# Patient Record
Sex: MALE | Race: WHITE | NOT HISPANIC OR LATINO | Employment: OTHER | URBAN - METROPOLITAN AREA
[De-identification: names, ages, dates, MRNs, and addresses within clinical notes are randomized per-mention and may not be internally consistent; named-entity substitution may affect disease eponyms.]

---

## 2020-08-04 ENCOUNTER — OFFICE VISIT (OUTPATIENT)
Dept: FAMILY MEDICINE CLINIC | Facility: CLINIC | Age: 55
End: 2020-08-04
Payer: COMMERCIAL

## 2020-08-04 VITALS
SYSTOLIC BLOOD PRESSURE: 130 MMHG | WEIGHT: 185 LBS | OXYGEN SATURATION: 97 % | BODY MASS INDEX: 25.9 KG/M2 | TEMPERATURE: 97.3 F | DIASTOLIC BLOOD PRESSURE: 82 MMHG | HEIGHT: 71 IN | HEART RATE: 86 BPM

## 2020-08-04 DIAGNOSIS — G56.01 CARPAL TUNNEL SYNDROME OF RIGHT WRIST: ICD-10-CM

## 2020-08-04 DIAGNOSIS — Z76.89 ENCOUNTER TO ESTABLISH CARE: Primary | ICD-10-CM

## 2020-08-04 DIAGNOSIS — Z12.11 COLON CANCER SCREENING: ICD-10-CM

## 2020-08-04 DIAGNOSIS — Z11.4 SCREENING FOR HIV (HUMAN IMMUNODEFICIENCY VIRUS): ICD-10-CM

## 2020-08-04 DIAGNOSIS — Z12.5 PROSTATE CANCER SCREENING: ICD-10-CM

## 2020-08-04 DIAGNOSIS — I10 ESSENTIAL HYPERTENSION: ICD-10-CM

## 2020-08-04 DIAGNOSIS — Z00.00 HEALTHCARE MAINTENANCE: ICD-10-CM

## 2020-08-04 PROCEDURE — 3725F SCREEN DEPRESSION PERFORMED: CPT | Performed by: NURSE PRACTITIONER

## 2020-08-04 PROCEDURE — 99203 OFFICE O/P NEW LOW 30 MIN: CPT | Performed by: NURSE PRACTITIONER

## 2020-08-04 PROCEDURE — 3075F SYST BP GE 130 - 139MM HG: CPT | Performed by: NURSE PRACTITIONER

## 2020-08-04 PROCEDURE — 3008F BODY MASS INDEX DOCD: CPT | Performed by: NURSE PRACTITIONER

## 2020-08-04 PROCEDURE — 3079F DIAST BP 80-89 MM HG: CPT | Performed by: NURSE PRACTITIONER

## 2020-08-04 RX ORDER — LISINOPRIL 20 MG/1
20 TABLET ORAL DAILY
Qty: 90 TABLET | Refills: 1 | Status: SHIPPED | OUTPATIENT
Start: 2020-08-04 | End: 2021-02-05 | Stop reason: SDUPTHER

## 2020-08-04 RX ORDER — DIPHENOXYLATE HYDROCHLORIDE AND ATROPINE SULFATE 2.5; .025 MG/1; MG/1
1 TABLET ORAL DAILY
COMMUNITY

## 2020-08-04 RX ORDER — ASPIRIN 81 MG/1
81 TABLET, CHEWABLE ORAL DAILY
COMMUNITY

## 2020-08-04 RX ORDER — LISINOPRIL 20 MG/1
20 TABLET ORAL DAILY
COMMUNITY
Start: 2020-06-10 | End: 2020-08-04 | Stop reason: SDUPTHER

## 2020-08-04 NOTE — PROGRESS NOTES
Assessment/Plan:     Chronic Problems:  Essential hypertension   Will obtain lab work  Will resume 20 mg lisinopril daily  Carpal tunnel syndrome of right wrist    Continue splints  Continue turmeric and Motrin as needed  Visit Diagnosis:  Diagnoses and all orders for this visit:    Encounter to establish care    Screening for HIV (human immunodeficiency virus)  Comments:    Patient amenable to HIV screening  Orders:  -     HIV 1/2 Antigen/Antibody (4th Generation) w Reflex [facility]; Future    Prostate cancer screening  -     PSA, Total Screen; Future    Colon cancer screening  -     Ambulatory referral to Gastroenterology; Future    Healthcare maintenance  -     CBC and differential; Future  -     Comprehensive metabolic panel; Future  -     Hemoglobin A1C; Future  -     Lipid panel; Future  -     TSH, 3rd generation; Future    Essential hypertension  -     Microalbumin / creatinine urine ratio  -     lisinopril (ZESTRIL) 20 mg tablet; Take 1 tablet (20 mg total) by mouth daily    Carpal tunnel syndrome of right wrist    Other orders  -     Discontinue: lisinopril (ZESTRIL) 20 mg tablet; Take 20 mg by mouth daily  -     MILK THISTLE PO; Take by mouth  -     multivitamin (THERAGRAN) TABS; Take 1 tablet by mouth daily  -     TURMERIC PO; Take by mouth  -     aspirin 81 mg chewable tablet; Chew 81 mg daily          Subjective:    Patient ID: Cleo Lo is a 49 y o  male  Patient presents to establish care  He was previously being seen at [facility] and then [facility]  He has not been seen for about a year  He has a son and a daughter-- 32 & 25  His daughter is in nursing school in takes his blood pressure frequently  Patient states that his blood pressure has been under good control recently  Past medical history of hypertension  Numbness in right hand every night, believes he has carpal tunnel  He does take turmeric, CBD and motrin with some relief  Pain wake him up every night  Currently unemployed, but worked in construction prior  Has been out of BP meds for over a week  Colonoscopy- never  Never had PSA  Due for blood work  The following portions of the patient's history were reviewed and updated as appropriate: allergies, current medications, past family history, past medical history, past social history, past surgical history and problem list     Review of Systems   Constitutional: Negative for chills, fatigue and fever  HENT: Negative  Respiratory: Negative for cough, chest tightness, shortness of breath and wheezing  Cardiovascular: Negative for chest pain and palpitations  Gastrointestinal: Negative for abdominal pain, constipation, diarrhea, nausea and vomiting  Genitourinary: Negative  Musculoskeletal: Positive for arthralgias (right arm) and joint swelling  Neurological: Negative for dizziness, light-headedness and headaches  Psychiatric/Behavioral: Negative for dysphoric mood and sleep disturbance  The patient is not nervous/anxious            /82   Pulse 86   Temp (!) 97 3 °F (36 3 °C)   Ht 5' 11"   Wt 83 9 kg (185 lb)   SpO2 97%   BMI 25 80 kg/m²   Social History     Socioeconomic History    Marital status: Single     Spouse name: Not on file    Number of children: Not on file    Years of education: Not on file    Highest education level: Not on file   Occupational History    Not on file   Social Needs    Financial resource strain: Not on file    Food insecurity     Worry: Not on file     Inability: Not on file    Transportation needs     Medical: Not on file     Non-medical: Not on file   Tobacco Use    Smoking status: Current Every Day Smoker     Packs/day: 0 50     Years: 5 00     Pack years: 2 50    Smokeless tobacco: Never Used   Substance and Sexual Activity    Alcohol use: Yes     Frequency: 4 or more times a week     Drinks per session: 1 or 2    Drug use: Not on file    Sexual activity: Not on file   Lifestyle    Physical activity     Days per week: Not on file     Minutes per session: Not on file    Stress: Not on file   Relationships    Social connections     Talks on phone: Not on file     Gets together: Not on file     Attends Christianity service: Not on file     Active member of club or organization: Not on file     Attends meetings of clubs or organizations: Not on file     Relationship status: Not on file    Intimate partner violence     Fear of current or ex partner: Not on file     Emotionally abused: Not on file     Physically abused: Not on file     Forced sexual activity: Not on file   Other Topics Concern    Not on file   Social History Narrative    Not on file     Past Medical History:   Diagnosis Date    Hypertension      Family History   Problem Relation Age of Onset    Lymphoma Mother     Skin cancer Father     Pancreatic cancer Maternal Grandmother      History reviewed  No pertinent surgical history  Current Outpatient Medications:     aspirin 81 mg chewable tablet, Chew 81 mg daily, Disp: , Rfl:     lisinopril (ZESTRIL) 20 mg tablet, Take 1 tablet (20 mg total) by mouth daily, Disp: 90 tablet, Rfl: 1    MILK THISTLE PO, Take by mouth, Disp: , Rfl:     multivitamin (THERAGRAN) TABS, Take 1 tablet by mouth daily, Disp: , Rfl:     TURMERIC PO, Take by mouth, Disp: , Rfl:     No Known Allergies       Lab Review   No results found for any previous visit  Imaging: No results found  Objective:     Physical Exam   Constitutional: He is oriented to person, place, and time  He appears well-developed  No distress  HENT:   Head: Normocephalic  Right Ear: External ear normal    Left Ear: External ear normal    Mouth/Throat: No oropharyngeal exudate  Eyes: Pupils are equal, round, and reactive to light  Conjunctivae are normal    Neck: Normal range of motion  Cardiovascular: Normal rate, regular rhythm and normal heart sounds  Exam reveals no gallop  No murmur heard    Pulmonary/Chest: Effort normal and breath sounds normal  No respiratory distress  He has no wheezes  Musculoskeletal: Normal range of motion  Right shoulder: He exhibits pain  He exhibits normal range of motion, no tenderness, no swelling and normal strength  Lymphadenopathy:     He has no cervical adenopathy  Neurological: He is alert and oriented to person, place, and time  Skin: Skin is warm and dry  There are no Patient Instructions on file for this visit  BEL Barrett    Portions of the record may have been created with voice recognition software  Occasional wrong word or "sound a like" substitutions may have occurred due to the inherent limitations of voice recognition software  Read the chart carefully and recognize, using context, where substitutions have occurred

## 2020-10-21 ENCOUNTER — LAB (OUTPATIENT)
Dept: LAB | Facility: HOSPITAL | Age: 55
End: 2020-10-21
Payer: COMMERCIAL

## 2020-10-21 DIAGNOSIS — Z11.4 SCREENING FOR HIV (HUMAN IMMUNODEFICIENCY VIRUS): ICD-10-CM

## 2020-10-21 DIAGNOSIS — Z00.00 HEALTHCARE MAINTENANCE: ICD-10-CM

## 2020-10-21 DIAGNOSIS — Z12.5 PROSTATE CANCER SCREENING: ICD-10-CM

## 2020-10-21 DIAGNOSIS — R74.8 ELEVATED LIVER ENZYMES: Primary | ICD-10-CM

## 2020-10-21 LAB
ALBUMIN SERPL BCP-MCNC: 4 G/DL (ref 3.5–5)
ALP SERPL-CCNC: 118 U/L (ref 46–116)
ALT SERPL W P-5'-P-CCNC: 118 U/L (ref 12–78)
ANION GAP SERPL CALCULATED.3IONS-SCNC: 11 MMOL/L (ref 4–13)
AST SERPL W P-5'-P-CCNC: 149 U/L (ref 5–45)
BASOPHILS # BLD AUTO: 0.05 THOUSANDS/ΜL (ref 0–0.1)
BASOPHILS NFR BLD AUTO: 1 % (ref 0–1)
BILIRUB SERPL-MCNC: 1 MG/DL (ref 0.2–1)
BUN SERPL-MCNC: 13 MG/DL (ref 5–25)
CALCIUM SERPL-MCNC: 9.5 MG/DL (ref 8.3–10.1)
CHLORIDE SERPL-SCNC: 99 MMOL/L (ref 100–108)
CHOLEST SERPL-MCNC: 178 MG/DL (ref 50–200)
CO2 SERPL-SCNC: 30 MMOL/L (ref 21–32)
CREAT SERPL-MCNC: 0.92 MG/DL (ref 0.6–1.3)
CREAT UR-MCNC: 296 MG/DL
EOSINOPHIL # BLD AUTO: 0.11 THOUSAND/ΜL (ref 0–0.61)
EOSINOPHIL NFR BLD AUTO: 2 % (ref 0–6)
ERYTHROCYTE [DISTWIDTH] IN BLOOD BY AUTOMATED COUNT: 14.9 % (ref 11.6–15.1)
EST. AVERAGE GLUCOSE BLD GHB EST-MCNC: 100 MG/DL
GFR SERPL CREATININE-BSD FRML MDRD: 95 ML/MIN/1.73SQ M
GLUCOSE P FAST SERPL-MCNC: 88 MG/DL (ref 65–99)
HBA1C MFR BLD: 5.1 %
HCT VFR BLD AUTO: 42.5 % (ref 36.5–49.3)
HDLC SERPL-MCNC: 67 MG/DL
HGB BLD-MCNC: 13.8 G/DL (ref 12–17)
IMM GRANULOCYTES # BLD AUTO: 0.02 THOUSAND/UL (ref 0–0.2)
IMM GRANULOCYTES NFR BLD AUTO: 0 % (ref 0–2)
LDLC SERPL CALC-MCNC: 101 MG/DL (ref 0–100)
LYMPHOCYTES # BLD AUTO: 2.59 THOUSANDS/ΜL (ref 0.6–4.47)
LYMPHOCYTES NFR BLD AUTO: 42 % (ref 14–44)
MCH RBC QN AUTO: 30.4 PG (ref 26.8–34.3)
MCHC RBC AUTO-ENTMCNC: 32.5 G/DL (ref 31.4–37.4)
MCV RBC AUTO: 94 FL (ref 82–98)
MICROALBUMIN UR-MCNC: 36.3 MG/L (ref 0–20)
MICROALBUMIN/CREAT 24H UR: 12 MG/G CREATININE (ref 0–30)
MONOCYTES # BLD AUTO: 0.72 THOUSAND/ΜL (ref 0.17–1.22)
MONOCYTES NFR BLD AUTO: 12 % (ref 4–12)
NEUTROPHILS # BLD AUTO: 2.65 THOUSANDS/ΜL (ref 1.85–7.62)
NEUTS SEG NFR BLD AUTO: 43 % (ref 43–75)
NONHDLC SERPL-MCNC: 111 MG/DL
NRBC BLD AUTO-RTO: 0 /100 WBCS
PLATELET # BLD AUTO: 109 THOUSANDS/UL (ref 149–390)
PMV BLD AUTO: 10.4 FL (ref 8.9–12.7)
POTASSIUM SERPL-SCNC: 3.8 MMOL/L (ref 3.5–5.3)
PROT SERPL-MCNC: 8.1 G/DL (ref 6.4–8.2)
PSA SERPL-MCNC: 0.6 NG/ML (ref 0–4)
RBC # BLD AUTO: 4.54 MILLION/UL (ref 3.88–5.62)
SODIUM SERPL-SCNC: 140 MMOL/L (ref 136–145)
TRIGL SERPL-MCNC: 48 MG/DL
TSH SERPL DL<=0.05 MIU/L-ACNC: 2.61 UIU/ML (ref 0.36–3.74)
WBC # BLD AUTO: 6.14 THOUSAND/UL (ref 4.31–10.16)

## 2020-10-21 PROCEDURE — 84443 ASSAY THYROID STIM HORMONE: CPT

## 2020-10-21 PROCEDURE — 82570 ASSAY OF URINE CREATININE: CPT | Performed by: NURSE PRACTITIONER

## 2020-10-21 PROCEDURE — 87389 HIV-1 AG W/HIV-1&-2 AB AG IA: CPT

## 2020-10-21 PROCEDURE — G0103 PSA SCREENING: HCPCS

## 2020-10-21 PROCEDURE — 80061 LIPID PANEL: CPT

## 2020-10-21 PROCEDURE — 83036 HEMOGLOBIN GLYCOSYLATED A1C: CPT

## 2020-10-21 PROCEDURE — 36415 COLL VENOUS BLD VENIPUNCTURE: CPT

## 2020-10-21 PROCEDURE — 82043 UR ALBUMIN QUANTITATIVE: CPT | Performed by: NURSE PRACTITIONER

## 2020-10-21 PROCEDURE — 80053 COMPREHEN METABOLIC PANEL: CPT

## 2020-10-21 PROCEDURE — 85025 COMPLETE CBC W/AUTO DIFF WBC: CPT

## 2020-10-22 ENCOUNTER — TELEPHONE (OUTPATIENT)
Dept: FAMILY MEDICINE CLINIC | Facility: CLINIC | Age: 55
End: 2020-10-22

## 2020-10-22 LAB — HIV 1+2 AB+HIV1 P24 AG SERPL QL IA: NORMAL

## 2020-10-26 ENCOUNTER — TELEPHONE (OUTPATIENT)
Dept: GASTROENTEROLOGY | Facility: CLINIC | Age: 55
End: 2020-10-26

## 2020-10-27 ENCOUNTER — LAB (OUTPATIENT)
Dept: LAB | Facility: HOSPITAL | Age: 55
End: 2020-10-27
Attending: INTERNAL MEDICINE
Payer: COMMERCIAL

## 2020-10-27 ENCOUNTER — OFFICE VISIT (OUTPATIENT)
Dept: GASTROENTEROLOGY | Facility: CLINIC | Age: 55
End: 2020-10-27
Payer: COMMERCIAL

## 2020-10-27 VITALS
SYSTOLIC BLOOD PRESSURE: 118 MMHG | DIASTOLIC BLOOD PRESSURE: 72 MMHG | BODY MASS INDEX: 25.16 KG/M2 | HEART RATE: 85 BPM | WEIGHT: 180.38 LBS | TEMPERATURE: 96.2 F

## 2020-10-27 DIAGNOSIS — R74.8 ELEVATED LIVER ENZYMES: ICD-10-CM

## 2020-10-27 PROBLEM — Z12.11 COLON CANCER SCREENING: Status: ACTIVE | Noted: 2020-10-27

## 2020-10-27 LAB
ALBUMIN SERPL BCP-MCNC: 3.6 G/DL (ref 3.5–5)
ALP SERPL-CCNC: 93 U/L (ref 46–116)
ALT SERPL W P-5'-P-CCNC: 78 U/L (ref 12–78)
ANION GAP SERPL CALCULATED.3IONS-SCNC: 9 MMOL/L (ref 4–13)
AST SERPL W P-5'-P-CCNC: 63 U/L (ref 5–45)
BILIRUB SERPL-MCNC: 0.5 MG/DL (ref 0.2–1)
BUN SERPL-MCNC: 11 MG/DL (ref 5–25)
CALCIUM SERPL-MCNC: 9.2 MG/DL (ref 8.3–10.1)
CHLORIDE SERPL-SCNC: 102 MMOL/L (ref 100–108)
CO2 SERPL-SCNC: 27 MMOL/L (ref 21–32)
CREAT SERPL-MCNC: 0.85 MG/DL (ref 0.6–1.3)
FERRITIN SERPL-MCNC: 358 NG/ML (ref 8–388)
GFR SERPL CREATININE-BSD FRML MDRD: 99 ML/MIN/1.73SQ M
GLUCOSE SERPL-MCNC: 85 MG/DL (ref 65–140)
HBV CORE AB SER QL: NORMAL
HBV CORE IGM SER QL: NORMAL
HBV SURFACE AG SER QL: NORMAL
HCV AB SER QL: NORMAL
IRON SATN MFR SERPL: 20 %
IRON SERPL-MCNC: 64 UG/DL (ref 65–175)
POTASSIUM SERPL-SCNC: 3.7 MMOL/L (ref 3.5–5.3)
PROT SERPL-MCNC: 7.6 G/DL (ref 6.4–8.2)
SODIUM SERPL-SCNC: 138 MMOL/L (ref 136–145)
TIBC SERPL-MCNC: 314 UG/DL (ref 250–450)
TSH SERPL DL<=0.05 MIU/L-ACNC: 3.21 UIU/ML (ref 0.36–3.74)

## 2020-10-27 PROCEDURE — 36415 COLL VENOUS BLD VENIPUNCTURE: CPT

## 2020-10-27 PROCEDURE — 86235 NUCLEAR ANTIGEN ANTIBODY: CPT

## 2020-10-27 PROCEDURE — 86256 FLUORESCENT ANTIBODY TITER: CPT

## 2020-10-27 PROCEDURE — 80053 COMPREHEN METABOLIC PANEL: CPT

## 2020-10-27 PROCEDURE — 86704 HEP B CORE ANTIBODY TOTAL: CPT

## 2020-10-27 PROCEDURE — 3078F DIAST BP <80 MM HG: CPT | Performed by: INTERNAL MEDICINE

## 2020-10-27 PROCEDURE — 86803 HEPATITIS C AB TEST: CPT

## 2020-10-27 PROCEDURE — 82728 ASSAY OF FERRITIN: CPT

## 2020-10-27 PROCEDURE — 86038 ANTINUCLEAR ANTIBODIES: CPT

## 2020-10-27 PROCEDURE — 3074F SYST BP LT 130 MM HG: CPT | Performed by: INTERNAL MEDICINE

## 2020-10-27 PROCEDURE — 83540 ASSAY OF IRON: CPT

## 2020-10-27 PROCEDURE — 87340 HEPATITIS B SURFACE AG IA: CPT

## 2020-10-27 PROCEDURE — 86705 HEP B CORE ANTIBODY IGM: CPT

## 2020-10-27 PROCEDURE — 84443 ASSAY THYROID STIM HORMONE: CPT

## 2020-10-27 PROCEDURE — 83550 IRON BINDING TEST: CPT

## 2020-10-27 PROCEDURE — 99244 OFF/OP CNSLTJ NEW/EST MOD 40: CPT | Performed by: INTERNAL MEDICINE

## 2020-10-28 LAB
ACTIN IGG SERPL-ACNC: 8 UNITS (ref 0–19)
MITOCHONDRIA M2 IGG SER-ACNC: <20 UNITS (ref 0–20)
RYE IGE QN: NEGATIVE

## 2020-11-15 ENCOUNTER — HOSPITAL ENCOUNTER (OUTPATIENT)
Dept: ULTRASOUND IMAGING | Facility: HOSPITAL | Age: 55
Discharge: HOME | End: 2020-11-15
Attending: INTERNAL MEDICINE
Payer: COMMERCIAL

## 2020-11-15 DIAGNOSIS — R74.8 ELEVATED LIVER ENZYMES: ICD-10-CM

## 2020-11-15 PROCEDURE — 76700 US EXAM ABDOM COMPLETE: CPT

## 2020-11-17 ENCOUNTER — ANESTHESIA EVENT (OUTPATIENT)
Dept: GASTROENTEROLOGY | Facility: HOSPITAL | Age: 55
End: 2020-11-17

## 2020-11-18 ENCOUNTER — HOSPITAL ENCOUNTER (OUTPATIENT)
Dept: GASTROENTEROLOGY | Facility: HOSPITAL | Age: 55
Setting detail: OUTPATIENT SURGERY
Discharge: HOME | End: 2020-11-18
Attending: INTERNAL MEDICINE
Payer: COMMERCIAL

## 2020-11-18 ENCOUNTER — TELEPHONE (OUTPATIENT)
Dept: GASTROENTEROLOGY | Facility: CLINIC | Age: 55
End: 2020-11-18

## 2020-11-18 ENCOUNTER — ANESTHESIA (OUTPATIENT)
Dept: GASTROENTEROLOGY | Facility: HOSPITAL | Age: 55
End: 2020-11-18

## 2020-11-18 VITALS
WEIGHT: 173.5 LBS | HEART RATE: 68 BPM | RESPIRATION RATE: 18 BRPM | TEMPERATURE: 98.8 F | SYSTOLIC BLOOD PRESSURE: 109 MMHG | HEIGHT: 72 IN | DIASTOLIC BLOOD PRESSURE: 70 MMHG | BODY MASS INDEX: 23.5 KG/M2 | OXYGEN SATURATION: 97 %

## 2020-11-18 VITALS — HEART RATE: 63 BPM

## 2020-11-18 DIAGNOSIS — R74.8 ELEVATED LIVER ENZYMES: ICD-10-CM

## 2020-11-18 PROCEDURE — 45378 DIAGNOSTIC COLONOSCOPY: CPT | Performed by: INTERNAL MEDICINE

## 2020-11-18 RX ORDER — THIAMINE MONONITRATE (VIT B1) 100 MG
100 TABLET ORAL DAILY
COMMUNITY

## 2020-11-18 RX ORDER — LIDOCAINE HYDROCHLORIDE 10 MG/ML
0.5 INJECTION, SOLUTION EPIDURAL; INFILTRATION; INTRACAUDAL; PERINEURAL ONCE AS NEEDED
Status: DISCONTINUED | OUTPATIENT
Start: 2020-11-18 | End: 2020-11-22 | Stop reason: HOSPADM

## 2020-11-18 RX ORDER — PROPOFOL 10 MG/ML
INJECTION, EMULSION INTRAVENOUS AS NEEDED
Status: DISCONTINUED | OUTPATIENT
Start: 2020-11-18 | End: 2020-11-18

## 2020-11-18 RX ORDER — SODIUM CHLORIDE, SODIUM LACTATE, POTASSIUM CHLORIDE, CALCIUM CHLORIDE 600; 310; 30; 20 MG/100ML; MG/100ML; MG/100ML; MG/100ML
INJECTION, SOLUTION INTRAVENOUS CONTINUOUS PRN
Status: DISCONTINUED | OUTPATIENT
Start: 2020-11-18 | End: 2020-11-18

## 2020-11-18 RX ORDER — SODIUM CHLORIDE, SODIUM LACTATE, POTASSIUM CHLORIDE, CALCIUM CHLORIDE 600; 310; 30; 20 MG/100ML; MG/100ML; MG/100ML; MG/100ML
125 INJECTION, SOLUTION INTRAVENOUS CONTINUOUS
Status: DISCONTINUED | OUTPATIENT
Start: 2020-11-18 | End: 2020-11-22 | Stop reason: HOSPADM

## 2020-11-18 RX ORDER — LIDOCAINE HYDROCHLORIDE 10 MG/ML
INJECTION, SOLUTION EPIDURAL; INFILTRATION; INTRACAUDAL; PERINEURAL AS NEEDED
Status: DISCONTINUED | OUTPATIENT
Start: 2020-11-18 | End: 2020-11-18

## 2020-11-18 RX ADMIN — SODIUM CHLORIDE, SODIUM LACTATE, POTASSIUM CHLORIDE, AND CALCIUM CHLORIDE 125 ML/HR: .6; .31; .03; .02 INJECTION, SOLUTION INTRAVENOUS at 08:46

## 2020-11-18 RX ADMIN — SODIUM CHLORIDE, SODIUM LACTATE, POTASSIUM CHLORIDE, AND CALCIUM CHLORIDE: .6; .31; .03; .02 INJECTION, SOLUTION INTRAVENOUS at 09:14

## 2020-11-18 RX ADMIN — PROPOFOL 130 MG: 10 INJECTION, EMULSION INTRAVENOUS at 09:16

## 2020-11-18 RX ADMIN — LIDOCAINE HYDROCHLORIDE 50 MG: 10 INJECTION, SOLUTION EPIDURAL; INFILTRATION; INTRACAUDAL; PERINEURAL at 09:16

## 2020-11-18 RX ADMIN — PROPOFOL 30 MG: 10 INJECTION, EMULSION INTRAVENOUS at 09:18

## 2021-02-05 ENCOUNTER — OFFICE VISIT (OUTPATIENT)
Dept: FAMILY MEDICINE CLINIC | Facility: CLINIC | Age: 56
End: 2021-02-05
Payer: COMMERCIAL

## 2021-02-05 VITALS
DIASTOLIC BLOOD PRESSURE: 78 MMHG | SYSTOLIC BLOOD PRESSURE: 112 MMHG | BODY MASS INDEX: 26.95 KG/M2 | WEIGHT: 199 LBS | HEART RATE: 84 BPM | TEMPERATURE: 97.6 F | OXYGEN SATURATION: 100 % | HEIGHT: 72 IN

## 2021-02-05 DIAGNOSIS — Z00.00 HEALTHCARE MAINTENANCE: ICD-10-CM

## 2021-02-05 DIAGNOSIS — I10 ESSENTIAL HYPERTENSION: Primary | ICD-10-CM

## 2021-02-05 DIAGNOSIS — G56.01 CARPAL TUNNEL SYNDROME OF RIGHT WRIST: ICD-10-CM

## 2021-02-05 PROBLEM — R74.8 ELEVATED LIVER ENZYMES: Status: RESOLVED | Noted: 2020-10-27 | Resolved: 2021-02-05

## 2021-02-05 PROBLEM — Z12.11 COLON CANCER SCREENING: Status: RESOLVED | Noted: 2020-10-27 | Resolved: 2021-02-05

## 2021-02-05 PROCEDURE — 99214 OFFICE O/P EST MOD 30 MIN: CPT | Performed by: NURSE PRACTITIONER

## 2021-02-05 RX ORDER — LISINOPRIL 10 MG/1
10 TABLET ORAL DAILY
Qty: 30 TABLET | Refills: 0 | Status: SHIPPED | OUTPATIENT
Start: 2021-02-05 | End: 2021-02-25 | Stop reason: SDUPTHER

## 2021-02-05 NOTE — PROGRESS NOTES
BMI Counseling: Body mass index is 26 99 kg/m²  The BMI is above normal  Nutrition recommendations include decreasing portion sizes, encouraging healthy choices of fruits and vegetables, decreasing fast food intake, consuming healthier snacks, limiting drinks that contain sugar, moderation in carbohydrate intake, increasing intake of lean protein, reducing intake of saturated and trans fat and reducing intake of cholesterol  Exercise recommendations include moderate physical activity 150 minutes/week, vigorous physical activity 75 minutes/week and exercising 3-5 times per week  No pharmacotherapy was ordered  Assessment/Plan:     Chronic Problems:  Essential hypertension    Will decrease lisinopril to 10 mg daily  Will bring back in 1 month to monitor blood pressure  If blood pressure is within goal, will stop lisinopril  Carpal tunnel syndrome of right wrist  Continue splints  Visit Diagnosis:  Diagnoses and all orders for this visit:    Essential hypertension  -     lisinopril (ZESTRIL) 10 mg tablet; Take 1 tablet (10 mg total) by mouth daily    Healthcare maintenance  -     CBC and differential; Future  -     Comprehensive metabolic panel; Future  -     Lipid panel; Future    Carpal tunnel syndrome of right wrist          Subjective:    Patient ID: Mateo Inman is a 48 y o  male  Patient presents for routine follow-up  Patient states that his blood pressure has been very well controlled at home  He would like to try to come off of his lisinopril  He did have a colonoscopy done in November of 2020  His liver enzymes did improve as he did stop drinking alcohol  He does continue to smoke about half a pack a day        The following portions of the patient's history were reviewed and updated as appropriate: allergies, current medications, past family history, past medical history, past social history, past surgical history and problem list     Review of Systems   Constitutional: Negative for chills and fever  HENT: Negative for ear pain and sore throat  Eyes: Negative for pain and visual disturbance  Respiratory: Negative for cough and shortness of breath  Cardiovascular: Negative for chest pain and palpitations  Gastrointestinal: Negative for abdominal pain and vomiting  Genitourinary: Negative for dysuria and hematuria  Musculoskeletal: Positive for arthralgias (hands)  Negative for back pain  Skin: Negative for color change and rash  Neurological: Negative for seizures and syncope  All other systems reviewed and are negative          /78   Pulse 84   Temp 97 6 °F (36 4 °C)   Ht 6' (1 829 m)   Wt 90 3 kg (199 lb)   SpO2 100%   BMI 26 99 kg/m²   Social History     Socioeconomic History    Marital status: Single     Spouse name: Not on file    Number of children: Not on file    Years of education: Not on file    Highest education level: Not on file   Occupational History    Not on file   Social Needs    Financial resource strain: Not on file    Food insecurity     Worry: Not on file     Inability: Not on file    Transportation needs     Medical: Not on file     Non-medical: Not on file   Tobacco Use    Smoking status: Current Every Day Smoker     Packs/day: 0 50     Years: 5 00     Pack years: 2 50    Smokeless tobacco: Never Used   Substance and Sexual Activity    Alcohol use: Not Currently     Frequency: 4 or more times a week     Drinks per session: 1 or 2    Drug use: Not on file    Sexual activity: Not on file   Lifestyle    Physical activity     Days per week: Not on file     Minutes per session: Not on file    Stress: Not on file   Relationships    Social connections     Talks on phone: Not on file     Gets together: Not on file     Attends Confucianism service: Not on file     Active member of club or organization: Not on file     Attends meetings of clubs or organizations: Not on file     Relationship status: Not on file    Intimate partner violence     Fear of current or ex partner: Not on file     Emotionally abused: Not on file     Physically abused: Not on file     Forced sexual activity: Not on file   Other Topics Concern    Not on file   Social History Narrative    Not on file     Past Medical History:   Diagnosis Date    Hypertension     Pneumonia      Family History   Problem Relation Age of Onset    Lymphoma Mother     Skin cancer Father     Pancreatic cancer Maternal Grandmother      Past Surgical History:   Procedure Laterality Date    FINGER SURGERY Left     index    KNEE ARTHROSCOPY Left     LUNG SURGERY Left        Current Outpatient Medications:     aspirin 81 mg chewable tablet, Chew 81 mg daily, Disp: , Rfl:     lisinopril (ZESTRIL) 10 mg tablet, Take 1 tablet (10 mg total) by mouth daily, Disp: 30 tablet, Rfl: 0    MILK THISTLE PO, Take by mouth, Disp: , Rfl:     multivitamin (THERAGRAN) TABS, Take 1 tablet by mouth daily, Disp: , Rfl:     NON FORMULARY, CBD 500mg daily, Disp: , Rfl:     thiamine (VITAMIN B1) 100 mg tablet, Take 100 mg by mouth daily, Disp: , Rfl:     TURMERIC PO, Take by mouth, Disp: , Rfl:     No Known Allergies       Lab Review   No visits with results within 2 Month(s) from this visit     Latest known visit with results is:   Lab on 10/27/2020   Component Date Value    GEOFF 10/27/2020 Negative     Mitochondrial Ab 10/27/2020 <20 0     Smooth Muscle Ab 10/27/2020 8     Hepatitis B Surface Ag 10/27/2020 Non-reactive     Hepatitis C Ab 10/27/2020 Non-reactive     Hep B C IgM 10/27/2020 Non-reactive     Hep B Core Total Ab 10/27/2020 Non-reactive     Sodium 10/27/2020 138     Potassium 10/27/2020 3 7     Chloride 10/27/2020 102     CO2 10/27/2020 27     ANION GAP 10/27/2020 9     BUN 10/27/2020 11     Creatinine 10/27/2020 0 85     Glucose 10/27/2020 85     Calcium 10/27/2020 9 2     AST 10/27/2020 63*    ALT 10/27/2020 78     Alkaline Phosphatase 10/27/2020 93     Total Protein 10/27/2020 7 6     Albumin 10/27/2020 3 6     Total Bilirubin 10/27/2020 0 50     eGFR 10/27/2020 99     TSH 3RD GENERATON 10/27/2020 3  214     Iron Saturation 10/27/2020 20     TIBC 10/27/2020 314     Iron 10/27/2020 64*    Ferritin 10/27/2020 358         Imaging: No results found  Objective:     Physical Exam  Constitutional:       General: He is not in acute distress  Appearance: He is well-developed  Cardiovascular:      Rate and Rhythm: Normal rate and regular rhythm  Heart sounds: Normal heart sounds  No murmur  No gallop  Pulmonary:      Effort: Pulmonary effort is normal  No respiratory distress  Breath sounds: Normal breath sounds  No wheezing  Musculoskeletal: Normal range of motion  Skin:     General: Skin is warm and dry  Neurological:      Mental Status: He is alert and oriented to person, place, and time  There are no Patient Instructions on file for this visit  BEL Barrett    Portions of the record may have been created with voice recognition software  Occasional wrong word or "sound a like" substitutions may have occurred due to the inherent limitations of voice recognition software  Read the chart carefully and recognize, using context, where substitutions have occurred

## 2021-02-05 NOTE — ASSESSMENT & PLAN NOTE
Will decrease lisinopril to 10 mg daily  Will bring back in 1 month to monitor blood pressure  If blood pressure is within goal, will stop lisinopril

## 2021-02-25 DIAGNOSIS — I10 ESSENTIAL HYPERTENSION: ICD-10-CM

## 2021-02-26 RX ORDER — LISINOPRIL 10 MG/1
10 TABLET ORAL DAILY
Qty: 90 TABLET | Refills: 1 | Status: SHIPPED | OUTPATIENT
Start: 2021-02-26 | End: 2021-03-12

## 2021-03-12 ENCOUNTER — OFFICE VISIT (OUTPATIENT)
Dept: FAMILY MEDICINE CLINIC | Facility: CLINIC | Age: 56
End: 2021-03-12
Payer: COMMERCIAL

## 2021-03-12 VITALS
HEART RATE: 85 BPM | WEIGHT: 197 LBS | DIASTOLIC BLOOD PRESSURE: 88 MMHG | BODY MASS INDEX: 26.68 KG/M2 | OXYGEN SATURATION: 100 % | HEIGHT: 72 IN | SYSTOLIC BLOOD PRESSURE: 130 MMHG

## 2021-03-12 DIAGNOSIS — I10 ESSENTIAL HYPERTENSION: ICD-10-CM

## 2021-03-12 DIAGNOSIS — G56.01 CARPAL TUNNEL SYNDROME OF RIGHT WRIST: Primary | ICD-10-CM

## 2021-03-12 PROCEDURE — 99214 OFFICE O/P EST MOD 30 MIN: CPT | Performed by: NURSE PRACTITIONER

## 2021-03-12 RX ORDER — LISINOPRIL 5 MG/1
5 TABLET ORAL DAILY
Start: 2021-03-12 | End: 2021-09-13 | Stop reason: DRUGHIGH

## 2021-03-12 NOTE — PROGRESS NOTES
Assessment/Plan:     Chronic Problems:  Essential hypertension   Will continue to titrate down lisinopril  Advised patient to take 5 mg daily  Will follow-up in 1 month  If blood pressure remains greater than 130/90, will go back to 10 mg daily  Visit Diagnosis:  Diagnoses and all orders for this visit:    Carpal tunnel syndrome of right wrist  -     Ambulatory referral to Hand Surgery; Future    Essential hypertension  -     lisinopril (ZESTRIL) 5 mg tablet; Take 1 tablet (5 mg total) by mouth daily          Subjective:    Patient ID: Syl Díaz is a 48 y o  male  Patient presents for follow-up on blood pressure  Patient states that blood pressure has been around 128/80 at home  He is trying to wean off of his lisinopril  He would like to see hand surgeon for carpal tunnel  The following portions of the patient's history were reviewed and updated as appropriate: allergies, current medications, past family history, past medical history, past social history, past surgical history and problem list     Review of Systems   Constitutional: Negative for chills and fever  HENT: Negative for ear pain and sore throat  Eyes: Negative for pain and visual disturbance  Respiratory: Negative for cough and shortness of breath  Cardiovascular: Negative for chest pain and palpitations  Gastrointestinal: Negative for abdominal pain and vomiting  Genitourinary: Negative for dysuria and hematuria  Musculoskeletal: Negative for arthralgias and back pain  Skin: Negative for color change and rash  Neurological: Negative for seizures and syncope  All other systems reviewed and are negative          /88   Pulse 85   Ht 6' (1 829 m)   Wt 89 4 kg (197 lb)   SpO2 100%   BMI 26 72 kg/m²   Social History     Socioeconomic History    Marital status: Single     Spouse name: Not on file    Number of children: Not on file    Years of education: Not on file    Highest education level: Not on file   Occupational History    Not on file   Social Needs    Financial resource strain: Not on file    Food insecurity     Worry: Not on file     Inability: Not on file    Transportation needs     Medical: Not on file     Non-medical: Not on file   Tobacco Use    Smoking status: Current Every Day Smoker     Packs/day: 0 50     Years: 5 00     Pack years: 2 50    Smokeless tobacco: Never Used   Substance and Sexual Activity    Alcohol use: Not Currently     Frequency: 4 or more times a week     Drinks per session: 1 or 2    Drug use: Not on file    Sexual activity: Not on file   Lifestyle    Physical activity     Days per week: Not on file     Minutes per session: Not on file    Stress: Not on file   Relationships    Social connections     Talks on phone: Not on file     Gets together: Not on file     Attends Yazidism service: Not on file     Active member of club or organization: Not on file     Attends meetings of clubs or organizations: Not on file     Relationship status: Not on file    Intimate partner violence     Fear of current or ex partner: Not on file     Emotionally abused: Not on file     Physically abused: Not on file     Forced sexual activity: Not on file   Other Topics Concern    Not on file   Social History Narrative    Not on file     Past Medical History:   Diagnosis Date    Hypertension     Pneumonia      Family History   Problem Relation Age of Onset    Lymphoma Mother     Skin cancer Father     Pancreatic cancer Maternal Grandmother      Past Surgical History:   Procedure Laterality Date    FINGER SURGERY Left     index    KNEE ARTHROSCOPY Left     LUNG SURGERY Left        Current Outpatient Medications:     aspirin 81 mg chewable tablet, Chew 81 mg daily, Disp: , Rfl:     lisinopril (ZESTRIL) 5 mg tablet, Take 1 tablet (5 mg total) by mouth daily, Disp: , Rfl:     MILK THISTLE PO, Take by mouth, Disp: , Rfl:     multivitamin (THERAGRAN) TABS, Take 1 tablet by mouth daily, Disp: , Rfl:     NON FORMULARY, CBD 500mg daily, Disp: , Rfl:     thiamine (VITAMIN B1) 100 mg tablet, Take 100 mg by mouth daily, Disp: , Rfl:     TURMERIC PO, Take by mouth, Disp: , Rfl:     No Known Allergies       Lab Review   No visits with results within 2 Month(s) from this visit  Latest known visit with results is:   Lab on 10/27/2020   Component Date Value    GEOFF 10/27/2020 Negative     Mitochondrial Ab 10/27/2020 <20 0     Smooth Muscle Ab 10/27/2020 8     Hepatitis B Surface Ag 10/27/2020 Non-reactive     Hepatitis C Ab 10/27/2020 Non-reactive     Hep B C IgM 10/27/2020 Non-reactive     Hep B Core Total Ab 10/27/2020 Non-reactive     Sodium 10/27/2020 138     Potassium 10/27/2020 3 7     Chloride 10/27/2020 102     CO2 10/27/2020 27     ANION GAP 10/27/2020 9     BUN 10/27/2020 11     Creatinine 10/27/2020 0 85     Glucose 10/27/2020 85     Calcium 10/27/2020 9 2     AST 10/27/2020 63*    ALT 10/27/2020 78     Alkaline Phosphatase 10/27/2020 93     Total Protein 10/27/2020 7 6     Albumin 10/27/2020 3 6     Total Bilirubin 10/27/2020 0 50     eGFR 10/27/2020 99     TSH 3RD GENERATON 10/27/2020 3  214     Iron Saturation 10/27/2020 20     TIBC 10/27/2020 314     Iron 10/27/2020 64*    Ferritin 10/27/2020 358         Imaging: No results found  Objective:     Physical Exam  Constitutional:       General: He is not in acute distress  Appearance: He is well-developed  Cardiovascular:      Rate and Rhythm: Normal rate and regular rhythm  Heart sounds: Normal heart sounds  No murmur  No gallop  Pulmonary:      Effort: Pulmonary effort is normal  No respiratory distress  Breath sounds: Normal breath sounds  No wheezing  Musculoskeletal: Normal range of motion  Skin:     General: Skin is warm and dry  Neurological:      Mental Status: He is alert and oriented to person, place, and time             There are no Patient Instructions on file for this visit  BEL Barrett    Portions of the record may have been created with voice recognition software  Occasional wrong word or "sound a like" substitutions may have occurred due to the inherent limitations of voice recognition software  Read the chart carefully and recognize, using context, where substitutions have occurred

## 2021-03-12 NOTE — ASSESSMENT & PLAN NOTE
Will continue to titrate down lisinopril  Advised patient to take 5 mg daily  Will follow-up in 1 month  If blood pressure remains greater than 130/90, will go back to 10 mg daily

## 2021-03-25 ENCOUNTER — OFFICE VISIT (OUTPATIENT)
Dept: OBGYN CLINIC | Facility: CLINIC | Age: 56
End: 2021-03-25
Payer: COMMERCIAL

## 2021-03-25 VITALS
SYSTOLIC BLOOD PRESSURE: 149 MMHG | BODY MASS INDEX: 26.68 KG/M2 | HEIGHT: 72 IN | WEIGHT: 197 LBS | HEART RATE: 77 BPM | DIASTOLIC BLOOD PRESSURE: 82 MMHG

## 2021-03-25 DIAGNOSIS — G56.01 CARPAL TUNNEL SYNDROME OF RIGHT WRIST: Primary | ICD-10-CM

## 2021-03-25 PROCEDURE — 99243 OFF/OP CNSLTJ NEW/EST LOW 30: CPT | Performed by: ORTHOPAEDIC SURGERY

## 2021-03-25 PROCEDURE — 3079F DIAST BP 80-89 MM HG: CPT | Performed by: ORTHOPAEDIC SURGERY

## 2021-03-25 PROCEDURE — 3077F SYST BP >= 140 MM HG: CPT | Performed by: ORTHOPAEDIC SURGERY

## 2021-03-25 RX ORDER — LISINOPRIL 10 MG/1
10 TABLET ORAL DAILY
COMMUNITY
Start: 2021-03-16 | End: 2021-09-13

## 2021-03-25 NOTE — LETTER
March 26, 2021     Marquis Marshall, [address] [place] [address]    Patient: Jo Ann Palmer   YOB: 1965   Date of Visit: 3/25/2021       Dear Dr Key Mckeon:    Thank you for referring Sandi Holman to me for evaluation  Below are my notes for this consultation  If you have questions, please do not hesitate to call me  I look forward to following your patient along with you  Sincerely,        Cyndy Frey MD        CC: No Recipients  Cyndy Frey MD  3/25/2021  2:05 PM  Signed  CHIEF COMPLAINT:  Chief Complaint   Patient presents with    Right Wrist - Pain       SUBJECTIVE:  Jo Ann Palmer is a 48y o  year old  male who presents right hand numbness and tingling  Numbness and tingling affectsindex finger, long finger, ring finger Patient states they DO have night time symptoms  They have used braces at night  Patient states the symptoms are constant  The symptoms have been going on for 1 year but worsening   PT denies previous care         PAST MEDICAL HISTORY:  Past Medical History:   Diagnosis Date    Hypertension     Pneumonia        PAST SURGICAL HISTORY:  Past Surgical History:   Procedure Laterality Date    FINGER SURGERY Left     index    KNEE ARTHROSCOPY Left     LUNG SURGERY Left        FAMILY HISTORY:  Family History   Problem Relation Age of Onset    Lymphoma Mother     Skin cancer Father     Pancreatic cancer Maternal Grandmother        SOCIAL HISTORY:  Social History     Tobacco Use    Smoking status: Current Every Day Smoker     Packs/day: 0 50     Years: 5 00     Pack years: 2 50    Smokeless tobacco: Never Used   Substance Use Topics    Alcohol use: Not Currently     Frequency: 4 or more times a week     Drinks per session: 1 or 2    Drug use: Not on file       MEDICATIONS:    Current Outpatient Medications:     aspirin 81 mg chewable tablet, Chew 81 mg daily, Disp: , Rfl:     lisinopril (ZESTRIL) 10 mg tablet, Take 10 mg by mouth daily, Disp: , Rfl:   lisinopril (ZESTRIL) 5 mg tablet, Take 1 tablet (5 mg total) by mouth daily, Disp: , Rfl:     MILK THISTLE PO, Take by mouth, Disp: , Rfl:     multivitamin (THERAGRAN) TABS, Take 1 tablet by mouth daily, Disp: , Rfl:     NON FORMULARY, CBD 500mg daily, Disp: , Rfl:     thiamine (VITAMIN B1) 100 mg tablet, Take 100 mg by mouth daily, Disp: , Rfl:     TURMERIC PO, Take by mouth, Disp: , Rfl:     ALLERGIES:  No Known Allergies    REVIEW OF SYSTEMS:  Review of Systems    VITALS:  Vitals:    21 1325   BP: 149/82   Pulse: 77       LABS:  HgA1c:   Lab Results   Component Value Date    HGBA1C 5 1 10/21/2020     BMP:   Lab Results   Component Value Date    CALCIUM 9 2 10/27/2020    K 3 7 10/27/2020    CO2 27 10/27/2020     10/27/2020    BUN 11 10/27/2020    CREATININE 0 85 10/27/2020       _____________________________________________________  PHYSICAL EXAMINATION:  General: well developed and well nourished, alert, oriented times 3 and appears comfortable  Psychiatric: Normal  HEENT: Trachea Midline, No torticollis  Pulmonary: No audible wheezing or strider  Cardiovascular: No discernable arrhythmia   Skin: No masses, erythema, lacerations, fluctation, ulcerations  Neurovascular: Sensation Intact to the Median, Ulnar, Radial Nerve, Motor Intact to the Median, Ulnar, Radial Nerve and Pulses Intact    MUSCULOSKELETAL EXAMINATION:  Right Carpal Tunnel Exam:    Negative thenar atrophy  Positive phalen's test  Positive carpal tunnel compression  Positive tinels over median nerve at the wrist   Opposition strength 5/5  Abduction strength 5/5       2 point discrimination is 4 mm throughout  Right Hand (Radial/Ulnar): Thumb: 4mm/4mm  Index: 4mm/4mm  Lonmm/*unable to determine   Rinmm/4mm  Small: 4mm/4mm         ___________________________________________________  STUDIES REVIEWED:  No studies reviewed         PROCEDURES PERFORMED:  Procedures  No Procedures performed today    _____________________________________________________  ASSESSMENT/PLAN:    Right Carpal tunnel  RUE EDx was ordered  *Pt was advised to take B6   *Pt will follow up in the office after EDx top schedule ECTR    Follow Up:  Return for EDx review  To Do Next Visit:  Re-evaluation of current issue    General Discussions:  Carpal Tunnel Syndrome: The anatomy and physiology of carpal tunnel syndrome was discussed with the patient today  Increase pressure localized under the transverse carpal ligament can cause pain, numbness, tingling, or dysesthesias within the median nerve distribution as well as feelings of fatigue, clumsiness, or awkwardness  These symptoms typically occur at night and worse in the morning upon waking  Eventually, untreated carpal tunnel syndrome can result in weakness and permanent loss of muscle within the thenar compartment of the hand  Treatment options were discussed with the patient  Conservative treatment includes nocturnal resting splints to keep the nerve in a neutral position, ergonomic changes within the work or home environment, activity modification, and tendon gliding exercises  Vitamin B6 one tablet daily over the counter may helpful to reduce symptoms  Steroid injections within the carpal canal can help a majority of patients, however this is often self-limited in a majority of patients  Surgical intervention to divide the transverse carpal ligament typically results in a long-lasting relief of the patient's complaints, with the recurrence rate of less than 1%                                                                                                                                                                                         Scribe Attestation    I,:  Kodi Walsh am acting as a scribe while in the presence of the attending physician :       I,:  Brandon Myles MD personally performed the services described in this documentation    as scribed in my presence :           Portions of the record may have been created with voice recognition software  Occasional wrong word or "sound a like" substitutions may have occurred due to the inherent limitations of voice recognition software  Read the chart carefully and recognize, using context, where substitutions have occurred

## 2021-03-25 NOTE — PROGRESS NOTES
CHIEF COMPLAINT:  Chief Complaint   Patient presents with    Right Wrist - Pain       SUBJECTIVE:  Joli Duverney is a 48y o  year old  male who presents right hand numbness and tingling  Numbness and tingling affectsindex finger, long finger, ring finger Patient states they DO have night time symptoms  They have used braces at night  Patient states the symptoms are constant  The symptoms have been going on for 1 year but worsening   PT denies previous care         PAST MEDICAL HISTORY:  Past Medical History:   Diagnosis Date    Hypertension     Pneumonia        PAST SURGICAL HISTORY:  Past Surgical History:   Procedure Laterality Date    FINGER SURGERY Left     index    KNEE ARTHROSCOPY Left     LUNG SURGERY Left        FAMILY HISTORY:  Family History   Problem Relation Age of Onset    Lymphoma Mother     Skin cancer Father     Pancreatic cancer Maternal Grandmother        SOCIAL HISTORY:  Social History     Tobacco Use    Smoking status: Current Every Day Smoker     Packs/day: 0 50     Years: 5 00     Pack years: 2 50    Smokeless tobacco: Never Used   Substance Use Topics    Alcohol use: Not Currently     Frequency: 4 or more times a week     Drinks per session: 1 or 2    Drug use: Not on file       MEDICATIONS:    Current Outpatient Medications:     aspirin 81 mg chewable tablet, Chew 81 mg daily, Disp: , Rfl:     lisinopril (ZESTRIL) 10 mg tablet, Take 10 mg by mouth daily, Disp: , Rfl:     lisinopril (ZESTRIL) 5 mg tablet, Take 1 tablet (5 mg total) by mouth daily, Disp: , Rfl:     MILK THISTLE PO, Take by mouth, Disp: , Rfl:     multivitamin (THERAGRAN) TABS, Take 1 tablet by mouth daily, Disp: , Rfl:     NON FORMULARY, CBD 500mg daily, Disp: , Rfl:     thiamine (VITAMIN B1) 100 mg tablet, Take 100 mg by mouth daily, Disp: , Rfl:     TURMERIC PO, Take by mouth, Disp: , Rfl:     ALLERGIES:  No Known Allergies    REVIEW OF SYSTEMS:  Review of Systems    VITALS:  Vitals:    03/25/21 1325 BP: 149/82   Pulse: 77       LABS:  HgA1c:   Lab Results   Component Value Date    HGBA1C 5 1 10/21/2020     BMP:   Lab Results   Component Value Date    CALCIUM 9 2 10/27/2020    K 3 7 10/27/2020    CO2 27 10/27/2020     10/27/2020    BUN 11 10/27/2020    CREATININE 0 85 10/27/2020       _____________________________________________________  PHYSICAL EXAMINATION:  General: well developed and well nourished, alert, oriented times 3 and appears comfortable  Psychiatric: Normal  HEENT: Trachea Midline, No torticollis  Pulmonary: No audible wheezing or strider  Cardiovascular: No discernable arrhythmia   Skin: No masses, erythema, lacerations, fluctation, ulcerations  Neurovascular: Sensation Intact to the Median, Ulnar, Radial Nerve, Motor Intact to the Median, Ulnar, Radial Nerve and Pulses Intact    MUSCULOSKELETAL EXAMINATION:  Right Carpal Tunnel Exam:    Negative thenar atrophy  Positive phalen's test  Positive carpal tunnel compression  Positive tinels over median nerve at the wrist   Opposition strength 5/5  Abduction strength 5/5       2 point discrimination is 4 mm throughout  Right Hand (Radial/Ulnar): Thumb: 4mm/4mm  Index: 4mm/4mm  Lonmm/*unable to determine   Rinmm/4mm  Small: 4mm/4mm         ___________________________________________________  STUDIES REVIEWED:  No studies reviewed  PROCEDURES PERFORMED:  Procedures  No Procedures performed today    _____________________________________________________  ASSESSMENT/PLAN:    Right Carpal tunnel  RUE EDx was ordered  *Pt was advised to take B6   *Pt will follow up in the office after EDx top schedule ECTR    Follow Up:  Return for EDx review  To Do Next Visit:  Re-evaluation of current issue    General Discussions:  Carpal Tunnel Syndrome: The anatomy and physiology of carpal tunnel syndrome was discussed with the patient today    Increase pressure localized under the transverse carpal ligament can cause pain, numbness, tingling, or dysesthesias within the median nerve distribution as well as feelings of fatigue, clumsiness, or awkwardness  These symptoms typically occur at night and worse in the morning upon waking  Eventually, untreated carpal tunnel syndrome can result in weakness and permanent loss of muscle within the thenar compartment of the hand  Treatment options were discussed with the patient  Conservative treatment includes nocturnal resting splints to keep the nerve in a neutral position, ergonomic changes within the work or home environment, activity modification, and tendon gliding exercises  Vitamin B6 one tablet daily over the counter may helpful to reduce symptoms  Steroid injections within the carpal canal can help a majority of patients, however this is often self-limited in a majority of patients  Surgical intervention to divide the transverse carpal ligament typically results in a long-lasting relief of the patient's complaints, with the recurrence rate of less than 1%  Scribe Attestation    I,:  Ameena Pena am acting as a scribe while in the presence of the attending physician :       I,:  Renetta Hernandez MD personally performed the services described in this documentation    as scribed in my presence :           Portions of the record may have been created with voice recognition software  Occasional wrong word or "sound a like" substitutions may have occurred due to the inherent limitations of voice recognition software  Read the chart carefully and recognize, using context, where substitutions have occurred

## 2021-05-04 ENCOUNTER — HOSPITAL ENCOUNTER (EMERGENCY)
Facility: HOSPITAL | Age: 56
Discharge: HOME | End: 2021-05-04
Attending: EMERGENCY MEDICINE | Admitting: EMERGENCY MEDICINE
Payer: COMMERCIAL

## 2021-05-04 VITALS
TEMPERATURE: 97.6 F | BODY MASS INDEX: 26.73 KG/M2 | DIASTOLIC BLOOD PRESSURE: 95 MMHG | RESPIRATION RATE: 15 BRPM | HEART RATE: 93 BPM | SYSTOLIC BLOOD PRESSURE: 151 MMHG | OXYGEN SATURATION: 97 % | WEIGHT: 197.09 LBS

## 2021-05-04 DIAGNOSIS — R22.0 FACIAL SWELLING: ICD-10-CM

## 2021-05-04 DIAGNOSIS — K04.7 DENTAL ABSCESS: Primary | ICD-10-CM

## 2021-05-04 PROCEDURE — 99284 EMERGENCY DEPT VISIT MOD MDM: CPT | Performed by: EMERGENCY MEDICINE

## 2021-05-04 PROCEDURE — 41800 DRAINAGE OF GUM LESION: CPT | Performed by: EMERGENCY MEDICINE

## 2021-05-04 PROCEDURE — 99283 EMERGENCY DEPT VISIT LOW MDM: CPT

## 2021-05-04 RX ORDER — LIDOCAINE HYDROCHLORIDE AND EPINEPHRINE 20; 5 MG/ML; UG/ML
1 INJECTION, SOLUTION EPIDURAL; INFILTRATION; INTRACAUDAL; PERINEURAL ONCE
Status: COMPLETED | OUTPATIENT
Start: 2021-05-04 | End: 2021-05-04

## 2021-05-04 RX ORDER — CHLORHEXIDINE GLUCONATE 0.12 MG/ML
15 RINSE ORAL 2 TIMES DAILY
Qty: 120 ML | Refills: 0 | Status: SHIPPED | OUTPATIENT
Start: 2021-05-04 | End: 2022-06-28

## 2021-05-04 RX ORDER — AMOXICILLIN AND CLAVULANATE POTASSIUM 875; 125 MG/1; MG/1
1 TABLET, FILM COATED ORAL ONCE
Status: COMPLETED | OUTPATIENT
Start: 2021-05-04 | End: 2021-05-04

## 2021-05-04 RX ORDER — AMOXICILLIN AND CLAVULANATE POTASSIUM 875; 125 MG/1; MG/1
1 TABLET, FILM COATED ORAL EVERY 12 HOURS
Qty: 14 TABLET | Refills: 0 | Status: SHIPPED | OUTPATIENT
Start: 2021-05-04 | End: 2021-05-11

## 2021-05-04 RX ADMIN — BENZOCAINE 1 APPLICATION: 220 GEL, DENTIFRICE DENTAL at 08:39

## 2021-05-04 RX ADMIN — LIDOCAINE HYDROCHLORIDE AND EPINEPHRINE 1 ML: 20; 5 INJECTION, SOLUTION EPIDURAL; INFILTRATION; INTRACAUDAL; PERINEURAL at 08:40

## 2021-05-04 RX ADMIN — AMOXICILLIN AND CLAVULANATE POTASSIUM 1 TABLET: 875; 125 TABLET, FILM COATED ORAL at 08:41

## 2021-05-04 NOTE — ED PROVIDER NOTES
Pt Name: Tegan Raphael  MRN: [id number]  [place] 1965  Age/Sex: 48 y o  male  Date of evaluation: 5/4/2021  PCP: Fercho [address]    Chief Complaint   Patient presents with    Facial Swelling     Pt presents with 2 day history of R maxillary swelling, denies difficulty swalloing  Reports tenderness to R lower maxilla, and that he lost a R upper tooth last month  Denies fevers and chills   Dental Swelling         HPI    48 y o  male presenting with right-sided facial swelling and pain  Patient states the symptoms began 2 days ago, has had worsening swelling over that time particularly worse over the course the morning today  Complains of mild to moderate dull pain to the right upper jaw, radiating to the right face, worse with pressing on the area for chewing and better at rest   He denies trauma, fever, nausea, vomiting, diarrhea, other symptoms  HPI      Past Medical and Surgical History    Past Medical History:   Diagnosis Date    Hypertension     Pneumonia        Past Surgical History:   Procedure Laterality Date    FINGER SURGERY Left     index    KNEE ARTHROSCOPY Left     LUNG SURGERY Left        Family History   Problem Relation Age of Onset    Lymphoma Mother     Skin cancer Father     Pancreatic cancer Maternal Grandmother        Social History     Tobacco Use    Smoking status: Current Every Day Smoker     Packs/day: 0 50     Years: 5 00     Pack years: 2 50    Smokeless tobacco: Never Used   Substance Use Topics    Alcohol use: Yes     Frequency: Monthly or less     Drinks per session: 1 or 2    Drug use: Never           Allergies    No Known Allergies    Home Medications    Prior to Admission medications    Medication Sig Start Date End Date Taking?  Authorizing Provider   aspirin 81 mg chewable tablet Chew 81 mg daily    Historical Provider, MD   lisinopril (ZESTRIL) 10 mg tablet Take 10 mg by mouth daily 3/16/21   Historical Provider, MD   lisinopril (ZESTRIL) 5 mg tablet Take 1 tablet (5 mg total) by mouth daily 3/12/21   BEL Barrett   MILK THISTLE PO Take by mouth    Historical Provider, MD   multivitamin (THERAGRAN) TABS Take 1 tablet by mouth daily    Historical Provider, MD   NON FORMULARY CBD 500mg daily    Historical Provider, MD   thiamine (VITAMIN B1) 100 mg tablet Take 100 mg by mouth daily    Historical Provider, MD   TURMERIC PO Take by mouth    Historical Provider, MD           Review of Systems    Review of Systems   Constitutional: Negative for appetite change, chills and diaphoresis  HENT: Positive for dental problem and facial swelling  Negative for drooling, trouble swallowing and voice change  Respiratory: Negative for apnea, shortness of breath and wheezing  Cardiovascular: Negative for chest pain and leg swelling  Gastrointestinal: Negative for abdominal distention, abdominal pain, diarrhea, nausea and vomiting  Genitourinary: Negative for dysuria and urgency  Musculoskeletal: Negative for arthralgias, back pain, gait problem and neck pain  Skin: Negative for color change, rash and wound  Neurological: Negative for seizures, speech difficulty, weakness and headaches  Psychiatric/Behavioral: Negative for agitation, behavioral problems and dysphoric mood  The patient is not nervous/anxious  All other systems reviewed and negative  Physical Exam      ED Triage Vitals [05/04/21 0809]   Temperature Pulse Respirations Blood Pressure SpO2   97 6 °F (36 4 °C) 93 15 151/95 97 %      Temp Source Heart Rate Source Patient Position - Orthostatic VS BP Location FiO2 (%)   Tympanic Monitor Sitting Left arm --      Pain Score       --               Physical Exam  Vitals signs and nursing note reviewed  Constitutional:       General: He is not in acute distress  Appearance: Normal appearance  He is well-developed  He is not ill-appearing or toxic-appearing  HENT:      Head: Normocephalic and atraumatic  Mouth/Throat:      Comments: Overall poor dentition, swelling and fluctuance noted of the buccal margin of the right upper jaw adjacent 1 the premolars which is cracked  Airway widely handling secretions and speaking with normal voice  Eyes:      Conjunctiva/sclera: Conjunctivae normal       Pupils: Pupils are equal, round, and reactive to light  Neck:      Musculoskeletal: Normal range of motion and neck supple  Trachea: No tracheal deviation  Cardiovascular:      Rate and Rhythm: Normal rate and regular rhythm  Heart sounds: Normal heart sounds  No murmur  Pulmonary:      Effort: Pulmonary effort is normal  No respiratory distress  Breath sounds: Normal breath sounds  No stridor  No wheezing or rales  Abdominal:      General: There is no distension  Palpations: Abdomen is soft  Tenderness: There is no abdominal tenderness  There is no guarding or rebound  Musculoskeletal: Normal range of motion  General: No deformity  Skin:     General: Skin is warm and dry  Findings: No rash  Neurological:      Mental Status: He is alert and oriented to person, place, and time  Psychiatric:         Behavior: Behavior normal          Thought Content: Thought content normal          Judgment: Judgment normal               Diagnostic Results      Labs:    Results Reviewed     None          All labs reviewed and utilized in the medical decision making process    Radiology:    No orders to display       All radiology studies independently viewed by me and interpreted by the radiologist     Procedure    Incision and drain    Date/Time: 5/4/2021 8:45 AM  Performed by: Nathaniel Berry MD  Authorized by: Nathaniel Berry MD   Universal Protocol:  Consent: Verbal consent obtained    Risks and benefits: risks, benefits and alternatives were discussed  Consent given by: patient  Time out: Immediately prior to procedure a "time out" was called to verify the correct patient, procedure, equipment, support staff and site/side marked as required  Patient location:  ED  Location:     Type:  Abscess    Size:  2 cm    Location:  Mouth    Location Detail:  Intraoral    Mouth location:  Alveolar process  Anesthesia (see MAR for exact dosages): Anesthesia method:  Topical application and local infiltration    Topical anesthesia: benzocaine lollipop  Local anesthetic:  Lidocaine 2% WITH epi  Procedure details:     Complexity:  Simple    Needle aspiration: no      Incision types:  Single straight    Scalpel blade:  11    Approach:  Open    Incision depth:  Submucosal    Drainage:  Purulent    Drainage amount:  Copious    Wound treatment:  Wound left open    Packing materials:  None  Post-procedure details:     Patient tolerance of procedure: Tolerated well, no immediate complications            ED Course of Care and Re-Assessments      Symptoms much improved with incision and drainage  Medications   BENZOCAINE (DENTAL) 20 % swab 1 application (1 application Oral Given 6/1/48 4939)   lidocaine-epinephrine (XYLOCAINE-MPF/EPINEPHRINE) 2 %-1:200,000 injection 1 mL (1 mL Infiltration Given by Other 5/4/21 0440)   amoxicillin-clavulanate (AUGMENTIN) 875-125 mg per tablet 1 tablet (1 tablet Oral Given 5/4/21 0841)           FINAL IMPRESSION    Final diagnoses:   Dental abscess   Facial swelling         DISPOSITION/PLAN    Presentations as above most consistent with dental abscess, likely periapical abscess with extension/erosion towards the cheek  Reactive swelling around the maxillary area, nontoxic-appearing and tolerant of oral intake  Started on Augmentin, incised and drained as above with copious purulent drainage and improvement of symptoms  Discharged with strict return precautions , follow-up with primary care doctor and OMS  Patient counseled regarding likelihood of recurrence infection of definitive treatment is not performed    Hemodynamically stable and comfortable at time of discharge  Time reflects when diagnosis was documented in both MDM as applicable and the Disposition within this note     Time User Action Codes Description Comment    5/4/2021  9:04 AM Malik Urias Add [K04 7] Dental abscess     5/4/2021  9:04 AM Malik Urias Add [R22 0] Facial swelling       ED Disposition     ED Disposition Condition Date/Time Comment    Discharge Stable Tue May 4, 2021  9:04 AM Marcela Gilliland discharge to home/self care              Follow-up Information     Follow up With Specialties Details Why Contact Info Additional 2000 [facility] Emergency Department Emergency Medicine Go to  If symptoms worsen 34 [facility] 109 [facility] Emergency Department, [address] [place], [place], [address], [address] [facility] Call  As needed 4199 [facility] [id number]       [facility] Aidan  Call today To schedule close followup for definitive treatment of your infection Devon Pappas [address] [place],9 Emergency Department  34 [facility] [id number] [phone]  Go to   If symptoms worsen    [facility] 89  [phone]    Call   As needed    [facility] Aidan [address]  [address]  [phone]  Call today  To schedule close followup for definitive treatment of your infection      DISCHARGE MEDICATIONS:    Discharge Medication List as of 5/4/2021  9:05 AM      START taking these medications    Details   amoxicillin-clavulanate (AUGMENTIN) 875-125 mg per tablet Take 1 tablet by mouth every 12 (twelve) hours for 7 days, Starting Tue 5/4/2021, Until Tue 5/11/2021, Print      chlorhexidine (PERIDEX) 0 12 % solution Apply 15 mL to the mouth or throat 2 (two) times a day, Starting Tue 5/4/2021, Print         CONTINUE these medications which have NOT CHANGED    Details   aspirin 81 mg chewable tablet Chew 81 mg daily, Historical Med      !! lisinopril (ZESTRIL) 10 mg tablet Take 10 mg by mouth daily, Starting Tue 3/16/2021, Historical Med      !! lisinopril (ZESTRIL) 5 mg tablet Take 1 tablet (5 mg total) by mouth daily, Starting Fri 3/12/2021, No Print      MILK THISTLE PO Take by mouth, Historical Med      multivitamin (THERAGRAN) TABS Take 1 tablet by mouth daily, Historical Med      NON FORMULARY CBD 500mg daily, Historical Med      thiamine (VITAMIN B1) 100 mg tablet Take 100 mg by mouth daily, Historical Med      TURMERIC PO Take by mouth, Historical Med       !! - Potential duplicate medications found  Please discuss with provider  No discharge procedures on file           MD Anais Jade MD  05/04/21 1006

## 2021-05-05 ENCOUNTER — PROCEDURE VISIT (OUTPATIENT)
Dept: NEUROLOGY | Facility: CLINIC | Age: 56
End: 2021-05-05
Payer: COMMERCIAL

## 2021-05-05 DIAGNOSIS — G56.01 CARPAL TUNNEL SYNDROME OF RIGHT WRIST: ICD-10-CM

## 2021-05-05 PROCEDURE — 95908 NRV CNDJ TST 3-4 STUDIES: CPT | Performed by: PSYCHIATRY & NEUROLOGY

## 2021-05-05 PROCEDURE — 95886 MUSC TEST DONE W/N TEST COMP: CPT | Performed by: PSYCHIATRY & NEUROLOGY

## 2021-05-05 NOTE — PROGRESS NOTES
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 ) there is less numbness and tingling  He does find that if he wears a wrist splint his symptoms are less prominent    Motor and sensory conduction studies were performed on the right median, ulnar as well as radial sensory nerves  The median distal motor latency was prolonged at 9 2 milliseconds while the ulnar latency was normal  The motor action potential amplitudes were normal  Motor conduction velocities were normal including conduction of the ulnar nerve across the elbow  The right median F-waves are significantly prolonged and ulnar F waves were normal     Right median distal sensory latency was of very low amplitude in approximately did to be delayed at 6 5 milliseconds while no reproducible response despite averaging was noted at the median nerve with palmar stimulation  The, radial and ulnar sensory latencies were normal with normal sensory action potential amplitudes  Concentric needle EMG was performed in various distal and proximal muscles of the right upper extremity including APB, FDI, EDC, brachial radialis, biceps, triceps in the low cervical paraspinal region  There was no evidence of spontaneous activity seen   The compound motor unit potentials were of normal configuration and interference patterns were full or full for effort    IMPRESSION: This is an abnormal EMG of the right upper extremity due to evidence of a localized neuropathic process involving the right median nerve at the wrist consistent with moderate to severe carpal tunnel syndrome with predominantly demyelinative change    TALAT Armando

## 2021-06-08 ENCOUNTER — OFFICE VISIT (OUTPATIENT)
Dept: FAMILY MEDICINE CLINIC | Facility: CLINIC | Age: 56
End: 2021-06-08
Payer: COMMERCIAL

## 2021-06-08 VITALS
HEIGHT: 72 IN | HEART RATE: 87 BPM | SYSTOLIC BLOOD PRESSURE: 146 MMHG | OXYGEN SATURATION: 96 % | WEIGHT: 193 LBS | DIASTOLIC BLOOD PRESSURE: 82 MMHG | BODY MASS INDEX: 26.14 KG/M2 | RESPIRATION RATE: 14 BRPM

## 2021-06-08 DIAGNOSIS — K04.7 DENTAL ABSCESS: Primary | ICD-10-CM

## 2021-06-08 PROCEDURE — 3008F BODY MASS INDEX DOCD: CPT | Performed by: FAMILY MEDICINE

## 2021-06-08 PROCEDURE — 3079F DIAST BP 80-89 MM HG: CPT | Performed by: FAMILY MEDICINE

## 2021-06-08 PROCEDURE — 99213 OFFICE O/P EST LOW 20 MIN: CPT | Performed by: FAMILY MEDICINE

## 2021-06-08 PROCEDURE — 3077F SYST BP >= 140 MM HG: CPT | Performed by: FAMILY MEDICINE

## 2021-06-08 RX ORDER — CLINDAMYCIN HYDROCHLORIDE 300 MG/1
300 CAPSULE ORAL 3 TIMES DAILY
Qty: 30 CAPSULE | Refills: 0 | Status: SHIPPED | OUTPATIENT
Start: 2021-06-08 | End: 2021-06-18

## 2021-06-08 NOTE — PROGRESS NOTES
Assessment/Plan:     Chronic Problems:  No problem-specific Assessment & Plan notes found for this encounter  Visit Diagnosis:  Diagnoses and all orders for this visit:    Dental abscess  -     clindamycin (CLEOCIN) 300 MG capsule; Take 1 capsule (300 mg total) by mouth 3 (three) times a day for 10 days    Discussed plan care stressed importance evaluation with dental provider, antibiotics as directed, with food, continue current oral hygiene practices      Subjective:    Patient ID: Marianna Carlos is a 48 y o  male  Right facial / dental pain   Using mouth wash ,   Has yet to be seen by dental    mild swelling right side upper   Self treating mouthwash Motrin/Tylenol   negative fever chills, negative drooling/ dysphasia    Abscess  This is a new problem  The current episode started 1 to 4 weeks ago  The problem occurs constantly  The symptoms are aggravated by eating         The following portions of the patient's history were reviewed and updated as appropriate: allergies, current medications, past family history, past medical history, past social history, past surgical history and problem list     Review of Systems      /82   Pulse 87   Resp 14   Ht 6' (1 829 m)   Wt 87 5 kg (193 lb)   SpO2 96%   BMI 26 18 kg/m²   Social History     Socioeconomic History    Marital status: Single     Spouse name: Not on file    Number of children: Not on file    Years of education: Not on file    Highest education level: Not on file   Occupational History    Not on file   Social Needs    Financial resource strain: Not on file    Food insecurity     Worry: Not on file     Inability: Not on file    Transportation needs     Medical: Not on file     Non-medical: Not on file   Tobacco Use    Smoking status: Current Every Day Smoker     Packs/day: 0 50     Years: 5 00     Pack years: 2 50    Smokeless tobacco: Never Used   Substance and Sexual Activity    Alcohol use: Yes     Frequency: Monthly or less Drinks per session: 1 or 2    Drug use: Never    Sexual activity: Not on file   Lifestyle    Physical activity     Days per week: Not on file     Minutes per session: Not on file    Stress: Not on file   Relationships    Social connections     Talks on phone: Not on file     Gets together: Not on file     Attends [employer] service: Not on file     Active member of club or organization: Not on file     Attends meetings of clubs or organizations: Not on file     Relationship status: Not on file    Intimate partner violence     Fear of current or ex partner: Not on file     Emotionally abused: Not on file     Physically abused: Not on file     Forced sexual activity: Not on file   Other Topics Concern    Not on file   Social History Narrative    Not on file     Past Medical History:   Diagnosis Date    Hypertension     Pneumonia      Family History   Problem Relation Age of Onset    Lymphoma Mother     Skin cancer Father     Pancreatic cancer Maternal Grandmother      Past Surgical History:   Procedure Laterality Date    FINGER SURGERY Left     index    KNEE ARTHROSCOPY Left     LUNG SURGERY Left        Current Outpatient Medications:     aspirin 81 mg chewable tablet, Chew 81 mg daily, Disp: , Rfl:     chlorhexidine (PERIDEX) 0 12 % solution, Apply 15 mL to the mouth or throat 2 (two) times a day, Disp: 120 mL, Rfl: 0    lisinopril (ZESTRIL) 10 mg tablet, Take 10 mg by mouth daily, Disp: , Rfl:     lisinopril (ZESTRIL) 5 mg tablet, Take 1 tablet (5 mg total) by mouth daily, Disp: , Rfl:     MILK THISTLE PO, Take by mouth, Disp: , Rfl:     multivitamin (THERAGRAN) TABS, Take 1 tablet by mouth daily, Disp: , Rfl:     NON FORMULARY, CBD 500mg daily, Disp: , Rfl:     thiamine (VITAMIN B1) 100 mg tablet, Take 100 mg by mouth daily, Disp: , Rfl:     TURMERIC PO, Take by mouth, Disp: , Rfl:     clindamycin (CLEOCIN) 300 MG capsule, Take 1 capsule (300 mg total) by mouth 3 (three) times a day for 10 days, Disp: 30 capsule, Rfl: 0    No Known Allergies       Lab Review   not applicable     Imaging: No results found  Objective:     Physical Exam  Constitutional:       General: He is not in acute distress  Appearance: He is not ill-appearing  Cardiovascular:      Rate and Rhythm: Normal rate  Pulmonary:      Effort: Pulmonary effort is normal  No respiratory distress  Lymphadenopathy:      Cervical: No cervical adenopathy  Skin:     General: Skin is warm and dry  There are no Patient Instructions on file for this visit  BEL Howell    Portions of the record may have been created with voice recognition software  Occasional wrong word or "sound a like" substitutions may have occurred due to the inherent limitations of voice recognition software  Read the chart carefully and recognize, using context, where substitutions have occurred

## 2021-09-13 DIAGNOSIS — I10 ESSENTIAL HYPERTENSION: Primary | ICD-10-CM

## 2021-09-13 RX ORDER — LISINOPRIL 10 MG/1
TABLET ORAL
Qty: 90 TABLET | Refills: 0 | Status: SHIPPED | OUTPATIENT
Start: 2021-09-13 | End: 2021-10-01 | Stop reason: SDUPTHER

## 2021-10-01 DIAGNOSIS — I10 ESSENTIAL HYPERTENSION: ICD-10-CM

## 2021-10-01 RX ORDER — LISINOPRIL 10 MG/1
10 TABLET ORAL DAILY
Qty: 90 TABLET | Refills: 0 | Status: SHIPPED | OUTPATIENT
Start: 2021-10-01 | End: 2021-10-08 | Stop reason: SDUPTHER

## 2021-10-08 ENCOUNTER — OFFICE VISIT (OUTPATIENT)
Dept: FAMILY MEDICINE CLINIC | Facility: CLINIC | Age: 56
End: 2021-10-08
Payer: COMMERCIAL

## 2021-10-08 VITALS
BODY MASS INDEX: 23.81 KG/M2 | RESPIRATION RATE: 18 BRPM | HEART RATE: 75 BPM | WEIGHT: 175.8 LBS | OXYGEN SATURATION: 98 % | DIASTOLIC BLOOD PRESSURE: 90 MMHG | TEMPERATURE: 96.1 F | HEIGHT: 72 IN | SYSTOLIC BLOOD PRESSURE: 140 MMHG

## 2021-10-08 DIAGNOSIS — Z00.00 ANNUAL PHYSICAL EXAM: Primary | ICD-10-CM

## 2021-10-08 DIAGNOSIS — Z00.00 HEALTHCARE MAINTENANCE: ICD-10-CM

## 2021-10-08 DIAGNOSIS — I10 ESSENTIAL HYPERTENSION: ICD-10-CM

## 2021-10-08 DIAGNOSIS — R63.4 UNINTENDED WEIGHT LOSS: ICD-10-CM

## 2021-10-08 DIAGNOSIS — Z72.89 ALCOHOL USE: ICD-10-CM

## 2021-10-08 DIAGNOSIS — Z12.5 PROSTATE CANCER SCREENING: ICD-10-CM

## 2021-10-08 PROBLEM — Z78.9 ALCOHOL USE: Status: ACTIVE | Noted: 2021-10-08

## 2021-10-08 PROCEDURE — 3080F DIAST BP >= 90 MM HG: CPT | Performed by: NURSE PRACTITIONER

## 2021-10-08 PROCEDURE — 99396 PREV VISIT EST AGE 40-64: CPT | Performed by: NURSE PRACTITIONER

## 2021-10-08 PROCEDURE — 4004F PT TOBACCO SCREEN RCVD TLK: CPT | Performed by: NURSE PRACTITIONER

## 2021-10-08 PROCEDURE — 3008F BODY MASS INDEX DOCD: CPT | Performed by: NURSE PRACTITIONER

## 2021-10-08 PROCEDURE — 3725F SCREEN DEPRESSION PERFORMED: CPT | Performed by: NURSE PRACTITIONER

## 2021-10-08 PROCEDURE — 3077F SYST BP >= 140 MM HG: CPT | Performed by: NURSE PRACTITIONER

## 2021-10-08 RX ORDER — LISINOPRIL 20 MG/1
20 TABLET ORAL DAILY
Qty: 30 TABLET | Refills: 0 | Status: SHIPPED | OUTPATIENT
Start: 2021-10-08 | End: 2021-11-23 | Stop reason: SDUPTHER

## 2021-11-19 ENCOUNTER — IMMUNIZATIONS (OUTPATIENT)
Dept: FAMILY MEDICINE CLINIC | Facility: HOSPITAL | Age: 56
End: 2021-11-19
Payer: COMMERCIAL

## 2021-11-19 ENCOUNTER — APPOINTMENT (OUTPATIENT)
Dept: LAB | Facility: HOSPITAL | Age: 56
End: 2021-11-19
Payer: COMMERCIAL

## 2021-11-19 ENCOUNTER — HOSPITAL ENCOUNTER (OUTPATIENT)
Dept: RADIOLOGY | Facility: HOSPITAL | Age: 56
Discharge: HOME | End: 2021-11-19
Payer: COMMERCIAL

## 2021-11-19 DIAGNOSIS — Z23 ENCOUNTER FOR IMMUNIZATION: Primary | ICD-10-CM

## 2021-11-19 DIAGNOSIS — I10 ESSENTIAL HYPERTENSION: ICD-10-CM

## 2021-11-19 DIAGNOSIS — Z00.00 HEALTHCARE MAINTENANCE: ICD-10-CM

## 2021-11-19 DIAGNOSIS — Z12.5 PROSTATE CANCER SCREENING: ICD-10-CM

## 2021-11-19 DIAGNOSIS — R63.4 UNINTENDED WEIGHT LOSS: ICD-10-CM

## 2021-11-19 LAB
ALBUMIN SERPL BCP-MCNC: 4 G/DL (ref 3.5–5)
ALP SERPL-CCNC: 78 U/L (ref 46–116)
ALT SERPL W P-5'-P-CCNC: 72 U/L (ref 12–78)
ANION GAP SERPL CALCULATED.3IONS-SCNC: 7 MMOL/L (ref 4–13)
AST SERPL W P-5'-P-CCNC: 67 U/L (ref 5–45)
BASOPHILS # BLD AUTO: 0.07 THOUSANDS/ΜL (ref 0–0.1)
BASOPHILS NFR BLD AUTO: 1 % (ref 0–1)
BILIRUB SERPL-MCNC: 0.95 MG/DL (ref 0.2–1)
BUN SERPL-MCNC: 6 MG/DL (ref 5–25)
CALCIUM SERPL-MCNC: 9.2 MG/DL (ref 8.3–10.1)
CHLORIDE SERPL-SCNC: 101 MMOL/L (ref 100–108)
CHOLEST SERPL-MCNC: 239 MG/DL (ref 50–200)
CO2 SERPL-SCNC: 32 MMOL/L (ref 21–32)
CREAT SERPL-MCNC: 0.91 MG/DL (ref 0.6–1.3)
EOSINOPHIL # BLD AUTO: 0.17 THOUSAND/ΜL (ref 0–0.61)
EOSINOPHIL NFR BLD AUTO: 3 % (ref 0–6)
ERYTHROCYTE [DISTWIDTH] IN BLOOD BY AUTOMATED COUNT: 12.3 % (ref 11.6–15.1)
GFR SERPL CREATININE-BSD FRML MDRD: 95 ML/MIN/1.73SQ M
GLUCOSE P FAST SERPL-MCNC: 101 MG/DL (ref 65–99)
HCT VFR BLD AUTO: 47.5 % (ref 36.5–49.3)
HDLC SERPL-MCNC: 78 MG/DL
HGB BLD-MCNC: 15.4 G/DL (ref 12–17)
IMM GRANULOCYTES # BLD AUTO: 0.02 THOUSAND/UL (ref 0–0.2)
IMM GRANULOCYTES NFR BLD AUTO: 0 % (ref 0–2)
LDLC SERPL CALC-MCNC: 149 MG/DL (ref 0–100)
LYMPHOCYTES # BLD AUTO: 2.24 THOUSANDS/ΜL (ref 0.6–4.47)
LYMPHOCYTES NFR BLD AUTO: 36 % (ref 14–44)
MCH RBC QN AUTO: 32.4 PG (ref 26.8–34.3)
MCHC RBC AUTO-ENTMCNC: 32.4 G/DL (ref 31.4–37.4)
MCV RBC AUTO: 100 FL (ref 82–98)
MONOCYTES # BLD AUTO: 0.72 THOUSAND/ΜL (ref 0.17–1.22)
MONOCYTES NFR BLD AUTO: 12 % (ref 4–12)
NEUTROPHILS # BLD AUTO: 3.03 THOUSANDS/ΜL (ref 1.85–7.62)
NEUTS SEG NFR BLD AUTO: 48 % (ref 43–75)
NONHDLC SERPL-MCNC: 161 MG/DL
NRBC BLD AUTO-RTO: 0 /100 WBCS
PLATELET # BLD AUTO: 159 THOUSANDS/UL (ref 149–390)
PMV BLD AUTO: 10.1 FL (ref 8.9–12.7)
POTASSIUM SERPL-SCNC: 4.6 MMOL/L (ref 3.5–5.3)
PROT SERPL-MCNC: 7.8 G/DL (ref 6.4–8.2)
PSA SERPL-MCNC: 0.6 NG/ML (ref 0–4)
RBC # BLD AUTO: 4.75 MILLION/UL (ref 3.88–5.62)
SODIUM SERPL-SCNC: 140 MMOL/L (ref 136–145)
T4 FREE SERPL-MCNC: 0.74 NG/DL (ref 0.76–1.46)
TRIGL SERPL-MCNC: 62 MG/DL
TSH SERPL DL<=0.05 MIU/L-ACNC: 4.88 UIU/ML (ref 0.36–3.74)
WBC # BLD AUTO: 6.25 THOUSAND/UL (ref 4.31–10.16)

## 2021-11-19 PROCEDURE — 84443 ASSAY THYROID STIM HORMONE: CPT

## 2021-11-19 PROCEDURE — 84439 ASSAY OF FREE THYROXINE: CPT

## 2021-11-19 PROCEDURE — G0103 PSA SCREENING: HCPCS

## 2021-11-19 PROCEDURE — 80061 LIPID PANEL: CPT

## 2021-11-19 PROCEDURE — 36415 COLL VENOUS BLD VENIPUNCTURE: CPT

## 2021-11-19 PROCEDURE — 80053 COMPREHEN METABOLIC PANEL: CPT

## 2021-11-19 PROCEDURE — 85025 COMPLETE CBC W/AUTO DIFF WBC: CPT

## 2021-11-19 PROCEDURE — 71046 X-RAY EXAM CHEST 2 VIEWS: CPT

## 2021-11-19 PROCEDURE — 0001A COVID-19 PFIZER VACC 0.3 ML: CPT

## 2021-11-19 PROCEDURE — 91300 COVID-19 PFIZER VACC 0.3 ML: CPT

## 2021-11-23 ENCOUNTER — OFFICE VISIT (OUTPATIENT)
Dept: FAMILY MEDICINE CLINIC | Facility: CLINIC | Age: 56
End: 2021-11-23
Payer: COMMERCIAL

## 2021-11-23 VITALS
HEIGHT: 72 IN | WEIGHT: 174 LBS | DIASTOLIC BLOOD PRESSURE: 90 MMHG | HEART RATE: 84 BPM | BODY MASS INDEX: 23.57 KG/M2 | SYSTOLIC BLOOD PRESSURE: 142 MMHG | OXYGEN SATURATION: 98 %

## 2021-11-23 DIAGNOSIS — E03.9 HYPOTHYROIDISM, UNSPECIFIED TYPE: Primary | ICD-10-CM

## 2021-11-23 DIAGNOSIS — I10 ESSENTIAL HYPERTENSION: ICD-10-CM

## 2021-11-23 DIAGNOSIS — E78.2 MIXED HYPERLIPIDEMIA: ICD-10-CM

## 2021-11-23 PROCEDURE — 3008F BODY MASS INDEX DOCD: CPT | Performed by: NURSE PRACTITIONER

## 2021-11-23 PROCEDURE — 99214 OFFICE O/P EST MOD 30 MIN: CPT | Performed by: NURSE PRACTITIONER

## 2021-11-23 RX ORDER — LISINOPRIL 20 MG/1
20 TABLET ORAL DAILY
Qty: 90 TABLET | Refills: 0 | Status: SHIPPED | OUTPATIENT
Start: 2021-11-23 | End: 2022-03-04 | Stop reason: SDUPTHER

## 2021-12-10 ENCOUNTER — IMMUNIZATIONS (OUTPATIENT)
Dept: FAMILY MEDICINE CLINIC | Facility: HOSPITAL | Age: 56
End: 2021-12-10

## 2021-12-10 DIAGNOSIS — Z23 ENCOUNTER FOR IMMUNIZATION: Primary | ICD-10-CM

## 2021-12-10 PROCEDURE — 91300 COVID-19 PFIZER VACC 0.3 ML: CPT

## 2021-12-10 PROCEDURE — 0002A COVID-19 PFIZER VACC 0.3 ML: CPT

## 2021-12-22 ENCOUNTER — HOSPITAL ENCOUNTER (OUTPATIENT)
Dept: ULTRASOUND IMAGING | Facility: HOSPITAL | Age: 56
Discharge: HOME | End: 2021-12-22
Payer: COMMERCIAL

## 2021-12-22 DIAGNOSIS — E03.9 HYPOTHYROIDISM, UNSPECIFIED TYPE: ICD-10-CM

## 2021-12-22 PROCEDURE — 76536 US EXAM OF HEAD AND NECK: CPT

## 2021-12-28 ENCOUNTER — OFFICE VISIT (OUTPATIENT)
Dept: FAMILY MEDICINE CLINIC | Facility: CLINIC | Age: 56
End: 2021-12-28
Payer: COMMERCIAL

## 2021-12-28 VITALS
OXYGEN SATURATION: 95 % | DIASTOLIC BLOOD PRESSURE: 90 MMHG | HEIGHT: 72 IN | HEART RATE: 88 BPM | RESPIRATION RATE: 18 BRPM | TEMPERATURE: 97.2 F | WEIGHT: 183.6 LBS | SYSTOLIC BLOOD PRESSURE: 126 MMHG | BODY MASS INDEX: 24.87 KG/M2

## 2021-12-28 DIAGNOSIS — E03.9 HYPOTHYROIDISM, UNSPECIFIED TYPE: ICD-10-CM

## 2021-12-28 DIAGNOSIS — I10 ESSENTIAL HYPERTENSION: Primary | ICD-10-CM

## 2021-12-28 PROCEDURE — 99214 OFFICE O/P EST MOD 30 MIN: CPT | Performed by: NURSE PRACTITIONER

## 2021-12-28 PROCEDURE — 3008F BODY MASS INDEX DOCD: CPT | Performed by: NURSE PRACTITIONER

## 2022-03-04 DIAGNOSIS — I10 ESSENTIAL HYPERTENSION: ICD-10-CM

## 2022-03-04 RX ORDER — LISINOPRIL 20 MG/1
20 TABLET ORAL DAILY
Qty: 90 TABLET | Refills: 0 | Status: SHIPPED | OUTPATIENT
Start: 2022-03-04 | End: 2022-06-09 | Stop reason: SDUPTHER

## 2022-04-28 ENCOUNTER — TELEPHONE (OUTPATIENT)
Dept: FAMILY MEDICINE CLINIC | Facility: CLINIC | Age: 57
End: 2022-04-28

## 2022-04-28 NOTE — TELEPHONE ENCOUNTER
Azam Lopez called stating when he had a dental abscess, you sent in amoxicillin for him  He wants to know if you can send in that medication for him again please?

## 2022-06-09 DIAGNOSIS — I10 ESSENTIAL HYPERTENSION: ICD-10-CM

## 2022-06-09 RX ORDER — LISINOPRIL 20 MG/1
20 TABLET ORAL DAILY
Qty: 90 TABLET | Refills: 0 | Status: SHIPPED | OUTPATIENT
Start: 2022-06-09

## 2022-06-27 ENCOUNTER — APPOINTMENT (OUTPATIENT)
Dept: LAB | Facility: HOSPITAL | Age: 57
End: 2022-06-27
Payer: COMMERCIAL

## 2022-06-27 DIAGNOSIS — E03.9 HYPOTHYROIDISM, UNSPECIFIED TYPE: ICD-10-CM

## 2022-06-27 LAB — TSH SERPL DL<=0.05 MIU/L-ACNC: 4.16 UIU/ML (ref 0.45–4.5)

## 2022-06-27 PROCEDURE — 86376 MICROSOMAL ANTIBODY EACH: CPT

## 2022-06-27 PROCEDURE — 84443 ASSAY THYROID STIM HORMONE: CPT

## 2022-06-27 PROCEDURE — 36415 COLL VENOUS BLD VENIPUNCTURE: CPT

## 2022-06-28 ENCOUNTER — OFFICE VISIT (OUTPATIENT)
Dept: FAMILY MEDICINE CLINIC | Facility: CLINIC | Age: 57
End: 2022-06-28
Payer: COMMERCIAL

## 2022-06-28 VITALS
DIASTOLIC BLOOD PRESSURE: 74 MMHG | HEART RATE: 67 BPM | HEIGHT: 72 IN | OXYGEN SATURATION: 99 % | SYSTOLIC BLOOD PRESSURE: 112 MMHG | WEIGHT: 172.2 LBS | BODY MASS INDEX: 23.32 KG/M2 | TEMPERATURE: 97 F

## 2022-06-28 DIAGNOSIS — M54.41 CHRONIC BILATERAL LOW BACK PAIN WITH RIGHT-SIDED SCIATICA: Primary | ICD-10-CM

## 2022-06-28 DIAGNOSIS — Z00.00 HEALTHCARE MAINTENANCE: ICD-10-CM

## 2022-06-28 DIAGNOSIS — Z12.5 PROSTATE CANCER SCREENING: ICD-10-CM

## 2022-06-28 DIAGNOSIS — E03.9 HYPOTHYROIDISM, UNSPECIFIED TYPE: ICD-10-CM

## 2022-06-28 DIAGNOSIS — I10 ESSENTIAL HYPERTENSION: ICD-10-CM

## 2022-06-28 DIAGNOSIS — G89.29 CHRONIC BILATERAL LOW BACK PAIN WITH RIGHT-SIDED SCIATICA: Primary | ICD-10-CM

## 2022-06-28 PROBLEM — Z78.9 ALCOHOL USE: Status: RESOLVED | Noted: 2021-10-08 | Resolved: 2022-06-28

## 2022-06-28 PROBLEM — Z72.89 ALCOHOL USE: Status: RESOLVED | Noted: 2021-10-08 | Resolved: 2022-06-28

## 2022-06-28 LAB — THYROPEROXIDASE AB SERPL-ACNC: <8 IU/ML (ref 0–34)

## 2022-06-28 PROCEDURE — 3725F SCREEN DEPRESSION PERFORMED: CPT | Performed by: NURSE PRACTITIONER

## 2022-06-28 PROCEDURE — 3074F SYST BP LT 130 MM HG: CPT | Performed by: NURSE PRACTITIONER

## 2022-06-28 PROCEDURE — 99214 OFFICE O/P EST MOD 30 MIN: CPT | Performed by: NURSE PRACTITIONER

## 2022-06-28 PROCEDURE — 4004F PT TOBACCO SCREEN RCVD TLK: CPT | Performed by: NURSE PRACTITIONER

## 2022-06-28 PROCEDURE — 3008F BODY MASS INDEX DOCD: CPT | Performed by: NURSE PRACTITIONER

## 2022-06-28 PROCEDURE — 3078F DIAST BP <80 MM HG: CPT | Performed by: NURSE PRACTITIONER

## 2022-06-28 NOTE — PROGRESS NOTES
Assessment/Plan:     Chronic Problems:  Hypothyroidism  TSH wnl      Essential hypertension  BP well controlled today  Continue lisinopril  Visit Diagnosis:  Diagnoses and all orders for this visit:    Chronic bilateral low back pain with right-sided sciatica  Comments: Will start PT and obtain lumbar spine xray  Orders:  -     XR spine lumbar minimum 4 views non injury; Future  -     Ambulatory Referral to Physical Therapy; Future    Healthcare maintenance  -     CBC and differential; Future  -     Comprehensive metabolic panel; Future  -     Lipid panel; Future  -     TSH, 3rd generation with Free T4 reflex; Future    Prostate cancer screening  -     PSA, Total Screen; Future    Essential hypertension    Hypothyroidism, unspecified type          Subjective:    Patient ID: Monica Fitzgerald is a 55 y o  male  Patient presents for routine follow up  He did have a GI bug a few weeks ago  He feels his swallowing since then has been slightly off/burning  Patient does have lower back pain for 30 years on and off  Right lower leg has been numb for a few years  He did see a chiropractor in the past  He stretches and it helps  Denies past imaging  The following portions of the patient's history were reviewed and updated as appropriate: allergies, current medications, past family history, past medical history, past social history, past surgical history and problem list     Review of Systems   Constitutional: Negative for chills and fever  HENT: Negative for ear pain and sore throat  Eyes: Negative for pain and visual disturbance  Respiratory: Negative for cough and shortness of breath  Cardiovascular: Negative for chest pain and palpitations  Gastrointestinal: Negative for abdominal pain and vomiting  Genitourinary: Negative for dysuria and hematuria  Musculoskeletal: Negative for arthralgias and back pain  Skin: Negative for color change and rash     Neurological: Negative for dizziness, light-headedness and headaches  Psychiatric/Behavioral: Negative for dysphoric mood and sleep disturbance  The patient is not nervous/anxious  All other systems reviewed and are negative  /74 (BP Location: Left arm, Patient Position: Sitting)   Pulse 67   Temp (!) 97 °F (36 1 °C) (Tympanic)   Ht 6' (1 829 m)   Wt 78 1 kg (172 lb 3 2 oz)   SpO2 99%   BMI 23 35 kg/m²   Social History     Socioeconomic History    Marital status: Single     Spouse name: Not on file    Number of children: Not on file    Years of education: Not on file    Highest education level: Not on file   Occupational History    Not on file   Tobacco Use    Smoking status: Current Every Day Smoker     Packs/day: 0 50     Years: 5 00     Pack years: 2 50    Smokeless tobacco: Never Used   Vaping Use    Vaping Use: Never used   Substance and Sexual Activity    Alcohol use:  Yes    Drug use: Never    Sexual activity: Not on file   Other Topics Concern    Not on file   Social History Narrative    Not on file     Social Determinants of Health     Financial Resource Strain: Not on file   Food Insecurity: Not on file   Transportation Needs: Not on file   Physical Activity: Not on file   Stress: Not on file   Social Connections: Not on file   Intimate Partner Violence: Not on file   Housing Stability: Not on file     Past Medical History:   Diagnosis Date    Hypertension     Pneumonia      Family History   Problem Relation Age of Onset    Lymphoma Mother     Skin cancer Father     Pancreatic cancer Maternal Grandmother      Past Surgical History:   Procedure Laterality Date    FINGER SURGERY Left     index    KNEE ARTHROSCOPY Left     LUNG SURGERY Left        Current Outpatient Medications:     aspirin 81 mg chewable tablet, Chew 81 mg daily, Disp: , Rfl:     lisinopril (ZESTRIL) 20 mg tablet, Take 1 tablet (20 mg total) by mouth daily, Disp: 90 tablet, Rfl: 0    MILK THISTLE PO, Take by mouth, Disp: , Rfl:    multivitamin (THERAGRAN) TABS, Take 1 tablet by mouth daily, Disp: , Rfl:     NON FORMULARY, CBD 500mg daily, Disp: , Rfl:     thiamine (VITAMIN B1) 100 mg tablet, Take 100 mg by mouth daily, Disp: , Rfl:     TURMERIC PO, Take by mouth, Disp: , Rfl:     No Known Allergies       Lab Review   Appointment on 06/27/2022   Component Date Value    THYROID MICROSOMAL ANTIB* 06/27/2022 <8     TSH 3RD GENERATON 06/27/2022 4 155         Imaging: No results found  Objective:     Physical Exam  Constitutional:       General: He is not in acute distress  Appearance: He is well-developed  Cardiovascular:      Rate and Rhythm: Normal rate and regular rhythm  Heart sounds: Normal heart sounds  No murmur heard  No gallop  Pulmonary:      Effort: Pulmonary effort is normal  No respiratory distress  Breath sounds: Normal breath sounds  No wheezing  Musculoskeletal:         General: Normal range of motion  Skin:     General: Skin is warm and dry  Neurological:      Mental Status: He is alert and oriented to person, place, and time  Psychiatric:         Mood and Affect: Mood normal            There are no Patient Instructions on file for this visit  BEL Barrett    Portions of the record may have been created with voice recognition software  Occasional wrong word or "sound a like" substitutions may have occurred due to the inherent limitations of voice recognition software  Read the chart carefully and recognize, using context, where substitutions have occurred

## 2022-09-23 DIAGNOSIS — I10 ESSENTIAL HYPERTENSION: ICD-10-CM

## 2022-09-23 RX ORDER — LISINOPRIL 20 MG/1
20 TABLET ORAL DAILY
Qty: 90 TABLET | Refills: 0 | Status: SHIPPED | OUTPATIENT
Start: 2022-09-23

## 2023-01-03 DIAGNOSIS — I10 ESSENTIAL HYPERTENSION: ICD-10-CM

## 2023-01-03 RX ORDER — LISINOPRIL 20 MG/1
20 TABLET ORAL DAILY
Qty: 30 TABLET | Refills: 0 | Status: SHIPPED | OUTPATIENT
Start: 2023-01-03

## 2023-02-09 DIAGNOSIS — I10 ESSENTIAL HYPERTENSION: ICD-10-CM

## 2023-02-09 RX ORDER — LISINOPRIL 20 MG/1
20 TABLET ORAL DAILY
Qty: 30 TABLET | Refills: 0 | Status: SHIPPED | OUTPATIENT
Start: 2023-02-09

## 2023-03-21 DIAGNOSIS — I10 ESSENTIAL HYPERTENSION: ICD-10-CM

## 2023-03-21 RX ORDER — LISINOPRIL 20 MG/1
20 TABLET ORAL DAILY
Qty: 30 TABLET | Refills: 0 | Status: SHIPPED | OUTPATIENT
Start: 2023-03-21

## 2023-05-01 DIAGNOSIS — I10 ESSENTIAL HYPERTENSION: ICD-10-CM

## 2023-05-01 RX ORDER — LISINOPRIL 20 MG/1
20 TABLET ORAL DAILY
Qty: 30 TABLET | Refills: 0 | Status: SHIPPED | OUTPATIENT
Start: 2023-05-01

## 2023-06-07 DIAGNOSIS — I10 ESSENTIAL HYPERTENSION: ICD-10-CM

## 2023-06-08 RX ORDER — LISINOPRIL 20 MG/1
20 TABLET ORAL DAILY
Qty: 30 TABLET | Refills: 0 | Status: SHIPPED | OUTPATIENT
Start: 2023-06-08

## 2023-07-17 DIAGNOSIS — I10 ESSENTIAL HYPERTENSION: ICD-10-CM

## 2023-07-17 RX ORDER — LISINOPRIL 20 MG/1
20 TABLET ORAL DAILY
Qty: 30 TABLET | Refills: 0 | OUTPATIENT
Start: 2023-07-17

## 2023-07-18 ENCOUNTER — OFFICE VISIT (OUTPATIENT)
Dept: FAMILY MEDICINE CLINIC | Facility: CLINIC | Age: 58
End: 2023-07-18
Payer: COMMERCIAL

## 2023-07-18 VITALS
HEART RATE: 110 BPM | WEIGHT: 177 LBS | OXYGEN SATURATION: 97 % | TEMPERATURE: 96.6 F | HEIGHT: 72 IN | SYSTOLIC BLOOD PRESSURE: 122 MMHG | BODY MASS INDEX: 23.98 KG/M2 | DIASTOLIC BLOOD PRESSURE: 98 MMHG

## 2023-07-18 DIAGNOSIS — Z12.5 SCREENING FOR PROSTATE CANCER: ICD-10-CM

## 2023-07-18 DIAGNOSIS — F41.9 ANXIETY: ICD-10-CM

## 2023-07-18 DIAGNOSIS — Z00.00 ANNUAL PHYSICAL EXAM: Primary | ICD-10-CM

## 2023-07-18 DIAGNOSIS — I10 ESSENTIAL HYPERTENSION: ICD-10-CM

## 2023-07-18 DIAGNOSIS — Z00.00 HEALTHCARE MAINTENANCE: ICD-10-CM

## 2023-07-18 PROCEDURE — 99396 PREV VISIT EST AGE 40-64: CPT | Performed by: NURSE PRACTITIONER

## 2023-07-18 RX ORDER — LISINOPRIL 20 MG/1
20 TABLET ORAL DAILY
Qty: 90 TABLET | Refills: 1 | Status: SHIPPED | OUTPATIENT
Start: 2023-07-18

## 2023-07-18 RX ORDER — PAROXETINE 10 MG/1
10 TABLET, FILM COATED ORAL DAILY
Qty: 90 TABLET | Refills: 0 | Status: SHIPPED | OUTPATIENT
Start: 2023-07-18

## 2023-07-18 NOTE — PROGRESS NOTES
Patient presents for routine follow up. He is feeling anxious-- a lot happening the past few months. Sold his house, new grandbaby. He was on paxil in the past and did very well. He is not sleeping well. [address]    NAME: Jaycee Kocher  AGE: 65 y.o. SEX: male  : 1965     DATE: 2023     Assessment and Plan:     Problem List Items Addressed This Visit        Cardiovascular and Mediastinum    Essential hypertension     BP ok today. Continue lisinopril. Relevant Medications    lisinopril (ZESTRIL) 20 mg tablet    Other Relevant Orders    Albumin / creatinine urine ratio       Other    Anxiety     Will start on paxil as he did well with this in the past.          Relevant Medications    PARoxetine (PAXIL) 10 mg tablet   Other Visit Diagnoses     Annual physical exam    -  Primary    Screening for prostate cancer        Relevant Orders    PSA, Total Screen    Healthcare maintenance        Relevant Orders    CBC and differential    Comprehensive metabolic panel    Lipid panel    TSH, 3rd generation with Free T4 reflex          Immunizations and preventive care screenings were discussed with patient today. Appropriate education was printed on patient's after visit summary. Discussed risks and benefits of prostate cancer screening. We discussed the controversial history of PSA screening for prostate cancer in the [facility] as well as the risk of over detection and over treatment of prostate cancer by way of PSA screening. The patient understands that PSA blood testing is an imperfect way to screen for prostate cancer and that elevated PSA levels in the blood may also be caused by infection, inflammation, prostatic trauma or manipulation, urological procedures, or by benign prostatic enlargement.     The role of the digital rectal examination in prostate cancer screening was also discussed and I discussed with him that there is large interobserver variability in the findings of digital rectal examination. Counseling:  · Exercise: the importance of regular exercise/physical activity was discussed. Recommend exercise 3-5 times per week for at least 30 minutes. Depression Screening and Follow-up Plan: Patient was screened for depression during today's encounter. They screened negative with a PHQ-2 score of 0. Return in about 6 months (around 1/18/2024). Chief Complaint:     Chief Complaint   Patient presents with   • Medication Refill      History of Present Illness:     Adult Annual Physical   Patient here for a comprehensive physical exam. The patient reports no problems. Diet and Physical Activity  · Diet/Nutrition: well balanced diet. · Exercise: no formal exercise. Depression Screening  PHQ-2/9 Depression Screening    Little interest or pleasure in doing things: 0 - not at all  Feeling down, depressed, or hopeless: 0 - not at all  PHQ-2 Score: 0  PHQ-2 Interpretation: Negative depression screen       General Health  · Sleep: sleeps poorly. · Hearing: normal - none . · Vision: most recent eye exam >1 year ago and wears glasses. · Dental: no dental visits for >1 year.  Health  · Symptoms include: none     Review of Systems:     Review of Systems   Constitutional: Positive for fatigue. Negative for chills, diaphoresis and fever. HENT: Negative for ear pain and sore throat. Eyes: Negative for pain and visual disturbance. Respiratory: Negative for cough and shortness of breath. Cardiovascular: Negative for chest pain and palpitations. Gastrointestinal: Negative for abdominal pain, constipation, diarrhea and vomiting. Genitourinary: Negative for dysuria and hematuria. Musculoskeletal: Negative for arthralgias and back pain. Skin: Negative for color change and rash. Neurological: Negative for dizziness, light-headedness and headaches. Psychiatric/Behavioral: Positive for sleep disturbance. Negative for dysphoric mood. The patient is nervous/anxious. All other systems reviewed and are negative. Past Medical History:     Past Medical History:   Diagnosis Date   • Hypertension    • Pneumonia       Past Surgical History:     Past Surgical History:   Procedure Laterality Date   • FINGER SURGERY Left     index   • KNEE ARTHROSCOPY Left    • LUNG SURGERY Left       Family History:     Family History   Problem Relation Age of Onset   • Lymphoma Mother    • Skin cancer Father    • Pancreatic cancer Maternal Grandmother       Social History:     Social History     Socioeconomic History   • Marital status: Single     Spouse name: None   • Number of children: None   • Years of education: None   • Highest education level: None   Occupational History   • None   Tobacco Use   • Smoking status: Every Day     Packs/day: 0.50     Years: 5.00     Total pack years: 2.50     Types: Cigarettes   • Smokeless tobacco: Never   Vaping Use   • Vaping Use: Never used   Substance and Sexual Activity   • Alcohol use: Yes   • Drug use: Never   • Sexual activity: None   Other Topics Concern   • None   Social History Narrative   • None     Social Determinants of Health     Financial Resource Strain: Not on file   Food Insecurity: Not on file   Transportation Needs: Not on file   Physical Activity: Not on file   Stress: Not on file   Social Connections: Not on file   Intimate Partner Violence: Not on file   Housing Stability: Not on file      Current Medications:     Current Outpatient Medications   Medication Sig Dispense Refill   • aspirin 81 mg chewable tablet Chew 81 mg daily     • lisinopril (ZESTRIL) 20 mg tablet Take 1 tablet (20 mg total) by mouth daily 90 tablet 1   • PARoxetine (PAXIL) 10 mg tablet Take 1 tablet (10 mg total) by mouth daily 90 tablet 0     No current facility-administered medications for this visit.       Allergies:     No Known Allergies Physical Exam:     /98 (BP Location: Left arm, Patient Position: Sitting, Cuff Size: Standard)   Pulse (!) 110   Temp (!) 96.6 °F (35.9 °C)   Ht 6' (1.829 m)   Wt 80.3 kg (177 lb)   SpO2 97%   BMI 24.01 kg/m²     Physical Exam  Vitals and nursing note reviewed. Constitutional:       General: He is not in acute distress. Appearance: He is well-developed. HENT:      Head: Normocephalic and atraumatic. Eyes:      Conjunctiva/sclera: Conjunctivae normal.   Cardiovascular:      Rate and Rhythm: Normal rate and regular rhythm. Heart sounds: No murmur heard. Pulmonary:      Effort: Pulmonary effort is normal. No respiratory distress. Breath sounds: Normal breath sounds. Abdominal:      Palpations: Abdomen is soft. Tenderness: There is no abdominal tenderness. Musculoskeletal:         General: No swelling. Cervical back: Neck supple. Skin:     General: Skin is warm and dry. Capillary Refill: Capillary refill takes less than 2 seconds. Neurological:      Mental Status: He is alert and oriented to person, place, and time.    Psychiatric:         Mood and Affect: Mood normal.          Cristina Dalal, [address] [facility]

## 2023-10-24 DIAGNOSIS — I10 ESSENTIAL HYPERTENSION: ICD-10-CM

## 2023-10-24 RX ORDER — LISINOPRIL 20 MG/1
20 TABLET ORAL DAILY
Qty: 90 TABLET | Refills: 1 | Status: SHIPPED | OUTPATIENT
Start: 2023-10-24

## 2024-02-08 ENCOUNTER — OFFICE VISIT (OUTPATIENT)
Dept: FAMILY MEDICINE CLINIC | Facility: CLINIC | Age: 59
End: 2024-02-08
Payer: COMMERCIAL

## 2024-02-08 VITALS
DIASTOLIC BLOOD PRESSURE: 80 MMHG | OXYGEN SATURATION: 96 % | WEIGHT: 180 LBS | SYSTOLIC BLOOD PRESSURE: 124 MMHG | BODY MASS INDEX: 24.38 KG/M2 | HEART RATE: 92 BPM | TEMPERATURE: 97.9 F | HEIGHT: 72 IN

## 2024-02-08 DIAGNOSIS — J06.9 UPPER RESPIRATORY TRACT INFECTION, UNSPECIFIED TYPE: Primary | ICD-10-CM

## 2024-02-08 DIAGNOSIS — I10 ESSENTIAL HYPERTENSION: ICD-10-CM

## 2024-02-08 PROCEDURE — 99214 OFFICE O/P EST MOD 30 MIN: CPT | Performed by: FAMILY MEDICINE

## 2024-02-08 RX ORDER — DEXTROMETHORPHAN HYDROBROMIDE AND PROMETHAZINE HYDROCHLORIDE 15; 6.25 MG/5ML; MG/5ML
5 SYRUP ORAL 4 TIMES DAILY PRN
Qty: 180 ML | Refills: 0 | Status: SHIPPED | OUTPATIENT
Start: 2024-02-08

## 2024-02-08 RX ORDER — PREDNISONE 20 MG/1
20 TABLET ORAL 2 TIMES DAILY WITH MEALS
Qty: 8 TABLET | Refills: 0 | Status: SHIPPED | OUTPATIENT
Start: 2024-02-08

## 2024-02-08 RX ORDER — LISINOPRIL 20 MG/1
20 TABLET ORAL DAILY
Qty: 90 TABLET | Refills: 1 | Status: SHIPPED | OUTPATIENT
Start: 2024-02-08

## 2024-02-08 RX ORDER — ALBUTEROL SULFATE 90 UG/1
2 AEROSOL, METERED RESPIRATORY (INHALATION) EVERY 6 HOURS PRN
Qty: 6.7 G | Refills: 5 | Status: SHIPPED | OUTPATIENT
Start: 2024-02-08

## 2024-02-08 RX ORDER — AZITHROMYCIN 250 MG/1
TABLET, FILM COATED ORAL
Qty: 6 TABLET | Refills: 0 | Status: SHIPPED | OUTPATIENT
Start: 2024-02-08 | End: 2024-02-12

## 2024-02-08 NOTE — PROGRESS NOTES
Assessment/Plan:     Chronic Problems:  No problem-specific Assessment & Plan notes found for this encounter.      Visit Diagnosis:  Diagnoses and all orders for this visit:    Upper respiratory tract infection, unspecified type  -     XR chest pa & lateral; Future  -     azithromycin (ZITHROMAX) 250 mg tablet; Take 2 tablets today then 1 tablet daily x 4 days  -     albuterol (Proventil HFA) 90 mcg/act inhaler; Inhale 2 puffs every 6 (six) hours as needed for wheezing  -     promethazine-dextromethorphan (PHENERGAN-DM) 6.25-15 mg/5 mL oral syrup; Take 5 mL by mouth 4 (four) times a day as needed for cough  -     predniSONE 20 mg tablet; Take 1 tablet (20 mg total) by mouth 2 (two) times a day with meals    Essential hypertension  -     lisinopril (ZESTRIL) 20 mg tablet; Take 1 tablet (20 mg total) by mouth daily    COVID influenza negative, obtain chest x-ray stat, reviewed care Z-Joseph instructed on inhaler use Mucinex fluids prednisone burst  Increase oral hydration, warm tea with honey, increase nutrition   Adequate rest  Tylenol or Motrin for pain and/or fever  Nasal mist  Humidify room  Use decongestant- Mucinex  Zinc lozenges   Good handwashing  Call for any changes failure to resolve    Subjective:    Patient ID: Vladimir Delvalle is a 58 y.o. male.    C/o cough and headache   For the past 10 day   Has not checked self for covid   Chill+ bodyache+   Has not been able to check temp   Ill contact + brother   Neg travel   Hx of smoking        Cough  Associated symptoms include headaches. Pertinent negatives include no chest pain, chills, ear pain, fever, myalgias, postnasal drip, rash, rhinorrhea, sore throat or shortness of breath. There is no history of environmental allergies.   Headache  Medication Refill  Associated symptoms include coughing and headaches. Pertinent negatives include no abdominal pain, arthralgias, chest pain, chills, congestion, fever, joint swelling, myalgias, nausea, numbness, rash, sore  throat, vomiting or weakness.       The following portions of the patient's history were reviewed and updated as appropriate: allergies, current medications, past family history, past medical history, past social history, past surgical history and problem list.    Review of Systems   Constitutional:  Negative for appetite change, chills, fever and unexpected weight change.   HENT:  Negative for congestion, dental problem, ear pain, hearing loss, postnasal drip, rhinorrhea, sinus pressure, sinus pain, sneezing, sore throat, tinnitus and voice change.    Eyes:  Negative for visual disturbance.   Respiratory:  Positive for cough. Negative for apnea, chest tightness and shortness of breath.    Cardiovascular:  Negative for chest pain, palpitations and leg swelling.   Gastrointestinal:  Negative for abdominal pain, blood in stool, constipation, diarrhea, nausea and vomiting.   Endocrine: Negative for cold intolerance, heat intolerance, polydipsia, polyphagia and polyuria.   Genitourinary:  Negative for decreased urine volume, difficulty urinating, dysuria, frequency and hematuria.   Musculoskeletal:  Negative for arthralgias, back pain, gait problem, joint swelling and myalgias.   Skin:  Negative for color change, rash and wound.   Allergic/Immunologic: Negative for environmental allergies and food allergies.   Neurological:  Positive for headaches. Negative for dizziness, syncope, weakness, light-headedness and numbness.   Hematological:  Negative for adenopathy. Does not bruise/bleed easily.   Psychiatric/Behavioral:  Negative for sleep disturbance and suicidal ideas. The patient is not nervous/anxious.          /80   Pulse 92   Temp 97.9 °F (36.6 °C)   Ht 6' (1.829 m)   Wt 81.6 kg (180 lb)   SpO2 96%   BMI 24.41 kg/m²   Social History     Socioeconomic History    Marital status: Single     Spouse name: Not on file    Number of children: Not on file    Years of education: Not on file    Highest education  level: Not on file   Occupational History    Not on file   Tobacco Use    Smoking status: Every Day     Current packs/day: 0.50     Average packs/day: 0.5 packs/day for 5.0 years (2.5 ttl pk-yrs)     Types: Cigarettes    Smokeless tobacco: Never   Vaping Use    Vaping status: Never Used   Substance and Sexual Activity    Alcohol use: Yes    Drug use: Never    Sexual activity: Not on file   Other Topics Concern    Not on file   Social History Narrative    Not on file     Social Determinants of Health     Financial Resource Strain: Not on file   Food Insecurity: Not on file   Transportation Needs: Not on file   Physical Activity: Not on file   Stress: Not on file   Social Connections: Not on file   Intimate Partner Violence: Not on file   Housing Stability: Not on file     Past Medical History:   Diagnosis Date    Hypertension     Pneumonia      Family History   Problem Relation Age of Onset    Lymphoma Mother     Skin cancer Father     Pancreatic cancer Maternal Grandmother      Past Surgical History:   Procedure Laterality Date    FINGER SURGERY Left     index    KNEE ARTHROSCOPY Left     LUNG SURGERY Left        Current Outpatient Medications:     albuterol (Proventil HFA) 90 mcg/act inhaler, Inhale 2 puffs every 6 (six) hours as needed for wheezing, Disp: 6.7 g, Rfl: 5    aspirin 81 mg chewable tablet, Chew 81 mg daily, Disp: , Rfl:     azithromycin (ZITHROMAX) 250 mg tablet, Take 2 tablets today then 1 tablet daily x 4 days, Disp: 6 tablet, Rfl: 0    lisinopril (ZESTRIL) 20 mg tablet, Take 1 tablet (20 mg total) by mouth daily, Disp: 90 tablet, Rfl: 1    PARoxetine (PAXIL) 10 mg tablet, Take 1 tablet (10 mg total) by mouth daily, Disp: 90 tablet, Rfl: 0    predniSONE 20 mg tablet, Take 1 tablet (20 mg total) by mouth 2 (two) times a day with meals, Disp: 8 tablet, Rfl: 0    promethazine-dextromethorphan (PHENERGAN-DM) 6.25-15 mg/5 mL oral syrup, Take 5 mL by mouth 4 (four) times a day as needed for cough, Disp:  "180 mL, Rfl: 0    No Known Allergies       Lab Review   not applicable     Imaging: No results found.    Objective:     Physical Exam  Constitutional:       General: He is not in acute distress.     Appearance: He is well-developed. He is not ill-appearing or toxic-appearing.   HENT:      Head: Normocephalic and atraumatic.   Cardiovascular:      Rate and Rhythm: Normal rate and regular rhythm.      Heart sounds: Normal heart sounds.   Pulmonary:      Effort: Pulmonary effort is normal.      Breath sounds: Wheezing present.      Comments: Nebulizer treatment given with improved aeration diminished wheezing  Abdominal:      General: Bowel sounds are normal.      Palpations: Abdomen is soft.   Musculoskeletal:         General: Normal range of motion.      Cervical back: Normal range of motion and neck supple.   Skin:     General: Skin is warm and dry.   Neurological:      Mental Status: He is alert and oriented to person, place, and time.      Deep Tendon Reflexes: Reflexes are normal and symmetric.   Psychiatric:         Behavior: Behavior normal.         Thought Content: Thought content normal.         Judgment: Judgment normal.           There are no Patient Instructions on file for this visit.    BEL Chong    Portions of the record may have been created with voice recognition software.  Occasional wrong word or \"sound a like\" substitutions may have occurred due to the inherent limitations of voice recognition software.  Read the chart carefully and recognize, using context, where substitutions have occurred.  "

## 2024-02-12 ENCOUNTER — HOSPITAL ENCOUNTER (OUTPATIENT)
Dept: RADIOLOGY | Facility: HOSPITAL | Age: 59
Discharge: HOME | End: 2024-02-12
Payer: COMMERCIAL

## 2024-02-12 DIAGNOSIS — J06.9 UPPER RESPIRATORY TRACT INFECTION, UNSPECIFIED TYPE: ICD-10-CM

## 2024-02-12 PROCEDURE — 71046 X-RAY EXAM CHEST 2 VIEWS: CPT

## 2024-05-11 ENCOUNTER — HOSPITAL ENCOUNTER (OUTPATIENT)
Facility: HOSPITAL | Age: 59
Setting detail: OBSERVATION
Discharge: HOME | End: 2024-05-13
Attending: EMERGENCY MEDICINE | Admitting: INTERNAL MEDICINE
Payer: COMMERCIAL

## 2024-05-11 ENCOUNTER — APPOINTMENT (EMERGENCY)
Dept: CT IMAGING | Facility: HOSPITAL | Age: 59
End: 2024-05-11
Payer: COMMERCIAL

## 2024-05-11 DIAGNOSIS — R19.7 VOMITING AND DIARRHEA: Primary | ICD-10-CM

## 2024-05-11 DIAGNOSIS — N17.9 AKI (ACUTE KIDNEY INJURY) (HCC): ICD-10-CM

## 2024-05-11 DIAGNOSIS — F10.20 ALCOHOLISM (HCC): ICD-10-CM

## 2024-05-11 DIAGNOSIS — R11.10 VOMITING AND DIARRHEA: Primary | ICD-10-CM

## 2024-05-11 PROBLEM — R10.9 ABDOMINAL PAIN: Status: ACTIVE | Noted: 2024-05-11

## 2024-05-11 PROBLEM — F10.90 ALCOHOL USE DISORDER: Status: ACTIVE | Noted: 2024-05-11

## 2024-05-11 PROBLEM — R74.01 TRANSAMINITIS: Status: ACTIVE | Noted: 2024-05-11

## 2024-05-11 PROBLEM — E03.9 HYPOTHYROIDISM: Status: RESOLVED | Noted: 2021-11-23 | Resolved: 2024-05-11

## 2024-05-11 PROBLEM — R53.1 GENERALIZED WEAKNESS: Status: ACTIVE | Noted: 2024-05-11

## 2024-05-11 LAB
ALBUMIN SERPL BCP-MCNC: 4.1 G/DL (ref 3.5–5)
ALP SERPL-CCNC: 86 U/L (ref 34–104)
ALT SERPL W P-5'-P-CCNC: 74 U/L (ref 7–52)
ANION GAP SERPL CALCULATED.3IONS-SCNC: 20 MMOL/L (ref 4–13)
AST SERPL W P-5'-P-CCNC: 163 U/L (ref 13–39)
BASOPHILS # BLD AUTO: 0.04 THOUSANDS/ÂΜL (ref 0–0.1)
BASOPHILS NFR BLD AUTO: 1 % (ref 0–1)
BILIRUB SERPL-MCNC: 1.77 MG/DL (ref 0.2–1)
BUN SERPL-MCNC: 38 MG/DL (ref 5–25)
CALCIUM SERPL-MCNC: 9.1 MG/DL (ref 8.4–10.2)
CHLORIDE SERPL-SCNC: 88 MMOL/L (ref 96–108)
CO2 SERPL-SCNC: 25 MMOL/L (ref 21–32)
CREAT SERPL-MCNC: 2.33 MG/DL (ref 0.6–1.3)
EOSINOPHIL # BLD AUTO: 0.11 THOUSAND/ÂΜL (ref 0–0.61)
EOSINOPHIL NFR BLD AUTO: 1 % (ref 0–6)
ERYTHROCYTE [DISTWIDTH] IN BLOOD BY AUTOMATED COUNT: 12.6 % (ref 11.6–15.1)
ETHANOL SERPL-MCNC: 73 MG/DL
GFR SERPL CREATININE-BSD FRML MDRD: 29 ML/MIN/1.73SQ M
GLUCOSE SERPL-MCNC: 77 MG/DL (ref 65–140)
HCT VFR BLD AUTO: 39.6 % (ref 36.5–49.3)
HGB BLD-MCNC: 13.9 G/DL (ref 12–17)
IMM GRANULOCYTES # BLD AUTO: 0.07 THOUSAND/UL (ref 0–0.2)
IMM GRANULOCYTES NFR BLD AUTO: 1 % (ref 0–2)
LIPASE SERPL-CCNC: 95 U/L (ref 11–82)
LYMPHOCYTES # BLD AUTO: 1.32 THOUSANDS/ÂΜL (ref 0.6–4.47)
LYMPHOCYTES NFR BLD AUTO: 17 % (ref 14–44)
MCH RBC QN AUTO: 33.7 PG (ref 26.8–34.3)
MCHC RBC AUTO-ENTMCNC: 35.1 G/DL (ref 31.4–37.4)
MCV RBC AUTO: 96 FL (ref 82–98)
MONOCYTES # BLD AUTO: 0.95 THOUSAND/ÂΜL (ref 0.17–1.22)
MONOCYTES NFR BLD AUTO: 12 % (ref 4–12)
NEUTROPHILS # BLD AUTO: 5.2 THOUSANDS/ÂΜL (ref 1.85–7.62)
NEUTS SEG NFR BLD AUTO: 68 % (ref 43–75)
NRBC BLD AUTO-RTO: 0 /100 WBCS
PLATELET # BLD AUTO: 102 THOUSANDS/UL (ref 149–390)
PMV BLD AUTO: 11.2 FL (ref 8.9–12.7)
POTASSIUM SERPL-SCNC: 3.3 MMOL/L (ref 3.5–5.3)
PROT SERPL-MCNC: 7.4 G/DL (ref 6.4–8.4)
RBC # BLD AUTO: 4.12 MILLION/UL (ref 3.88–5.62)
SODIUM SERPL-SCNC: 133 MMOL/L (ref 135–147)
WBC # BLD AUTO: 7.69 THOUSAND/UL (ref 4.31–10.16)

## 2024-05-11 PROCEDURE — 85025 COMPLETE CBC W/AUTO DIFF WBC: CPT | Performed by: EMERGENCY MEDICINE

## 2024-05-11 PROCEDURE — 99284 EMERGENCY DEPT VISIT MOD MDM: CPT

## 2024-05-11 PROCEDURE — 36415 COLL VENOUS BLD VENIPUNCTURE: CPT | Performed by: EMERGENCY MEDICINE

## 2024-05-11 PROCEDURE — 82077 ASSAY SPEC XCP UR&BREATH IA: CPT | Performed by: EMERGENCY MEDICINE

## 2024-05-11 PROCEDURE — 83690 ASSAY OF LIPASE: CPT | Performed by: EMERGENCY MEDICINE

## 2024-05-11 PROCEDURE — 99223 1ST HOSP IP/OBS HIGH 75: CPT | Performed by: INTERNAL MEDICINE

## 2024-05-11 PROCEDURE — C9113 INJ PANTOPRAZOLE SODIUM, VIA: HCPCS | Performed by: EMERGENCY MEDICINE

## 2024-05-11 PROCEDURE — 74176 CT ABD & PELVIS W/O CONTRAST: CPT

## 2024-05-11 PROCEDURE — 96361 HYDRATE IV INFUSION ADD-ON: CPT

## 2024-05-11 PROCEDURE — 80053 COMPREHEN METABOLIC PANEL: CPT | Performed by: EMERGENCY MEDICINE

## 2024-05-11 PROCEDURE — 96374 THER/PROPH/DIAG INJ IV PUSH: CPT

## 2024-05-11 PROCEDURE — 99285 EMERGENCY DEPT VISIT HI MDM: CPT | Performed by: EMERGENCY MEDICINE

## 2024-05-11 PROCEDURE — 96375 TX/PRO/DX INJ NEW DRUG ADDON: CPT

## 2024-05-11 RX ORDER — ONDANSETRON 2 MG/ML
4 INJECTION INTRAMUSCULAR; INTRAVENOUS EVERY 6 HOURS PRN
Status: DISCONTINUED | OUTPATIENT
Start: 2024-05-11 | End: 2024-05-13 | Stop reason: HOSPADM

## 2024-05-11 RX ORDER — FOLIC ACID 1 MG/1
1 TABLET ORAL DAILY
Status: DISCONTINUED | OUTPATIENT
Start: 2024-05-11 | End: 2024-05-13 | Stop reason: HOSPADM

## 2024-05-11 RX ORDER — ALBUTEROL SULFATE 90 UG/1
2 AEROSOL, METERED RESPIRATORY (INHALATION) EVERY 6 HOURS PRN
Status: DISCONTINUED | OUTPATIENT
Start: 2024-05-11 | End: 2024-05-13 | Stop reason: HOSPADM

## 2024-05-11 RX ORDER — ASPIRIN 81 MG/1
81 TABLET, CHEWABLE ORAL DAILY
Status: DISCONTINUED | OUTPATIENT
Start: 2024-05-11 | End: 2024-05-13 | Stop reason: HOSPADM

## 2024-05-11 RX ORDER — PANTOPRAZOLE SODIUM 40 MG/10ML
40 INJECTION, POWDER, LYOPHILIZED, FOR SOLUTION INTRAVENOUS ONCE
Status: COMPLETED | OUTPATIENT
Start: 2024-05-11 | End: 2024-05-11

## 2024-05-11 RX ORDER — SODIUM CHLORIDE, SODIUM GLUCONATE, SODIUM ACETATE, POTASSIUM CHLORIDE, MAGNESIUM CHLORIDE, SODIUM PHOSPHATE, DIBASIC, AND POTASSIUM PHOSPHATE .53; .5; .37; .037; .03; .012; .00082 G/100ML; G/100ML; G/100ML; G/100ML; G/100ML; G/100ML; G/100ML
150 INJECTION, SOLUTION INTRAVENOUS CONTINUOUS
Status: DISCONTINUED | OUTPATIENT
Start: 2024-05-11 | End: 2024-05-12

## 2024-05-11 RX ORDER — LORAZEPAM 1 MG/1
2 TABLET ORAL ONCE
Status: COMPLETED | OUTPATIENT
Start: 2024-05-11 | End: 2024-05-11

## 2024-05-11 RX ORDER — PAROXETINE HYDROCHLORIDE 20 MG/1
10 TABLET, FILM COATED ORAL DAILY
Status: DISCONTINUED | OUTPATIENT
Start: 2024-05-11 | End: 2024-05-13 | Stop reason: HOSPADM

## 2024-05-11 RX ORDER — MAGNESIUM HYDROXIDE/ALUMINUM HYDROXICE/SIMETHICONE 120; 1200; 1200 MG/30ML; MG/30ML; MG/30ML
30 SUSPENSION ORAL ONCE
Status: COMPLETED | OUTPATIENT
Start: 2024-05-11 | End: 2024-05-11

## 2024-05-11 RX ORDER — LANOLIN ALCOHOL/MO/W.PET/CERES
100 CREAM (GRAM) TOPICAL DAILY
Status: DISCONTINUED | OUTPATIENT
Start: 2024-05-11 | End: 2024-05-13 | Stop reason: HOSPADM

## 2024-05-11 RX ORDER — ONDANSETRON 2 MG/ML
4 INJECTION INTRAMUSCULAR; INTRAVENOUS ONCE
Status: COMPLETED | OUTPATIENT
Start: 2024-05-11 | End: 2024-05-11

## 2024-05-11 RX ORDER — HEPARIN SODIUM 5000 [USP'U]/ML
5000 INJECTION, SOLUTION INTRAVENOUS; SUBCUTANEOUS EVERY 8 HOURS SCHEDULED
Status: DISCONTINUED | OUTPATIENT
Start: 2024-05-11 | End: 2024-05-13 | Stop reason: HOSPADM

## 2024-05-11 RX ADMIN — ALUMINUM HYDROXIDE, MAGNESIUM HYDROXIDE, AND DIMETHICONE 30 ML: 200; 20; 200 SUSPENSION ORAL at 10:54

## 2024-05-11 RX ADMIN — THIAMINE HCL TAB 100 MG 100 MG: 100 TAB at 13:02

## 2024-05-11 RX ADMIN — ONDANSETRON 4 MG: 2 INJECTION INTRAMUSCULAR; INTRAVENOUS at 10:50

## 2024-05-11 RX ADMIN — SODIUM CHLORIDE, SODIUM GLUCONATE, SODIUM ACETATE, POTASSIUM CHLORIDE, MAGNESIUM CHLORIDE, SODIUM PHOSPHATE, DIBASIC, AND POTASSIUM PHOSPHATE 150 ML/HR: .53; .5; .37; .037; .03; .012; .00082 INJECTION, SOLUTION INTRAVENOUS at 22:29

## 2024-05-11 RX ADMIN — HEPARIN SODIUM 5000 UNITS: 5000 INJECTION INTRAVENOUS; SUBCUTANEOUS at 13:02

## 2024-05-11 RX ADMIN — FOLIC ACID 1 MG: 1 TABLET ORAL at 13:02

## 2024-05-11 RX ADMIN — LORAZEPAM 2 MG: 1 TABLET ORAL at 13:02

## 2024-05-11 RX ADMIN — SODIUM CHLORIDE, SODIUM GLUCONATE, SODIUM ACETATE, POTASSIUM CHLORIDE, MAGNESIUM CHLORIDE, SODIUM PHOSPHATE, DIBASIC, AND POTASSIUM PHOSPHATE 150 ML/HR: .53; .5; .37; .037; .03; .012; .00082 INJECTION, SOLUTION INTRAVENOUS at 13:03

## 2024-05-11 RX ADMIN — Medication 1 TABLET: at 13:02

## 2024-05-11 RX ADMIN — SODIUM CHLORIDE 1000 ML: 0.9 INJECTION, SOLUTION INTRAVENOUS at 11:23

## 2024-05-11 RX ADMIN — HEPARIN SODIUM 5000 UNITS: 5000 INJECTION INTRAVENOUS; SUBCUTANEOUS at 22:28

## 2024-05-11 RX ADMIN — SODIUM CHLORIDE 1000 ML: 0.9 INJECTION, SOLUTION INTRAVENOUS at 10:50

## 2024-05-11 RX ADMIN — PANTOPRAZOLE SODIUM 40 MG: 40 INJECTION, POWDER, FOR SOLUTION INTRAVENOUS at 10:54

## 2024-05-11 NOTE — ASSESSMENT & PLAN NOTE
Noted with elevated AST and ALT with AST > ALT and elevated bili.  Suspect alcohol induced  Alcohol cessation  Monitor CMP

## 2024-05-11 NOTE — ASSESSMENT & PLAN NOTE
Baseline creatinine 0.9 - presented with creatinine 2.3  Dry on exam  Likely prerenal MONAE due to volume depletion    Start Isolyte 150cc/h  Hold lisinopril  Avoid nephrotoxins  Monitor BMP

## 2024-05-11 NOTE — ASSESSMENT & PLAN NOTE
Daily drinker  Strongly encouraged cessation, patient is motivated now that he has a grandchild  CIWA protocol   Thiamine supplementation

## 2024-05-11 NOTE — PLAN OF CARE
Problem: PAIN - ADULT  Goal: Verbalizes/displays adequate comfort level or baseline comfort level  Description: Interventions:  - Encourage patient to monitor pain and request assistance  - Assess pain using appropriate pain scale  - Administer analgesics based on type and severity of pain and evaluate response  - Implement non-pharmacological measures as appropriate and evaluate response  - Consider cultural and social influences on pain and pain management  - Notify physician/advanced practitioner if interventions unsuccessful or patient reports new pain  Outcome: Progressing     Problem: INFECTION - ADULT  Goal: Absence or prevention of progression during hospitalization  Description: INTERVENTIONS:  - Assess and monitor for signs and symptoms of infection  - Monitor lab/diagnostic results  - Monitor all insertion sites, i.e. indwelling lines, tubes, and drains  - Monitor endotracheal if appropriate and nasal secretions for changes in amount and color  - [place] appropriate cooling/warming therapies per order  - Administer medications as ordered  - Instruct and encourage patient and family to use good hand hygiene technique  - Identify and instruct in appropriate isolation precautions for identified infection/condition  Outcome: Progressing  Goal: Absence of fever/infection during neutropenic period  Description: INTERVENTIONS:  - Monitor WBC    Outcome: Progressing     Problem: SAFETY ADULT  Goal: Patient will remain free of falls  Description: INTERVENTIONS:  - Educate patient/family on patient safety including physical limitations  - Instruct patient to call for assistance with activity   - Consult OT/PT to assist with strengthening/mobility   - Keep Call bell within reach  - Keep bed low and locked with side rails adjusted as appropriate  - Keep care items and personal belongings within reach  - Initiate and maintain comfort rounds  - Make Fall Risk Sign visible to staff  - Offer Toileting every 3 Hours,  in advance of need  - Initiate/Maintain bed alarm  - Obtain necessary fall risk management equipment:   - Apply yellow socks and bracelet for high fall risk patients  - Consider moving patient to room near nurses station  Outcome: Progressing  Goal: Maintain or return to baseline ADL function  Description: INTERVENTIONS:  -  Assess patient's ability to carry out ADLs; assess patient's baseline for ADL function and identify physical deficits which impact ability to perform ADLs (bathing, care of mouth/teeth, toileting, grooming, dressing, etc.)  - Assess/evaluate cause of self-care deficits   - Assess range of motion  - Assess patient's mobility; develop plan if impaired  - Assess patient's need for assistive devices and provide as appropriate  - Encourage maximum independence but intervene and supervise when necessary  - Involve family in performance of ADLs  - Assess for home care needs following discharge   - Consider OT consult to assist with ADL evaluation and planning for discharge  - Provide patient education as appropriate  Outcome: Progressing  Goal: Maintains/Returns to pre admission functional level  Description: INTERVENTIONS:  - Perform AM-PAC 6 Click Basic Mobility/ Daily Activity assessment daily.  - Set and communicate daily mobility goal to care team and patient/family/caregiver.   - Collaborate with rehabilitation services on mobility goals if consulted  - Perform Range of Motion 3 times a day.  - Reposition patient every 3 hours.  - Dangle patient 3 times a day  - Stand patient 3 times a day  - Ambulate patient 3 times a day  - Out of bed to chair 3 times a day   - Out of bed for meals 3 times a day  - Out of bed for toileting  - Record patient progress and toleration of activity level   Outcome: Progressing     Problem: DISCHARGE PLANNING  Goal: Discharge to home or other facility with appropriate resources  Description: INTERVENTIONS:  - Identify barriers to discharge w/patient and caregiver  -  Arrange for needed discharge resources and transportation as appropriate  - Identify discharge learning needs (meds, wound care, etc.)  - Arrange for interpretive services to assist at discharge as needed  - Refer to Case Management Department for coordinating discharge planning if the patient needs post-hospital services based on physician/advanced practitioner order or complex needs related to functional status, cognitive ability, or social support system  Outcome: Progressing     Problem: Knowledge Deficit  Goal: Patient/family/caregiver demonstrates understanding of disease process, treatment plan, medications, and discharge instructions  Description: Complete learning assessment and assess knowledge base.  Interventions:  - Provide teaching at level of understanding  - Provide teaching via preferred learning methods  Outcome: Progressing

## 2024-05-11 NOTE — ASSESSMENT & PLAN NOTE
Does mention epigastric and right upper quadrant abdominal pain.  Also mentions nausea with nonbilious vomiting lipase mildly elevated, AST and ALT elevated  CT does not reveal any pancreatitis but does reveal hepatomegaly    Suspect dyspeptic symptoms related to severe hx of alcohol consumption and also alcoholic steatohepatitis as the main source of symptoms  Supportive care  Advance diet as tolerated  Monitor abdominal exam  Alcohol cessation

## 2024-05-11 NOTE — H&P
[facility]  H&P  Name: Vladimir Delvalle 58 y.o. male I MRN: [id number]  Unit/Bed#: -02 I Date of Admission: 5/11/2024   Date of Service: 5/11/2024 I Hospital Day: 0      Assessment/Plan   * Abdominal pain  Assessment & Plan  Does mention epigastric and right upper quadrant abdominal pain.  Also mentions nausea with nonbilious vomiting lipase mildly elevated, AST and ALT elevated  CT does not reveal any pancreatitis but does reveal hepatomegaly    Suspect dyspeptic symptoms related to severe hx of alcohol consumption and also alcoholic steatohepatitis as the main source of symptoms  Supportive care  Advance diet as tolerated  Monitor abdominal exam  Alcohol cessation    Alcohol use disorder  Assessment & Plan  Daily drinker  Strongly encouraged cessation, patient is motivated now that he has a grandchild  CIWA protocol   Thiamine supplementation      Transaminitis  Assessment & Plan  Noted with elevated AST and ALT with AST > ALT and elevated bili.  Suspect alcohol induced  Alcohol cessation  Monitor CMP    Acute kidney failure (HCC)  Assessment & Plan  Baseline creatinine 0.9 - presented with creatinine 2.3  Dry on exam  Likely prerenal MONAE due to volume depletion    Start Isolyte 150cc/h  Hold lisinopril  Avoid nephrotoxins  Monitor BMP    Generalized weakness  Assessment & Plan  Does mention generalized weakness.   In the setting of chronic alcoholism, lack of proper p.o. intake, likely vitamin deficiency, electrolyte abnormalities including hyponatremia, hypokalemia  Replace potassium  Thiamine supplementation, nutritional support    Essential hypertension  Assessment & Plan  PTA on lisinopril  HOLD given MONAE  Monitor BP           VTE Prophylaxis: Heparin  / sequential compression device   Code Status: Full code  POLST: POLST form is not discussed and not completed at this time.  Discussion with family: Patient himself he did not request me to talk to anyone    Anticipated Length of  Stay:  Patient will be admitted on an Observation basis with an anticipated length of stay of less than 2 midnights.   Justification for Hospital Stay: Electrolyte abnormalities    Total Time for Visit, including Counseling / Coordination of Care: 90 minutes.  Greater than 50% of this total time spent on direct patient counseling and coordination of care.    Chief Complaint:   Fatigue, nausea vomiting    History of Present Illness:    Vladimir Delvalle is a 58 y.o. male who presents with nausea vomiting for 3 days as well as fatigue.  The patient does have a history of alcohol use disorder drinking almost on a daily basis for the past several years.  The patient states that he is not eating well.  For the past 3 days has been having epigastric discomfort with nausea and vomiting, nonbloody.  Also mentions significant lack of energy.  At the ED he was noted to have electrolyte abnormalities including hyponatremia sodium 133, hypokalemia potassium 3.3, elevated creatinine of 2.3, elevated AST of 160, mildly elevated lipase, CT does not reveal any pancreatitis but does reveal hepatomegaly      Review of Systems:    Review of Systems   Constitutional:  Positive for fatigue.   Gastrointestinal:  Positive for nausea and vomiting.   All other systems reviewed and are negative.      Past Medical and Surgical History:     Past Medical History:   Diagnosis Date    Hypertension     Pneumonia        Past Surgical History:   Procedure Laterality Date    FINGER SURGERY Left     index    KNEE ARTHROSCOPY Left     LUNG SURGERY Left        Meds/Allergies:    Prior to Admission medications    Medication Sig Start Date End Date Taking? Authorizing Provider   albuterol (Proventil HFA) 90 mcg/act inhaler Inhale 2 puffs every 6 (six) hours as needed for wheezing 2/8/24  Yes BEL Chong   aspirin 81 mg chewable tablet Chew 81 mg daily   Yes Historical Provider, MD   lisinopril (ZESTRIL) 20 mg tablet Take 1 tablet (20 mg total) by  mouth daily 2/8/24  Yes BEL Chong   PARoxetine (PAXIL) 10 mg tablet Take 1 tablet (10 mg total) by mouth daily 7/18/23  Yes BEL Barrett   promethazine-dextromethorphan (PHENERGAN-DM) 6.25-15 mg/5 mL oral syrup Take 5 mL by mouth 4 (four) times a day as needed for cough  Patient not taking: Reported on 5/11/2024 2/8/24   BEL Chong   predniSONE 20 mg tablet Take 1 tablet (20 mg total) by mouth 2 (two) times a day with meals 2/8/24 5/11/24  BEL Chong     I have reviewed home medications with patient personally.    Allergies: No Known Allergies    Social History:     Marital Status: Single     Substance Use History:   Social History     Substance and Sexual Activity   Alcohol Use Yes    Alcohol/week: 7.0 standard drinks of alcohol    Types: 7 Standard drinks or equivalent per week     Social History     Tobacco Use   Smoking Status Former    Current packs/day: 0.50    Average packs/day: 0.5 packs/day for 5.0 years (2.5 ttl pk-yrs)    Types: Cigarettes   Smokeless Tobacco Never     Social History     Substance and Sexual Activity   Drug Use Never       Family History:    non-contributory    Physical Exam:     Vitals:   Blood Pressure: 107/67 (05/11/24 1448)  Pulse: 83 (05/11/24 1448)  Temperature: 98.2 °F (36.8 °C) (05/11/24 1448)  Temp Source: Temporal (05/11/24 1020)  Respirations: 16 (05/11/24 1448)  SpO2: 92 % (05/11/24 1448)    Physical Exam  Vitals and nursing note reviewed.   Constitutional:       Appearance: Normal appearance.   HENT:      Head: Normocephalic and atraumatic.      Right Ear: External ear normal.      Left Ear: External ear normal.      Nose: Nose normal. No congestion or rhinorrhea.      Mouth/Throat:      Mouth: Mucous membranes are dry.      Pharynx: Oropharynx is clear. No oropharyngeal exudate or posterior oropharyngeal erythema.   Eyes:      General: No scleral icterus.        Right eye: No discharge.         Left eye: No discharge.      Pupils: Pupils are  equal, round, and reactive to light.   Neck:      Vascular: No carotid bruit.   Cardiovascular:      Rate and Rhythm: Normal rate and regular rhythm.      Pulses: Normal pulses.      Heart sounds: No murmur heard.     No friction rub. No gallop.   Pulmonary:      Effort: Pulmonary effort is normal. No respiratory distress.      Breath sounds: Normal breath sounds. No stridor. No wheezing, rhonchi or rales.   Abdominal:      General: Abdomen is flat. Bowel sounds are normal. There is no distension.      Palpations: Abdomen is soft. There is no mass.      Tenderness: There is no abdominal tenderness. There is no guarding or rebound.      Hernia: No hernia is present.   Musculoskeletal:         General: No swelling, tenderness, deformity or signs of injury. Normal range of motion.      Cervical back: Normal range of motion. No rigidity. No muscular tenderness.   Lymphadenopathy:      Cervical: No cervical adenopathy.   Skin:     General: Skin is warm and dry.      Capillary Refill: Capillary refill takes less than 2 seconds.      Coloration: Skin is not jaundiced or pale.      Findings: No bruising or erythema.   Neurological:      General: No focal deficit present.      Mental Status: He is alert and oriented to person, place, and time. Mental status is at baseline.      Cranial Nerves: No cranial nerve deficit.      Sensory: No sensory deficit.      Motor: No weakness.      Coordination: Coordination normal.      Deep Tendon Reflexes: Reflexes normal.   Psychiatric:         Mood and Affect: Mood normal.         Behavior: Behavior normal.         Thought Content: Thought content normal.         Judgment: Judgment normal.             Additional Data:     Lab Results: I have personally reviewed pertinent reports.      Results from last 7 days   Lab Units 05/11/24  1052   WBC Thousand/uL 7.69   HEMOGLOBIN g/dL 13.9   HEMATOCRIT % 39.6   PLATELETS Thousands/uL 102*   SEGS PCT % 68   LYMPHO PCT % 17   MONO PCT % 12   EOS  PCT % 1     Results from last 7 days   Lab Units 05/11/24  1052   SODIUM mmol/L 133*   POTASSIUM mmol/L 3.3*   CHLORIDE mmol/L 88*   CO2 mmol/L 25   BUN mg/dL 38*   CREATININE mg/dL 2.33*   ANION GAP mmol/L 20*   CALCIUM mg/dL 9.1   ALBUMIN g/dL 4.1   TOTAL BILIRUBIN mg/dL 1.77*   ALK PHOS U/L 86   ALT U/L 74*   AST U/L 163*   GLUCOSE RANDOM mg/dL 77                       Imaging: I have personally reviewed pertinent reports.      CT abdomen pelvis wo contrast   Final Result by Mukesh Malone MD (05/11 1315)      1. No acute findings in the abdomen or pelvis. Normal-appearing pancreas.      2. Hepatomegaly and steatosis.      Resident: [facility]      I, the attending radiologist, have reviewed the images and agree with the final report above.      Workstation performed: [id number]               [employer] / [facility] Records Reviewed: Yes     ** Please Note: This note has been constructed using a voice recognition system. **

## 2024-05-11 NOTE — ED PROVIDER NOTES
History  Chief Complaint   Patient presents with    Vomiting     Pt c/o n/v/d x 3 days with abd cramping/spasms     Vladimir Delvalle is a 58 y.o.  year old male  Past Medical History:  No date: Hypertension  No date: Pneumonia  Social History    Tobacco Use      Smoking status: Former        Packs/day: 0.50        Years: 0.5 packs/day for 5.0 years (2.5 ttl pk-yrs)        Types: Cigarettes      Smokeless tobacco: Never    Vaping Use      Vaping status: Never Used    Alcohol use: Never    Drug use: Never    Patient presents with:  Vomiting: Pt c/o n/v/d x 3 days with abd cramping/spasms  N/v/d daily many times x 3 days  No fever no chills  Some abd pain/cramps (non localizing)  Pt does drink ETOH daily     History obtained directly from the PATIENT              History provided by:  Patient   used: No    Vomiting  Associated symptoms: abdominal pain and diarrhea    Associated symptoms: no arthralgias, no chills, no cough, no fever and no sore throat        Prior to Admission Medications   Prescriptions Last Dose Informant Patient Reported? Taking?   PARoxetine (PAXIL) 10 mg tablet 5/11/2024  No Yes   Sig: Take 1 tablet (10 mg total) by mouth daily   albuterol (Proventil HFA) 90 mcg/act inhaler Past Week  No Yes   Sig: Inhale 2 puffs every 6 (six) hours as needed for wheezing   aspirin 81 mg chewable tablet 5/11/2024 Self Yes Yes   Sig: Chew 81 mg daily   lisinopril (ZESTRIL) 20 mg tablet 5/11/2024  No Yes   Sig: Take 1 tablet (20 mg total) by mouth daily   promethazine-dextromethorphan (PHENERGAN-DM) 6.25-15 mg/5 mL oral syrup Not Taking  No No   Sig: Take 5 mL by mouth 4 (four) times a day as needed for cough   Patient not taking: Reported on 5/11/2024      Facility-Administered Medications: None       Past Medical History:   Diagnosis Date    Hypertension     Pneumonia        Past Surgical History:   Procedure Laterality Date    FINGER SURGERY Left     index    KNEE ARTHROSCOPY Left     LUNG  SURGERY Left        Family History   Problem Relation Age of Onset    Lymphoma Mother     Skin cancer Father     Pancreatic cancer Maternal Grandmother      I have reviewed and agree with the history as documented.    E-Cigarette/Vaping    E-Cigarette Use Never User      E-Cigarette/Vaping Substances    Nicotine No     THC No     CBD No     Flavoring No     Other No     Unknown No      Social History     Tobacco Use    Smoking status: Former     Current packs/day: 0.50     Average packs/day: 0.5 packs/day for 5.0 years (2.5 ttl pk-yrs)     Types: Cigarettes    Smokeless tobacco: Never   Vaping Use    Vaping status: Never Used   Substance Use Topics    Alcohol use: Yes     Alcohol/week: 7.0 standard drinks of alcohol     Types: 7 Standard drinks or equivalent per week    Drug use: Never       Review of Systems   Constitutional:  Positive for fatigue. Negative for chills and fever.   HENT:  Negative for ear pain and sore throat.    Eyes:  Negative for pain and visual disturbance.   Respiratory:  Negative for cough and shortness of breath.    Cardiovascular:  Negative for chest pain and palpitations.   Gastrointestinal:  Positive for abdominal pain, diarrhea, nausea and vomiting.   Genitourinary:  Negative for dysuria and hematuria.   Musculoskeletal:  Negative for arthralgias and back pain.   Skin:  Negative for color change and rash.   Neurological:  Positive for weakness. Negative for seizures and syncope.   All other systems reviewed and are negative.      Physical Exam  Physical Exam  Vitals and nursing note reviewed.   Constitutional:       General: He is not in acute distress.     Appearance: Normal appearance. He is well-developed and normal weight.   HENT:      Head: Normocephalic and atraumatic.      Right Ear: External ear normal.      Left Ear: External ear normal.      Nose: Nose normal.   Eyes:      Conjunctiva/sclera: Conjunctivae normal.      Pupils: Pupils are equal, round, and reactive to light.    Cardiovascular:      Rate and Rhythm: Normal rate and regular rhythm.      Pulses: Normal pulses.      Heart sounds: Normal heart sounds. No murmur heard.  Pulmonary:      Effort: Pulmonary effort is normal. No respiratory distress.      Breath sounds: Normal breath sounds.   Abdominal:      Palpations: Abdomen is soft.      Tenderness: There is no abdominal tenderness.   Musculoskeletal:         General: No swelling.      Cervical back: Neck supple.   Skin:     General: Skin is warm and dry.      Capillary Refill: Capillary refill takes less than 2 seconds.   Neurological:      General: No focal deficit present.      Mental Status: He is alert and oriented to person, place, and time.   Psychiatric:         Mood and Affect: Mood normal.         Thought Content: Thought content normal.         Vital Signs  ED Triage Vitals   Temperature Pulse Respirations Blood Pressure SpO2   05/11/24 1020 05/11/24 1020 05/11/24 1020 05/11/24 1020 05/11/24 1020   98.1 °F (36.7 °C) 86 16 142/98 95 %      Temp Source Heart Rate Source Patient Position - Orthostatic VS BP Location FiO2 (%)   05/11/24 1020 05/11/24 1020 05/11/24 1020 05/11/24 1020 --   Temporal Monitor Sitting Left arm       Pain Score       05/11/24 1314       No Pain           Vitals:    05/11/24 1215 05/11/24 1252 05/11/24 1403 05/11/24 1448   BP: 116/74 117/77 117/72 107/67   Pulse: 83 72 86 83   Patient Position - Orthostatic VS:             Visual Acuity      ED Medications  Medications   PARoxetine (PAXIL) tablet 10 mg (10 mg Oral Not Given 5/11/24 1256)   aspirin chewable tablet 81 mg (81 mg Oral Not Given 5/11/24 1256)   albuterol (PROVENTIL HFA,VENTOLIN HFA) inhaler 2 puff (has no administration in time range)   thiamine tablet 100 mg (100 mg Oral Given 5/11/24 1302)   folic acid (FOLVITE) tablet 1 mg (1 mg Oral Given 5/11/24 1302)   multivitamin-minerals (CENTRUM) tablet 1 tablet (1 tablet Oral Given 5/11/24 1302)   heparin (porcine) subcutaneous injection  5,000 Units (5,000 Units Subcutaneous Given 5/11/24 1302)   multi-electrolyte (PLASMALYTE-A/ISOLYTE-S PH 7.4) IV solution (150 mL/hr Intravenous New Bag 5/11/24 1303)   ondansetron (ZOFRAN) injection 4 mg (has no administration in time range)   sodium chloride 0.9 % bolus 1,000 mL (0 mL Intravenous Stopped 5/11/24 1156)   ondansetron (ZOFRAN) injection 4 mg (4 mg Intravenous Given 5/11/24 1050)   pantoprazole (PROTONIX) injection 40 mg (40 mg Intravenous Given 5/11/24 1054)   aluminum-magnesium hydroxide-simethicone (MAALOX) oral suspension 30 mL (30 mL Oral Given 5/11/24 1054)   sodium chloride 0.9 % bolus 1,000 mL (1,000 mL Intravenous New Bag 5/11/24 1123)   LORazepam (ATIVAN) tablet 2 mg (2 mg Oral Given 5/11/24 1302)       Diagnostic Studies  Results Reviewed       Procedure Component Value Units Date/Time    CBC and differential [[id number]]  (Abnormal) Collected: 05/11/24 1052    Lab Status: Final result Specimen: Blood from Arm, Right Updated: 05/11/24 1117     WBC 7.69 Thousand/uL      RBC 4.12 Million/uL      Hemoglobin 13.9 g/dL      Hematocrit 39.6 %      MCV 96 fL      MCH 33.7 pg      MCHC 35.1 g/dL      RDW 12.6 %      MPV 11.2 fL      Platelets 102 Thousands/uL      nRBC 0 /100 WBCs      Segmented % 68 %      Immature Grans % 1 %      Lymphocytes % 17 %      Monocytes % 12 %      Eosinophils Relative 1 %      Basophils Relative 1 %      Absolute Neutrophils 5.20 Thousands/µL      Absolute Immature Grans 0.07 Thousand/uL      Absolute Lymphocytes 1.32 Thousands/µL      Absolute Monocytes 0.95 Thousand/µL      Eosinophils Absolute 0.11 Thousand/µL      Basophils Absolute 0.04 Thousands/µL     Comprehensive metabolic panel [[id number]]  (Abnormal) Collected: 05/11/24 1052    Lab Status: Final result Specimen: Blood from Arm, Right Updated: 05/11/24 1115     Sodium 133 mmol/L      Potassium 3.3 mmol/L      Chloride 88 mmol/L      CO2 25 mmol/L      ANION GAP 20 mmol/L      BUN 38 mg/dL      Creatinine 2.33  mg/dL      Glucose 77 mg/dL      Calcium 9.1 mg/dL       U/L      ALT 74 U/L      Alkaline Phosphatase 86 U/L      Total Protein 7.4 g/dL      Albumin 4.1 g/dL      Total Bilirubin 1.77 mg/dL      eGFR 29 ml/min/1.73sq m     Narrative:      [employer] Kid[employer] guidelines for Chronic Kidney Disease (CKD):     Stage 1 with normal or high GFR (GFR > 90 mL/min/1.73 square meters)    Stage 2 Mild CKD (GFR = 60-89 mL/min/1.73 square meters)    Stage 3A Moderate CKD (GFR = 45-59 mL/min/1.73 square meters)    Stage 3B Moderate CKD (GFR = 30-44 mL/min/1.73 square meters)    Stage 4 Severe CKD (GFR = 15-29 mL/min/1.73 square meters)    Stage 5 End Stage CKD (GFR <15 mL/min/1.73 square meters)  Note: GFR calculation is accurate only with a steady state creatinine    Lipase [[id number]]  (Abnormal) Collected: 05/11/24 1052    Lab Status: Final result Specimen: Blood from Arm, Right Updated: 05/11/24 1115     Lipase 95 u/L     Ethanol [[id number]]  (Abnormal) Collected: 05/11/24 1052    Lab Status: Final result Specimen: Blood from Arm, Right Updated: 05/11/24 1114     Ethanol Lvl 73 mg/dL                    CT abdomen pelvis wo contrast   Final Result by Mukesh Malone MD (05/11 1315)      1. No acute findings in the abdomen or pelvis. Normal-appearing pancreas.      2. Hepatomegaly and steatosis.      Resident: [facility]ILL[facility] I, the attending radiologist, have reviewed the images and agree with the final report above.      Workstation performed: [id number]                    Procedures  Procedures         ED Course  ED Course as of 05/11/24 1506   Sat May 11, 2024   1121 Acute MONAE BUN/Cr elevation is acute                                              Medical Decision Making  This patient presents emergency department with symptoms of nausea vomiting and diarrhea.  Differential diagnose includes but not limited to: Food poisoning, viral gastroenteritis, bacterial gastroenteritis, gastric varices,  pancreatitis, hepatitis, ileus versus SBO.    Due to the presentation patient will have blood work, and IV started, medications for nausea and vomiting, and IV fluids.    I have ordered CBC.  This was done to assess for leukocytosis versus leukopenia as possible indicators of infection viral versus bacterial.  Also included hemoglobin and hematocrit for assess for anemia.  And assess platelet numbers.  Thrombocytosis as a marker of inflammation and of course thrombocytopenia assess result of coagulation disorder or DIC.    Patient has presented to the Emergency Department with exacerbation of chronic condition / pt with new illness-injury that may poses a threat to life or body function.  At least 3 different tests have been ordered on this patient AND reviewed the results.   Old laboratory data (of at least 2 tests) were reviewed from the medical records and compared to today's results  Discussion with patient and/or family members of results (normal and abnormal) and the implications for immediate and long term treatment/management.  Due to the high risk of morbidity further diagnostic testing and treatment is necessary.    11:33 AM  I discussed the case with the hospitalist. We reviewed the HPI, pertinent PMH, ED course and management.   Hospitalist agreed with plan and will admit the patient to the hospital.    Medications  sodium chloride 0.9 % bolus 1,000 mL (1,000 mL Intravenous New Bag 5/11/24 1050)  sodium chloride 0.9 % bolus 1,000 mL (1,000 mL Intravenous New Bag 5/11/24 1123)  ondansetron (ZOFRAN) injection 4 mg (4 mg Intravenous Given 5/11/24 1050)  pantoprazole (PROTONIX) injection 40 mg (40 mg Intravenous Given 5/11/24 1054)  aluminum-magnesium hydroxide-simethicone (MAALOX) oral suspension 30 mL (30 mL Oral Given 5/11/24 1054)            Metabolic panel to assess for electrolyte deficiency, assess kidney function, and blood sugar levels to assess for hypoglycemia versus hyperglycemia as possible  causes of the patient's symptoms.    Liver function test -to assess for liver inflammation with a be viral, obstructive, or reactive to underlying infection/shock liver    Lipase -screen for elevated level as a sign of pancreatitis      Problems Addressed:  MONAE (acute kidney injury) (HCC): acute illness or injury  Alcoholism (HCC): chronic illness or injury with exacerbation, progression, or side effects of treatment  Vomiting and diarrhea: acute illness or injury    Amount and/or Complexity of Data Reviewed  Labs: ordered.  Radiology: ordered.  Discussion of management or test interpretation with external provider(s): Patient became transiently hypotensive: will re-bolus with fluids, patient with MONAE.     Risk  OTC drugs.  Prescription drug management.  Decision regarding hospitalization.             Disposition  Final diagnoses:   Vomiting and diarrhea   MONAE (acute kidney injury) (HCC)   Alcoholism (HCC)     Time reflects when diagnosis was documented in both MDM as applicable and the Disposition within this note       Time User Action Codes Description Comment    5/11/2024 11:53 AM Freddie Maldonado [R11.10,  R19.7] Vomiting and diarrhea     5/11/2024 11:53 AM Freddie Maldonado [N17.9] MONAE (acute kidney injury) (HCC)     5/11/2024 11:53 AM Freddie Maldonado [F10.20] Alcoholism (HCC)           ED Disposition       ED Disposition   Admit    Condition   Stable    Date/Time   Sat May 11, 2024 11:53 AM    Comment   Case was discussed with Hospitalist and the patient's admission status was agreed to be Admission Status: observation status to the service of Dr. Sommers  .               Follow-up Information    None         Current Discharge Medication List        CONTINUE these medications which have NOT CHANGED    Details   albuterol (Proventil HFA) 90 mcg/act inhaler Inhale 2 puffs every 6 (six) hours as needed for wheezing  Qty: 6.7 g, Refills: 5    Comments: Substitution to a formulary equivalent within the same  pharmaceutical class is authorized.  Associated Diagnoses: Upper respiratory tract infection, unspecified type      aspirin 81 mg chewable tablet Chew 81 mg daily      lisinopril (ZESTRIL) 20 mg tablet Take 1 tablet (20 mg total) by mouth daily  Qty: 90 tablet, Refills: 1    Associated Diagnoses: Essential hypertension      PARoxetine (PAXIL) 10 mg tablet Take 1 tablet (10 mg total) by mouth daily  Qty: 90 tablet, Refills: 0    Associated Diagnoses: Anxiety      promethazine-dextromethorphan (PHENERGAN-DM) 6.25-15 mg/5 mL oral syrup Take 5 mL by mouth 4 (four) times a day as needed for cough  Qty: 180 mL, Refills: 0    Associated Diagnoses: Upper respiratory tract infection, unspecified type             No discharge procedures on file.    PDMP Review       None            ED Provider  Electronically Signed by             Freddie Maldonado MD  05/11/24 9035

## 2024-05-11 NOTE — ASSESSMENT & PLAN NOTE
Does mention generalized weakness.   In the setting of chronic alcoholism, lack of proper p.o. intake, likely vitamin deficiency, electrolyte abnormalities including hyponatremia, hypokalemia  Replace potassium  Thiamine supplementation, nutritional support

## 2024-05-11 NOTE — ED NOTES
RN noticed patient began exhibiting tremors and stating that he felt a bit more anxious with a headache and seeing things. RN completed a CIWA and notified Provider that Patient scored an 11 and asked if Provider wanted RN to administer medication. Provider is aware and in the process of admitting patient and orders will follow.     Nita Horton RN  05/11/24 8060

## 2024-05-12 LAB
ALBUMIN SERPL BCP-MCNC: 3.4 G/DL (ref 3.5–5)
ALP SERPL-CCNC: 70 U/L (ref 34–104)
ALT SERPL W P-5'-P-CCNC: 51 U/L (ref 7–52)
ANION GAP SERPL CALCULATED.3IONS-SCNC: 11 MMOL/L (ref 4–13)
AST SERPL W P-5'-P-CCNC: 96 U/L (ref 13–39)
BASOPHILS # BLD AUTO: 0.03 THOUSANDS/ÂΜL (ref 0–0.1)
BASOPHILS NFR BLD AUTO: 1 % (ref 0–1)
BILIRUB SERPL-MCNC: 1.38 MG/DL (ref 0.2–1)
BUN SERPL-MCNC: 20 MG/DL (ref 5–25)
CALCIUM ALBUM COR SERPL-MCNC: 8.7 MG/DL (ref 8.3–10.1)
CALCIUM SERPL-MCNC: 8.2 MG/DL (ref 8.4–10.2)
CHLORIDE SERPL-SCNC: 100 MMOL/L (ref 96–108)
CO2 SERPL-SCNC: 27 MMOL/L (ref 21–32)
CREAT SERPL-MCNC: 0.85 MG/DL (ref 0.6–1.3)
EOSINOPHIL # BLD AUTO: 0.09 THOUSAND/ÂΜL (ref 0–0.61)
EOSINOPHIL NFR BLD AUTO: 2 % (ref 0–6)
ERYTHROCYTE [DISTWIDTH] IN BLOOD BY AUTOMATED COUNT: 12.7 % (ref 11.6–15.1)
GFR SERPL CREATININE-BSD FRML MDRD: 96 ML/MIN/1.73SQ M
GLUCOSE P FAST SERPL-MCNC: 88 MG/DL (ref 65–99)
GLUCOSE SERPL-MCNC: 88 MG/DL (ref 65–140)
HCT VFR BLD AUTO: 33.5 % (ref 36.5–49.3)
HGB BLD-MCNC: 11.5 G/DL (ref 12–17)
IMM GRANULOCYTES # BLD AUTO: 0.02 THOUSAND/UL (ref 0–0.2)
IMM GRANULOCYTES NFR BLD AUTO: 1 % (ref 0–2)
LYMPHOCYTES # BLD AUTO: 1.1 THOUSANDS/ÂΜL (ref 0.6–4.47)
LYMPHOCYTES NFR BLD AUTO: 29 % (ref 14–44)
MAGNESIUM SERPL-MCNC: 2.1 MG/DL (ref 1.9–2.7)
MCH RBC QN AUTO: 33.1 PG (ref 26.8–34.3)
MCHC RBC AUTO-ENTMCNC: 34.3 G/DL (ref 31.4–37.4)
MCV RBC AUTO: 97 FL (ref 82–98)
MONOCYTES # BLD AUTO: 0.52 THOUSAND/ÂΜL (ref 0.17–1.22)
MONOCYTES NFR BLD AUTO: 14 % (ref 4–12)
NEUTROPHILS # BLD AUTO: 1.98 THOUSANDS/ÂΜL (ref 1.85–7.62)
NEUTS SEG NFR BLD AUTO: 53 % (ref 43–75)
NRBC BLD AUTO-RTO: 0 /100 WBCS
PHOSPHATE SERPL-MCNC: 2.7 MG/DL (ref 2.7–4.5)
PLATELET # BLD AUTO: 73 THOUSANDS/UL (ref 149–390)
PMV BLD AUTO: 11.6 FL (ref 8.9–12.7)
POTASSIUM SERPL-SCNC: 3.4 MMOL/L (ref 3.5–5.3)
PROT SERPL-MCNC: 5.9 G/DL (ref 6.4–8.4)
RBC # BLD AUTO: 3.47 MILLION/UL (ref 3.88–5.62)
SODIUM SERPL-SCNC: 138 MMOL/L (ref 135–147)
WBC # BLD AUTO: 3.74 THOUSAND/UL (ref 4.31–10.16)

## 2024-05-12 PROCEDURE — 99232 SBSQ HOSP IP/OBS MODERATE 35: CPT

## 2024-05-12 PROCEDURE — 84100 ASSAY OF PHOSPHORUS: CPT | Performed by: INTERNAL MEDICINE

## 2024-05-12 PROCEDURE — 83735 ASSAY OF MAGNESIUM: CPT | Performed by: INTERNAL MEDICINE

## 2024-05-12 PROCEDURE — 85025 COMPLETE CBC W/AUTO DIFF WBC: CPT | Performed by: INTERNAL MEDICINE

## 2024-05-12 PROCEDURE — 80053 COMPREHEN METABOLIC PANEL: CPT | Performed by: INTERNAL MEDICINE

## 2024-05-12 RX ADMIN — HEPARIN SODIUM 5000 UNITS: 5000 INJECTION INTRAVENOUS; SUBCUTANEOUS at 13:25

## 2024-05-12 RX ADMIN — ASPIRIN 81 MG: 81 TABLET, CHEWABLE ORAL at 08:44

## 2024-05-12 RX ADMIN — SODIUM CHLORIDE, SODIUM GLUCONATE, SODIUM ACETATE, POTASSIUM CHLORIDE, MAGNESIUM CHLORIDE, SODIUM PHOSPHATE, DIBASIC, AND POTASSIUM PHOSPHATE 150 ML/HR: .53; .5; .37; .037; .03; .012; .00082 INJECTION, SOLUTION INTRAVENOUS at 05:31

## 2024-05-12 RX ADMIN — THIAMINE HCL TAB 100 MG 100 MG: 100 TAB at 08:44

## 2024-05-12 RX ADMIN — FOLIC ACID 1 MG: 1 TABLET ORAL at 08:44

## 2024-05-12 RX ADMIN — PAROXETINE HYDROCHLORIDE 10 MG: 20 TABLET, FILM COATED ORAL at 08:44

## 2024-05-12 RX ADMIN — Medication 1 TABLET: at 08:44

## 2024-05-12 RX ADMIN — HEPARIN SODIUM 5000 UNITS: 5000 INJECTION INTRAVENOUS; SUBCUTANEOUS at 22:57

## 2024-05-12 RX ADMIN — HEPARIN SODIUM 5000 UNITS: 5000 INJECTION INTRAVENOUS; SUBCUTANEOUS at 05:31

## 2024-05-12 NOTE — ASSESSMENT & PLAN NOTE
Baseline creatinine 0.9 - presented with creatinine 2.3, improved back to baseline of 0.85  Dry on exam  Likely prerenal MONAE due to volume depletion    Will discontinue IVF  Initially held lisinopril, will continue given normotensive  Avoid nephrotoxins  Monitor BMP

## 2024-05-12 NOTE — PLAN OF CARE
Problem: PAIN - ADULT  Goal: Verbalizes/displays adequate comfort level or baseline comfort level  Description: Interventions:  - Encourage patient to monitor pain and request assistance  - Assess pain using appropriate pain scale  - Administer analgesics based on type and severity of pain and evaluate response  - Implement non-pharmacological measures as appropriate and evaluate response  - Consider cultural and social influences on pain and pain management  - Notify physician/advanced practitioner if interventions unsuccessful or patient reports new pain  Outcome: Progressing     Problem: INFECTION - ADULT  Goal: Absence or prevention of progression during hospitalization  Description: INTERVENTIONS:  - Assess and monitor for signs and symptoms of infection  - Monitor lab/diagnostic results  - Monitor all insertion sites, i.e. indwelling lines, tubes, and drains  - Monitor endotracheal if appropriate and nasal secretions for changes in amount and color  - [place] appropriate cooling/warming therapies per order  - Administer medications as ordered  - Instruct and encourage patient and family to use good hand hygiene technique  - Identify and instruct in appropriate isolation precautions for identified infection/condition  Outcome: Progressing  Goal: Absence of fever/infection during neutropenic period  Description: INTERVENTIONS:  - Monitor WBC    Outcome: Progressing     Problem: SAFETY ADULT  Goal: Patient will remain free of falls  Description: INTERVENTIONS:  - Educate patient/family on patient safety including physical limitations  - Instruct patient to call for assistance with activity   - Consult OT/PT to assist with strengthening/mobility   - Keep Call bell within reach  - Keep bed low and locked with side rails adjusted as appropriate  - Keep care items and personal belongings within reach  - Initiate and maintain comfort rounds  - Make Fall Risk Sign visible to staff  - Offer Toileting every 2 Hours,  in advance of need  - Initiate/Maintain alarm  - Obtain necessary fall risk management equipment:   - Apply yellow socks and bracelet for high fall risk patients  - Consider moving patient to room near nurses station  Outcome: Progressing  Goal: Maintain or return to baseline ADL function  Description: INTERVENTIONS:  -  Assess patient's ability to carry out ADLs; assess patient's baseline for ADL function and identify physical deficits which impact ability to perform ADLs (bathing, care of mouth/teeth, toileting, grooming, dressing, etc.)  - Assess/evaluate cause of self-care deficits   - Assess range of motion  - Assess patient's mobility; develop plan if impaired  - Assess patient's need for assistive devices and provide as appropriate  - Encourage maximum independence but intervene and supervise when necessary  - Involve family in performance of ADLs  - Assess for home care needs following discharge   - Consider OT consult to assist with ADL evaluation and planning for discharge  - Provide patient education as appropriate  Outcome: Progressing  Goal: Maintains/Returns to pre admission functional level  Description: INTERVENTIONS:  - Perform AM-PAC 6 Click Basic Mobility/ Daily Activity assessment daily.  - Set and communicate daily mobility goal to care team and patient/family/caregiver.   - Collaborate with rehabilitation services on mobility goals if consulted  - Perform Range of Motion 3 times a day.  - Reposition patient every 2 hours.  - Dangle patient 3 times a day  - Stand patient 3 times a day  - Ambulate patient 3 times a day  - Out of bed to chair 3 times a day   - Out of bed for meals 3 times a day  - Out of bed for toileting  - Record patient progress and toleration of activity level   Outcome: Progressing     Problem: DISCHARGE PLANNING  Goal: Discharge to home or other facility with appropriate resources  Description: INTERVENTIONS:  - Identify barriers to discharge w/patient and caregiver  -  Arrange for needed discharge resources and transportation as appropriate  - Identify discharge learning needs (meds, wound care, etc.)  - Arrange for interpretive services to assist at discharge as needed  - Refer to Case Management Department for coordinating discharge planning if the patient needs post-hospital services based on physician/advanced practitioner order or complex needs related to functional status, cognitive ability, or social support system  Outcome: Progressing     Problem: Knowledge Deficit  Goal: Patient/family/caregiver demonstrates understanding of disease process, treatment plan, medications, and discharge instructions  Description: Complete learning assessment and assess knowledge base.  Interventions:  - Provide teaching at level of understanding  - Provide teaching via preferred learning methods  Outcome: Progressing

## 2024-05-12 NOTE — ASSESSMENT & PLAN NOTE
Daily drinker  Strongly encouraged cessation, patient is motivated now that he has a grandchild, offered recovery center and declined  [facility] protocol   Thiamine supplementation

## 2024-05-12 NOTE — UTILIZATION REVIEW
Initial Clinical Review    Admission: Date/Time/Statement:   Admission Orders (From admission, onward)       Ordered        05/11/24 1153  Place in Observation  Once                          Orders Placed This Encounter   Procedures    Place in Observation     Standing Status:   Standing     Number of Occurrences:   1     Order Specific Question:   Level of Care     Answer:   Med Surg [16]     ED Arrival Information       Expected   -    Arrival   5/11/2024 10:06    Acuity   Urgent              Means of arrival   Walk-In    Escorted by   Family Member    Service   Hospitalist    Admission type   Emergency              Arrival complaint   vomiting & nausea             Chief Complaint   Patient presents with    Vomiting     Pt c/o n/v/d x 3 days with abd cramping/spasms       Initial Presentation: 58 y.o. male who presents to the ED from home with epigastric discomfort, nausea and vomiting for 3 days as well as fatigue. The patient does have a history of alcohol use disorder,  drinking almost on a daily basis for the past several years.  The patient states that he is not eating well.  ED work up revealed  electrolyte abnormalities including hyponatremia sodium 133, hypokalemia potassium 3.3, elevated creatinine of 2.3 (baseline 0.9), elevated AST of 160, mildly elevated lipase of 95.  CT does not reveal any pancreatitis but does show hepatomegaly. Exam:  AOx3. Mucous membranes are dry. No abdominal tenderness.     5/11 Admit to Observation for evaluation and treatment of abdominal pain, alcohol use disorder, transaminitis, MONAE, generalized weakness:  Advance diet as tolerated, CIWA protocol, thiamine supplementation. Monitor CMP.  IVF, hold lisinopril.     5/12 Internal Medicine: Creatinine improved back to baseline of 0.85. D/C IVF. Initially held lisinopril, will continue given normotensive.  AST improved to 96, ALT 51, Alk Phos 70, bili 1.38.   Reports numerous falls at home, will have PT/OT evaluate. Offered  [facility]ly [facility].         ED Triage Vitals   Temperature Pulse Respirations Blood Pressure SpO2   05/11/24 1020 05/11/24 1020 05/11/24 1020 05/11/24 1020 05/11/24 1020   98.1 °F (36.7 °C) 86 16 142/98 95 % Room air      Temp Source Heart Rate Source Patient Position - Orthostatic VS BP Location FiO2 (%)   05/11/24 1020 05/11/24 1020 05/11/24 1020 05/11/24 1020 --   Temporal Monitor Sitting Left arm       Pain Score       05/11/24 1314       No Pain          Wt Readings from Last 1 Encounters:   05/12/24 81.6 kg (180 lb)     Additional Vital Signs:      Date/Time Temp Pulse Resp BP MAP (mmHg) SpO2   05/12/24 16:33:40 98.5 °F (36.9 °C) 76 17 156/98 117 97 %   05/12/24 10:46:24 -- 80 18 125/86 99 93 %   05/12/24 07:19:20 97.5 °F (36.4 °C) 79 18 143/98 113 95 %   05/12/24 0300 -- 80 -- 132/92 -- --   05/12/24 02:52:19 98.2 °F (36.8 °C) 80 16 132/92 105 94 %   05/11/24 2300 -- 71 -- 137/76 -- --   05/11/24 22:20:26 -- 71 -- 137/76 96 97 %   05/11/24 19:09:24 -- 95 -- 121/67 85 92 %   05/11/24 1900 -- 95 -- 121/67 -- --   05/11/24 18:42:03 -- 85 -- 106/66 79 89 % Abnormal    05/11/24 14:48:16 98.2 °F (36.8 °C) 83 16 107/67 80 92 %   05/11/24 14:03:46 -- 86 -- 117/72 87 92 %   05/11/24 12:52:37 98.3 °F (36.8 °C) 72 16 117/77 90 94 %   05/11/24 1215 -- 83 18 116/74 90 96 %   05/11/24 1200 -- 81 18 118/64 84 94 %   05/11/24 1147 -- 82 -- 112/73 -- --     CIWA    Row Name 05/12/24 16:33:40 05/12/24 10:46:24 05/12/24 07:19:20 05/12/24 0300 05/12/24 02:52:19   CIWA-Ar   /98  -/86  -/98  -/92  -/92  -DI   Pulse 76  -DI 80  -DI 79  -DI 80  -ED 80  -DI   Nausea and Vomiting 0  -MB 0  -MB 0  -MB 0  -ED --   Tactile Disturbances 0  -MB 0  -MB 0  -MB 0  -ED --   Tremor 1  -MB 1  -MB 1  -MB 1  -ED --   Auditory Disturbances 0  -MB 0  -MB 0  -MB 0  -ED --   Paroxysmal Sweats 0  -MB 0  -MB 0  -MB 0  -ED --   Visual Disturbances 0  -MB 0  -MB 0  -MB 0  -ED --   Anxiety 0  -MB 0  -MB 0  -MB 0  -ED --    Headache, Fullness in Head 0  -MB 0  -MB 0  -MB 0  -ED --   Agitation 0  -MB 0  -MB 0  -MB 0  -ED --   Orientation and Clouding of Sensorium 0  -MB 0  -MB 0  -MB 0  -ED --   CIWA-Ar Total 1  -MB 1  -MB 1  -MB 1  -ED --   Row Name 05/11/24 2300 05/11/24 22:20:26 05/11/24 19:09:24 05/11/24 1900 05/11/24 18:42:03   CIWA-Ar   /76  -/76  -/67  -/67  -/66  -DI   Pulse 71  -ED 71  -DI 95  -DI 95  -ED 85  -DI   Nausea and Vomiting 0  -ED -- -- 0  -ED 0  -MB   Tactile Disturbances 0  -ED -- -- 0  -ED 0  -MB   Tremor 1  -ED -- -- 1  -ED 2  -MB   Auditory Disturbances 0  -ED -- -- 0  -ED 0  -MB   Paroxysmal Sweats 0  -ED -- -- 0  -ED 2  -MB   Visual Disturbances 0  -ED -- -- 0  -ED 0  -MB   Anxiety 0  -ED -- -- 0  -ED 1  -MB   Headache, Fullness in Head 0  -ED -- -- 0  -ED 0  -MB   Agitation 0  -ED -- -- 0  -ED 0  -MB   Orientation and Clouding of Sensorium 0  -ED -- -- 0  -ED 0  -MB   CIWA-Ar Total 1  -ED -- -- 1  -ED 5  -MB   Row Name 05/11/24 14:48:16 05/11/24 14:03:46 05/11/24 12:52:37 05/11/24 1215 05/11/24 1200   CIWA-Ar   /67  -/72  -/77  -/74  -/64  -EN   Pulse 83  -DI 86  -DI 72  -DI 83  -EN 81  -EN   Nausea and Vomiting -- 0  -MB 1  -MB 1  -MB --   Tactile Disturbances -- 0  -MB 0  -MB 0  -MB --   Tremor -- 3  -MB 2  -MB 2  -MB --   Auditory Disturbances -- 0  -MB 0  -MB 0  -MB --   Paroxysmal Sweats -- 0  -MB 0  -MB 0  -MB --   Visual Disturbances -- 0  -MB 0  -MB 0  -MB --   Anxiety -- 2  -MB 3  -MB 3  -MB --   Headache, Fullness in Head -- 0  -MB 2  -MB 2  -MB --   Agitation -- 0  -MB 0  -MB 0  -MB --   Orientation and Clouding of Sensorium -- 0  -MB 0  -MB 0  -MB --   CIWA-Ar Total -- 5  -MB 8  -MB 8  -MB --         Pertinent Labs/Diagnostic Test Results:     CT abdomen pelvis wo contrast   Final Result by Mukesh Malone MD (05/11 7223)      1. No acute findings in the abdomen or pelvis. Normal-appearing pancreas.      2. Hepatomegaly and steatosis.       Resident: MERCY VILLANUEVA I, the attending radiologist, have reviewed the images and agree with the final report above.      Workstation performed: [id number]               Results from last 7 days   Lab Units 05/12/24  0520 05/11/24  1052   WBC Thousand/uL 3.74* 7.69   HEMOGLOBIN g/dL 11.5* 13.9   HEMATOCRIT % 33.5* 39.6   PLATELETS Thousands/uL 73* 102*   TOTAL NEUT ABS Thousands/µL 1.98 5.20         Results from last 7 days   Lab Units 05/12/24  0520 05/11/24  1052   SODIUM mmol/L 138 133*   POTASSIUM mmol/L 3.4* 3.3*   CHLORIDE mmol/L 100 88*   CO2 mmol/L 27 25   ANION GAP mmol/L 11 20*   BUN mg/dL 20 38*   CREATININE mg/dL 0.85 2.33*   EGFR ml/min/1.73sq m 96 29   CALCIUM mg/dL 8.2* 9.1   MAGNESIUM mg/dL 2.1  --    PHOSPHORUS mg/dL 2.7  --      Results from last 7 days   Lab Units 05/12/24  0520 05/11/24  1052   AST U/L 96* 163*   ALT U/L 51 74*   ALK PHOS U/L 70 86   TOTAL PROTEIN g/dL 5.9* 7.4   ALBUMIN g/dL 3.4* 4.1   TOTAL BILIRUBIN mg/dL 1.38* 1.77*         Results from last 7 days   Lab Units 05/12/24  0520 05/11/24  1052   GLUCOSE RANDOM mg/dL 88 77                   Results from last 7 days   Lab Units 05/11/24  1052   LIPASE u/L 95*                       Results from last 7 days   Lab Units 05/11/24  1052   ETHANOL LVL mg/dL 73*               ED Treatment:   Medication Administration from 05/11/2024 1006 to 05/11/2024 1245         Date/Time Order Dose Route Action     05/11/2024 1156 EDT sodium chloride 0.9 % bolus 1,000 mL 0 mL Intravenous Stopped     05/11/2024 1050 EDT sodium chloride 0.9 % bolus 1,000 mL 1,000 mL Intravenous New Bag     05/11/2024 1050 EDT ondansetron (ZOFRAN) injection 4 mg 4 mg Intravenous Given     05/11/2024 1054 EDT pantoprazole (PROTONIX) injection 40 mg 40 mg Intravenous Given     05/11/2024 1054 EDT aluminum-magnesium hydroxide-simethicone (MAALOX) oral suspension 30 mL 30 mL Oral Given     05/11/2024 1123 EDT sodium chloride 0.9 % bolus 1,000 mL 1,000 mL Intravenous  New Bag          Past Medical History:   Diagnosis Date    Hypertension     Pneumonia      Present on Admission:   Essential hypertension      Admitting Diagnosis: Alcoholism ([facility]) [F10.20]  MONAE (acute kidney injury) (HCC) [N17.9]  Vomiting and diarrhea [R11.10, R19.7]  Age/Sex: 58 y.o. male      Admission Orders: CIWA      Scheduled Medications:      aspirin, 81 mg, Oral, Daily  folic acid, 1 mg, Oral, Daily  heparin (porcine), 5,000 Units, Subcutaneous, Q8H MIGUEL  multivitamin-minerals, 1 tablet, Oral, Daily  PARoxetine, 10 mg, Oral, Daily  thiamine, 100 mg, Oral, Daily    LORazepam (ATIVAN) tablet 2 mg  Dose: 2 mg  Freq: Once Route: PO  Indications of Use: ALCOHOL WITHDRAWAL SYNDROME  Start: 05/11/24 1300 End: 05/11/24 1302     Continuous IV Infusions:    multi-electrolyte (PLASMALYTE-A/ISOLYTE-S PH 7.4) IV solution  Rate: 150 mL/hr Dose: 150 mL/hr  Freq: Continuous Route: IV  Last Dose: 150 mL/hr (05/12/24 0531)  Start: 05/11/24 1200 End: 05/12/24 1503         PRN Meds: None given.  albuterol, 2 puff, Inhalation, Q6H PRN  ondansetron, 4 mg, Intravenous, Q6H PRN            Network Utilization Review Department  ATTENTION: Please call with any questions or concerns to [phone] and carefully listen to the prompts so that you are directed to the right person. All voicemails are confidential.   For Discharge needs, contact Care Management DC Support Team at [phone] opt. 2  Send all requests for admission clinical reviews, approved or denied determinations and any other requests to dedicated fax number below belonging to the campus where the patient is receiving treatment. List of dedicated fax numbers for the Facilities:  FACILITY NAME UR FAX NUMBER   ADMISSION DENIALS (Administrative/Medical Necessity) [phone]   DISCHARGE SUPPORT TEAM (NETWORK) [phone]   PARENT CHILD HEALTH (Maternity/NICU/Pediatrics) [phone]   [facility] [phone]   [facility]  [place] [phone]   [facility] [phone]   [facility] [phone]   [facility] [phone]   [facility] [phone]   [facility] [phone]   [facility] [phone]   [facility] [phone]   [facility] [phone]   [facility] [phone]   [facility] [phone]

## 2024-05-12 NOTE — PROGRESS NOTES
[facility]  Progress Note  Name: Vladimir Delvalle I  MRN: [id number]  Unit/Bed#: -02 I Date of Admission: 5/11/2024   Date of Service: 5/12/2024 I Hospital Day: 0    Assessment/Plan   * Acute kidney failure (HCC)  Assessment & Plan  Baseline creatinine 0.9 - presented with creatinine 2.3, improved back to baseline of 0.85  Dry on exam  Likely prerenal MONAE due to volume depletion    Will discontinue IVF  Initially held lisinopril, will continue given normotensive  Avoid nephrotoxins  Monitor BMP    Generalized weakness  Assessment & Plan  Does mention generalized weakness.   In the setting of chronic alcoholism, lack of proper p.o. intake, likely vitamin deficiency, electrolyte abnormalities including hyponatremia, hypokalemia  Replace potassium  Thiamine supplementation, nutritional support  Additionally reports numerous falls while at home and sober, will have PT OT eval    Abdominal pain  Assessment & Plan  Does mention epigastric and right upper quadrant abdominal pain.  Also mentions nausea with nonbilious vomiting lipase mildly elevated, AST and ALT elevated  CT does not reveal any pancreatitis but does reveal hepatomegaly    Suspect dyspeptic symptoms related to severe hx of alcohol consumption and also alcoholic steatohepatitis as the main source of symptoms  Supportive care  Advance diet as tolerated  Monitor abdominal exam  Alcohol cessation  AST improved to 96, ALT 51, alk phos 70, bili 1.38    Transaminitis  Assessment & Plan  Noted with elevated AST and ALT with AST > ALT and elevated bili.  Suspect alcohol induced  Alcohol cessation  Monitor CMP    Alcohol use disorder  Assessment & Plan  Daily drinker  Strongly encouraged cessation, patient is motivated now that he has a grandchild, offered recovery center and declined  [facility] protocol   Thiamine supplementation      Essential hypertension  Assessment & Plan  PTA on lisinopril  Initially held given MONAE, will continue to hold  given normotensive behavior  Monitor BP           VTE Pharmacologic Prophylaxis: VTE Score: 1 Low Risk (Score 0-2) - Encourage Ambulation.    Mobility:   Basic Mobility Inpatient Raw Score: 24  JH-HLM Goal: 8: Walk 250 feet or more  JH-HLM Achieved: 7: Walk 25 feet or more  JH-HLM Goal achieved. Continue to encourage appropriate mobility.    Patient Centered Rounds: I performed bedside rounds with nursing staff today.   Discussions with Specialists or Other Care Team Provider: ALEXA    Education and Discussions with Family / Patient: Patient declined call to .     Total Time Spent on Date of Encounter in care of patient: 35 mins. This time was spent on one or more of the following: performing physical exam; counseling and coordination of care; obtaining or reviewing history; documenting in the medical record; reviewing/ordering tests, medications or procedures; communicating with other healthcare professionals and discussing with patient's family/caregivers.    Current Length of Stay: 0 day(s)  Current Patient Status: Observation   Certification Statement: The patient will continue to require additional inpatient hospital stay due to awaiting PT/OT eval and continued improvement of LFTs   Discharge Plan: Anticipate discharge tomorrow to home.    Code Status: Level 1 - Full Code    Subjective:   Patient reports feeling poorly with continued mild abdominal pain and significant nausea.  He currently denies any chest pain/pressure, palpitations, lightheadedness, or chills.    Objective:     Vitals:   Temp (24hrs), Av.9 °F (36.6 °C), Min:97.5 °F (36.4 °C), Max:98.2 °F (36.8 °C)    Temp:  [97.5 °F (36.4 °C)-98.2 °F (36.8 °C)] 97.5 °F (36.4 °C)  HR:  [71-95] 80  Resp:  [16-18] 18  BP: (106-143)/(66-98) 125/86  SpO2:  [89 %-97 %] 93 %  Body mass index is 24.41 kg/m².     Input and Output Summary (last 24 hours):     Intake/Output Summary (Last 24 hours) at 2024 1506  Last data filed at 2024  0252  Gross per 24 hour   Intake 500 ml   Output 2000 ml   Net -1500 ml       Physical Exam:   Physical Exam  Vitals and nursing note reviewed.   Constitutional:       Appearance: He is normal weight.   HENT:      Head: Normocephalic.      Nose: Nose normal.      Mouth/Throat:      Mouth: Mucous membranes are moist.      Pharynx: Oropharynx is clear.   Eyes:      General: No scleral icterus.     Conjunctiva/sclera: Conjunctivae normal.      Pupils: Pupils are equal, round, and reactive to light.   Cardiovascular:      Rate and Rhythm: Normal rate and regular rhythm.      Heart sounds: No murmur heard.     No friction rub. No gallop.   Pulmonary:      Effort: Pulmonary effort is normal. No respiratory distress.      Breath sounds: Normal breath sounds. No stridor. No wheezing, rhonchi or rales.   Abdominal:      General: Abdomen is flat.      Palpations: Abdomen is soft.   Musculoskeletal:         General: Normal range of motion.      Cervical back: Normal range of motion and neck supple.      Right lower leg: No edema.      Left lower leg: No edema.   Lymphadenopathy:      Cervical: No cervical adenopathy.   Skin:     General: Skin is warm.      Coloration: Skin is not jaundiced or pale.      Findings: No bruising, erythema or lesion.   Neurological:      General: No focal deficit present.      Mental Status: He is alert and oriented to person, place, and time. Mental status is at baseline.      Cranial Nerves: No cranial nerve deficit.      Motor: No weakness.   Psychiatric:         Mood and Affect: Mood normal.         Behavior: Behavior normal.         Thought Content: Thought content normal.          Additional Data:     Labs:  Results from last 7 days   Lab Units 05/12/24  0520   WBC Thousand/uL 3.74*   HEMOGLOBIN g/dL 11.5*   HEMATOCRIT % 33.5*   PLATELETS Thousands/uL 73*   SEGS PCT % 53   LYMPHO PCT % 29   MONO PCT % 14*   EOS PCT % 2     Results from last 7 days   Lab Units 05/12/24  0520   SODIUM mmol/L  138   POTASSIUM mmol/L 3.4*   CHLORIDE mmol/L 100   CO2 mmol/L 27   BUN mg/dL 20   CREATININE mg/dL 0.85   ANION GAP mmol/L 11   CALCIUM mg/dL 8.2*   ALBUMIN g/dL 3.4*   TOTAL BILIRUBIN mg/dL 1.38*   ALK PHOS U/L 70   ALT U/L 51   AST U/L 96*   GLUCOSE RANDOM mg/dL 88                       Lines/Drains:  Invasive Devices       Peripheral Intravenous Line  Duration             Peripheral IV 05/11/24 Distal;Right;Upper;Ventral (anterior) Arm 1 day                          Imaging: Reviewed radiology reports from this admission including: abdominal/pelvic CT    Recent Cultures (last 7 days):         Last 24 Hours Medication List:   Current Facility-Administered Medications   Medication Dose Route Frequency Provider Last Rate    albuterol  2 puff Inhalation Q6H PRN Patrick Looney MD      aspirin  81 mg Oral Daily Patrick Looney MD      folic acid  1 mg Oral Daily Patrick Looney MD      heparin (porcine)  5,000 Units Subcutaneous Q8H [facility] Patrick Looney MD      multivitamin-minerals  1 tablet Oral Daily Patrick Looney MD      ondansetron  4 mg Intravenous Q6H PRN Patrick Looney MD      PARoxetine  10 mg Oral Daily Patrick Looney MD      thiamine  100 mg Oral Daily Patrick Looney MD          Today, Patient Was Seen By: Holland Ferguson PA-C    **Please Note: This note may have been constructed using a voice recognition system.**

## 2024-05-12 NOTE — ASSESSMENT & PLAN NOTE
Does mention epigastric and right upper quadrant abdominal pain.  Also mentions nausea with nonbilious vomiting lipase mildly elevated, AST and ALT elevated  CT does not reveal any pancreatitis but does reveal hepatomegaly    Suspect dyspeptic symptoms related to severe hx of alcohol consumption and also alcoholic steatohepatitis as the main source of symptoms  Supportive care  Advance diet as tolerated  Monitor abdominal exam  Alcohol cessation  AST improved to 96, ALT 51, alk phos 70, bili 1.38

## 2024-05-12 NOTE — ASSESSMENT & PLAN NOTE
PTA on lisinopril  Initially held given MONAE, will continue to hold given normotensive behavior  Monitor BP

## 2024-05-12 NOTE — ASSESSMENT & PLAN NOTE
Does mention generalized weakness.   In the setting of chronic alcoholism, lack of proper p.o. intake, likely vitamin deficiency, electrolyte abnormalities including hyponatremia, hypokalemia  Replace potassium  Thiamine supplementation, nutritional support  Additionally reports numerous falls while at home and sober, will have PT OT eval

## 2024-05-13 ENCOUNTER — TRANSITIONAL CARE MANAGEMENT (OUTPATIENT)
Dept: FAMILY MEDICINE CLINIC | Facility: CLINIC | Age: 59
End: 2024-05-13

## 2024-05-13 VITALS
HEART RATE: 80 BPM | HEIGHT: 72 IN | TEMPERATURE: 97.4 F | SYSTOLIC BLOOD PRESSURE: 111 MMHG | WEIGHT: 180 LBS | RESPIRATION RATE: 14 BRPM | BODY MASS INDEX: 24.38 KG/M2 | DIASTOLIC BLOOD PRESSURE: 79 MMHG | OXYGEN SATURATION: 93 %

## 2024-05-13 LAB
ALBUMIN SERPL BCP-MCNC: 3.8 G/DL (ref 3.5–5)
ALP SERPL-CCNC: 74 U/L (ref 34–104)
ALT SERPL W P-5'-P-CCNC: 60 U/L (ref 7–52)
ANION GAP SERPL CALCULATED.3IONS-SCNC: 9 MMOL/L (ref 4–13)
AST SERPL W P-5'-P-CCNC: 118 U/L (ref 13–39)
BILIRUB SERPL-MCNC: 1.34 MG/DL (ref 0.2–1)
BUN SERPL-MCNC: 9 MG/DL (ref 5–25)
CALCIUM SERPL-MCNC: 9 MG/DL (ref 8.4–10.2)
CHLORIDE SERPL-SCNC: 98 MMOL/L (ref 96–108)
CO2 SERPL-SCNC: 29 MMOL/L (ref 21–32)
CREAT SERPL-MCNC: 0.61 MG/DL (ref 0.6–1.3)
ERYTHROCYTE [DISTWIDTH] IN BLOOD BY AUTOMATED COUNT: 12.2 % (ref 11.6–15.1)
GFR SERPL CREATININE-BSD FRML MDRD: 110 ML/MIN/1.73SQ M
GLUCOSE P FAST SERPL-MCNC: 85 MG/DL (ref 65–99)
GLUCOSE SERPL-MCNC: 85 MG/DL (ref 65–140)
HCT VFR BLD AUTO: 38.2 % (ref 36.5–49.3)
HGB BLD-MCNC: 13.1 G/DL (ref 12–17)
MCH RBC QN AUTO: 33 PG (ref 26.8–34.3)
MCHC RBC AUTO-ENTMCNC: 34.3 G/DL (ref 31.4–37.4)
MCV RBC AUTO: 96 FL (ref 82–98)
PLATELET # BLD AUTO: 89 THOUSANDS/UL (ref 149–390)
PMV BLD AUTO: 11.3 FL (ref 8.9–12.7)
POTASSIUM SERPL-SCNC: 3.3 MMOL/L (ref 3.5–5.3)
PROT SERPL-MCNC: 6.8 G/DL (ref 6.4–8.4)
RBC # BLD AUTO: 3.97 MILLION/UL (ref 3.88–5.62)
SODIUM SERPL-SCNC: 136 MMOL/L (ref 135–147)
WBC # BLD AUTO: 4.48 THOUSAND/UL (ref 4.31–10.16)

## 2024-05-13 PROCEDURE — 85027 COMPLETE CBC AUTOMATED: CPT

## 2024-05-13 PROCEDURE — 80053 COMPREHEN METABOLIC PANEL: CPT

## 2024-05-13 PROCEDURE — 99239 HOSP IP/OBS DSCHRG MGMT >30: CPT

## 2024-05-13 RX ORDER — LISINOPRIL 20 MG/1
20 TABLET ORAL DAILY
Status: DISCONTINUED | OUTPATIENT
Start: 2024-05-13 | End: 2024-05-13 | Stop reason: HOSPADM

## 2024-05-13 RX ADMIN — Medication 1 TABLET: at 08:59

## 2024-05-13 RX ADMIN — THIAMINE HCL TAB 100 MG 100 MG: 100 TAB at 08:59

## 2024-05-13 RX ADMIN — PAROXETINE HYDROCHLORIDE 10 MG: 20 TABLET, FILM COATED ORAL at 08:59

## 2024-05-13 RX ADMIN — LISINOPRIL 20 MG: 20 TABLET ORAL at 11:39

## 2024-05-13 RX ADMIN — HEPARIN SODIUM 5000 UNITS: 5000 INJECTION INTRAVENOUS; SUBCUTANEOUS at 06:29

## 2024-05-13 RX ADMIN — FOLIC ACID 1 MG: 1 TABLET ORAL at 08:59

## 2024-05-13 RX ADMIN — ASPIRIN 81 MG: 81 TABLET, CHEWABLE ORAL at 08:59

## 2024-05-13 NOTE — PHYSICAL THERAPY NOTE
PHYSICAL THERAPY SCREEN  NAME:  Vladimir Delvalle  DATE: 05/13/24    AGE:   58 y.o.  Mrn:   [id number]  ADMIT DX:  Alcoholism (HCC) [F10.20]  MONAE (acute kidney injury) (HCC) [N17.9]  Vomiting and diarrhea [R11.10, R19.7]  Problem List:   Patient Active Problem List   Diagnosis    Carpal tunnel syndrome of right wrist    Essential hypertension    Mixed hyperlipidemia    Anxiety    Alcohol use disorder    Transaminitis    Abdominal pain    Acute kidney failure (HCC)    Generalized weakness       Past Medical History  Past Medical History:   Diagnosis Date    Hypertension     Pneumonia        Past Surgical History  Past Surgical History:   Procedure Laterality Date    FINGER SURGERY Left     index    KNEE ARTHROSCOPY Left     LUNG SURGERY Left        PT orders received. Chart review performed; per chart review, pt with AMPAC score of 24 and RN stated patient has been ambulatory at independent level in the room without AD. Patient reports he currently has no concerns related to returning home and completing ADLs and functional mobility at this time. At this time, no acute PT needs identified and PT will complete orders. Case management and PA-C made aware of session       Tamanna Zimmerman, PT

## 2024-05-13 NOTE — DISCHARGE INSTR - AVS FIRST PAGE
Please continue with your journey to stopping alcohol.  Please follow-up with your primary care for continuity care within 1 week.

## 2024-05-13 NOTE — ASSESSMENT & PLAN NOTE
Does mention epigastric and right upper quadrant abdominal pain.  Also mentions nausea with nonbilious vomiting lipase mildly elevated, AST and ALT elevated  CT does not reveal any pancreatitis but does reveal hepatomegaly    Suspect dyspeptic symptoms related to severe hx of alcohol consumption and also alcoholic steatohepatitis as the main source of symptoms  Supportive care  Advance diet as tolerated  Monitor abdominal exam  Alcohol cessation  , ALT 60, alk phos 74, bili 1.34, likely your baseline given alcoholic steatohepatitis

## 2024-05-13 NOTE — PLAN OF CARE
Problem: PAIN - ADULT  Goal: Verbalizes/displays adequate comfort level or baseline comfort level  Description: Interventions:  - Encourage patient to monitor pain and request assistance  - Assess pain using appropriate pain scale  - Administer analgesics based on type and severity of pain and evaluate response  - Implement non-pharmacological measures as appropriate and evaluate response  - Consider cultural and social influences on pain and pain management  - Notify physician/advanced practitioner if interventions unsuccessful or patient reports new pain  Outcome: Progressing     Problem: INFECTION - ADULT  Goal: Absence or prevention of progression during hospitalization  Description: INTERVENTIONS:  - Assess and monitor for signs and symptoms of infection  - Monitor lab/diagnostic results  - Monitor all insertion sites, i.e. indwelling lines, tubes, and drains  - Monitor endotracheal if appropriate and nasal secretions for changes in amount and color  - [place] appropriate cooling/warming therapies per order  - Administer medications as ordered  - Instruct and encourage patient and family to use good hand hygiene technique  - Identify and instruct in appropriate isolation precautions for identified infection/condition  Outcome: Progressing  Goal: Absence of fever/infection during neutropenic period  Description: INTERVENTIONS:  - Monitor WBC    Outcome: Progressing

## 2024-05-13 NOTE — ASSESSMENT & PLAN NOTE
Daily drinker  Strongly encouraged cessation, patient is motivated now that he has a grandchild, offered recovery center and declined  [facility] protocol   Thiamine supplementation

## 2024-05-13 NOTE — CASE MANAGEMENT
Case Management Discharge Planning Note    Patient name Vladimir Delvalle  Location /-02 MRN [id number]  : 1965 Date 2024       Current Admission Date: 2024  Current Admission Diagnosis:Acute kidney failure (HCC)   Patient Active Problem List    Diagnosis Date Noted    Alcohol use disorder 2024    Transaminitis 2024    Abdominal pain 2024    Acute kidney failure (HCC) 2024    Generalized weakness 2024    Anxiety 2023    Mixed hyperlipidemia 2021    Carpal tunnel syndrome of right wrist 2020    Essential hypertension 2020      LOS (days): 0  Geometric Mean LOS (GMLOS) (days):   Days to GMLOS:     OBJECTIVE:            Current admission status: Observation   Preferred Pharmacy:   [id number] - [place] - [address]  [address]  [place] [address]  Phone: [phone] Fax: [phone]    [id number]  [place] - [address]   [address]   [facility] [address]  Phone: [phone] Fax: [phone]    Primary Care Provider: BEL Barrett    Primary Insurance: ELVIS GAITAN  Secondary Insurance:     DISCHARGE DETAILS:  Patient to follow with outpatient PT/OT as listed in AVS.  [facility]  [address] , [facility] [address]  [place] [address] [phone]

## 2024-05-13 NOTE — ASSESSMENT & PLAN NOTE
Baseline creatinine 0.9 - presented with creatinine 2.3, improved back to baseline of 0.85--0.61  Dry on exam  Likely prerenal MONAE due to volume depletion    Will discontinue IVF  Restart lisinopril  Avoid nephrotoxins  Monitor BMP

## 2024-05-13 NOTE — ASSESSMENT & PLAN NOTE
Does mention generalized weakness.   In the setting of chronic alcoholism, lack of proper p.o. intake, likely vitamin deficiency, electrolyte abnormalities including hyponatremia, hypokalemia  Replace potassium  Thiamine supplementation, nutritional support  PT/OT evaluated and determined no need for full evaluation given patient ambulating well AM-PAC 23

## 2024-05-13 NOTE — DISCHARGE SUMMARY
[facility]  Discharge- Vladimir Delvalle 1965, 58 y.o. male MRN: [id number]  Unit/Bed#: -02 Encounter: [id number]  Primary Care Provider: BEL Barrett   Date and time admitted to hospital: 5/11/2024 10:19 AM    * Acute kidney failure (HCC)  Assessment & Plan  Baseline creatinine 0.9 - presented with creatinine 2.3, improved back to baseline of 0.85--0.61  Dry on exam  Likely prerenal MONAE due to volume depletion    Will discontinue IVF  Restart lisinopril  Avoid nephrotoxins  Monitor BMP    Generalized weakness  Assessment & Plan  Does mention generalized weakness.   In the setting of chronic alcoholism, lack of proper p.o. intake, likely vitamin deficiency, electrolyte abnormalities including hyponatremia, hypokalemia  Replace potassium  Thiamine supplementation, nutritional support  PT/OT evaluated and determined no need for full evaluation given patient ambulating well AM-PAC 23     Abdominal pain  Assessment & Plan  Does mention epigastric and right upper quadrant abdominal pain.  Also mentions nausea with nonbilious vomiting lipase mildly elevated, AST and ALT elevated  CT does not reveal any pancreatitis but does reveal hepatomegaly    Suspect dyspeptic symptoms related to severe hx of alcohol consumption and also alcoholic steatohepatitis as the main source of symptoms  Supportive care  Advance diet as tolerated  Monitor abdominal exam  Alcohol cessation  , ALT 60, alk phos 74, bili 1.34, likely your baseline given alcoholic steatohepatitis    Transaminitis  Assessment & Plan  Noted with elevated AST and ALT with AST > ALT and elevated bili.  Suspect alcohol induced  Alcohol cessation  Monitor CMP    Alcohol use disorder  Assessment & Plan  Daily drinker  Strongly encouraged cessation, patient is motivated now that he has a grandchild, offered recovery center and declined  [facility] protocol   Thiamine supplementation      Essential hypertension  Assessment & Plan  PTA  on lisinopril  Initially held given MONAE, will continue to hold given normotensive behavior  Monitor BP      Medical Problems       Resolved Problems  Date Reviewed: 7/18/2023   None       Discharging Physician / Practitioner: Holland Ferguson PA-C  PCP: BEL Barrett  Admission Date:   Admission Orders (From admission, onward)       Ordered        05/11/24 1153  Place in Observation  Once                          Discharge Date: 05/13/24    Consultations During Hospital Stay:  None    Procedures Performed:   CT abdomen pelvis wo contrast  Impression: 1. No acute findings in the abdomen or pelvis. Normal-appearing pancreas. 2. Hepatomegaly and steatosis.      Significant Findings / Test Results:   Creatinine on admission 2.33, down trended to 0.61  AST on admission 163, ALT 74, alk phos 86, bilirubin 1.77, down trended to , ALT 60, alk phos 74, bili 1.34  Ethanol admission 73    Incidental Findings:   None    NoneTest Results Pending at Discharge (will require follow up):   None     Outpatient Tests Requested:  None    Complications: None    Reason for Admission: MONAE    Hospital Course:   Vladimir Delvalle is a 58 y.o. male patient who originally presented to the hospital on 5/11/2024 due to abdominal pain and dehydration.  The patient initially mentioned epigastric pain and right upper quadrant pain.  A CT was conducted while in the ED which revealed chronic hepatomegaly without pancreatitis.  The patient has a history of alcoholism and alcoholic steatohepatitis.  The patient was placed on CIWA protocol and given IVF in setting of MONAE.  The patient improved back to baseline  To be discharged home on 5/13.  The patient is medically stable and is recommended to follow-up with PCP and to continue alcohol cessation.  For further information in regards to course of hospitalization, please see notes attached.      Please see above list of diagnoses and related plan for additional information.     Condition at  Discharge: good    Discharge Day Visit / Exam:   Subjective: Patient reports feeling well and like to be discharged home.  He currently denies any chest pain/pressure, palpitations, lightness, nausea, shortness of breath, or chills.  Vitals: Blood Pressure: (!) 161/105 (05/13/24 0800)  Pulse: 75 (05/13/24 0800)  Temperature: 98.2 °F (36.8 °C) (05/12/24 2000)  Temp Source: Oral (05/12/24 2000)  Respirations: 18 (05/12/24 2300)  Height: 6' (182.9 cm) (05/12/24 0252)  Weight - Scale: 81.6 kg (180 lb) (05/12/24 0252)  SpO2: 95 % (05/13/24 0630)  Exam:   Physical Exam  Vitals and nursing note reviewed.   Constitutional:       Appearance: He is normal weight.   HENT:      Head: Normocephalic.      Nose: Nose normal.      Mouth/Throat:      Mouth: Mucous membranes are moist.      Pharynx: Oropharynx is clear.   Eyes:      General: No scleral icterus.     Conjunctiva/sclera: Conjunctivae normal.      Pupils: Pupils are equal, round, and reactive to light.   Cardiovascular:      Rate and Rhythm: Normal rate and regular rhythm.      Heart sounds: No murmur heard.     No friction rub. No gallop.   Pulmonary:      Effort: Pulmonary effort is normal. No respiratory distress.      Breath sounds: Normal breath sounds. No stridor. No wheezing, rhonchi or rales.   Abdominal:      General: Abdomen is flat.      Palpations: Abdomen is soft.   Musculoskeletal:         General: Normal range of motion.      Cervical back: Normal range of motion and neck supple.      Right lower leg: No edema.      Left lower leg: No edema.   Lymphadenopathy:      Cervical: No cervical adenopathy.   Skin:     General: Skin is warm.      Coloration: Skin is not jaundiced or pale.      Findings: No bruising, erythema or lesion.   Neurological:      General: No focal deficit present.      Mental Status: He is alert and oriented to person, place, and time. Mental status is at baseline.      Cranial Nerves: No cranial nerve deficit.      Motor: No weakness.    Psychiatric:         Mood and Affect: Mood normal.         Behavior: Behavior normal.         Thought Content: Thought content normal.          Discussion with Family: Patient declined call to .     Discharge instructions/Information to patient and family:   See after visit summary for information provided to patient and family.      Provisions for Follow-Up Care:  See after visit summary for information related to follow-up care and any pertinent home health orders.      Mobility at time of Discharge:   Basic Mobility Inpatient Raw Score: 23  JH-HLM Goal: 7: Walk 25 feet or more  JH-HLM Achieved: 7: Walk 25 feet or more  HLM Goal achieved. Continue to encourage appropriate mobility.     Disposition:   Home    Planned Readmission: No     Discharge Statement:  I spent 60 minutes discharging the patient. This time was spent on the day of discharge. I had direct contact with the patient on the day of discharge. Greater than 50% of the total time was spent examining patient, answering all patient questions, arranging and discussing plan of care with patient as well as directly providing post-discharge instructions.  Additional time then spent on discharge activities.    Discharge Medications:  See after visit summary for reconciled discharge medications provided to patient and/or family.      **Please Note: This note may have been constructed using a voice recognition system**

## 2024-05-13 NOTE — OCCUPATIONAL THERAPY NOTE
Occupational Therapy Screen     Patient Name: Vladimir Delvalle  Today's Date: 5/13/2024  Problem List  Principal Problem:    Acute kidney failure (HCC)  Active Problems:    Essential hypertension    Alcohol use disorder    Transaminitis    Abdominal pain    Generalized weakness          05/13/24 0909   OT Last Visit   OT Visit Date 05/13/24   Note Type   Note type Screen     Order received for OT Evaluation. Spoke with RN and patient who indicate that the patient has been functioning at an independent level in room with no assistive device.  The patient has no concerns about returning home and completing daily activities. Patient reports functioning at baseline, no Acute OT needs identified at this time to warrant OT services. D/C OT orders and will sign-off. Please re-consult if needed.      Annetta Hughes OTD, OTR/L

## 2024-05-23 ENCOUNTER — OFFICE VISIT (OUTPATIENT)
Dept: FAMILY MEDICINE CLINIC | Facility: CLINIC | Age: 59
End: 2024-05-23
Payer: COMMERCIAL

## 2024-05-23 VITALS
OXYGEN SATURATION: 95 % | WEIGHT: 179 LBS | DIASTOLIC BLOOD PRESSURE: 78 MMHG | HEART RATE: 95 BPM | BODY MASS INDEX: 24.24 KG/M2 | SYSTOLIC BLOOD PRESSURE: 122 MMHG | HEIGHT: 72 IN

## 2024-05-23 DIAGNOSIS — Z12.5 SCREENING PSA (PROSTATE SPECIFIC ANTIGEN): Primary | ICD-10-CM

## 2024-05-23 DIAGNOSIS — F41.9 ANXIETY: ICD-10-CM

## 2024-05-23 DIAGNOSIS — Z76.89 ENCOUNTER FOR SUPPORT AND COORDINATION OF TRANSITION OF CARE: ICD-10-CM

## 2024-05-23 DIAGNOSIS — N17.9 ACUTE RENAL FAILURE, UNSPECIFIED ACUTE RENAL FAILURE TYPE (HCC): ICD-10-CM

## 2024-05-23 DIAGNOSIS — Z00.00 HEALTHCARE MAINTENANCE: ICD-10-CM

## 2024-05-23 DIAGNOSIS — I10 ESSENTIAL HYPERTENSION: ICD-10-CM

## 2024-05-23 PROCEDURE — 99495 TRANSJ CARE MGMT MOD F2F 14D: CPT | Performed by: NURSE PRACTITIONER

## 2024-05-23 PROCEDURE — 99214 OFFICE O/P EST MOD 30 MIN: CPT | Performed by: NURSE PRACTITIONER

## 2024-05-23 RX ORDER — PAROXETINE HYDROCHLORIDE 20 MG/1
20 TABLET, FILM COATED ORAL DAILY
Qty: 90 TABLET | Refills: 1 | Status: SHIPPED | OUTPATIENT
Start: 2024-05-23

## 2024-05-23 NOTE — PROGRESS NOTES
Assessment/Plan:     Chronic Problems:  Essential hypertension  Blood pressure is well-managed with lisinopril daily.    Acute kidney failure (HCC)  Resolved in hospital.  Will obtain current labs.    Anxiety  Will increase paroxetine to 20 mg daily.      Visit Diagnosis:  Diagnoses and all orders for this visit:    Screening PSA (prostate specific antigen)  -     PSA, Total Screen; Future    Healthcare maintenance  -     Comprehensive metabolic panel; Future  -     CBC and differential; Future  -     Lipid panel; Future  -     PSA, Total Screen; Future    Anxiety  -     PARoxetine (PAXIL) 20 mg tablet; Take 1 tablet (20 mg total) by mouth daily    Essential hypertension    Acute renal failure, unspecified acute renal failure type (HCC)    Encounter for support and coordination of transition of care        Subjective:     Patient ID: Vladimir Delvalle is a 58 y.o. male.    Patient presents for hospital follow-up.  Patient was admitted from May 11 to 13 for acute kidney injury, transaminitis, abdominal pain.  Patient was discharged with no new medications.  He is feeling much better.  He states that his last drink was 1 week prior to hospitalization.        Active Problems    Patient Active Problem List   Diagnosis    Carpal tunnel syndrome of right wrist    Essential hypertension    Mixed hyperlipidemia    Anxiety    Alcohol use disorder    Transaminitis    Abdominal pain    Acute kidney failure (HCC)    Generalized weakness       Past Medical History     Past Medical History:   Diagnosis Date    Hypertension     Pneumonia        Surgical History    Past Surgical History:   Procedure Laterality Date    FINGER SURGERY Left     index    KNEE ARTHROSCOPY Left     LUNG SURGERY Left        Current Meds       Current Outpatient Medications:     albuterol (Proventil HFA) 90 mcg/act inhaler, Inhale 2 puffs every 6 (six) hours as needed for wheezing, Disp: 6.7 g, Rfl: 5    aspirin 81 mg chewable tablet, Chew 81 mg daily, Disp:  , Rfl:     lisinopril (ZESTRIL) 20 mg tablet, Take 1 tablet (20 mg total) by mouth daily, Disp: 90 tablet, Rfl: 1    PARoxetine (PAXIL) 20 mg tablet, Take 1 tablet (20 mg total) by mouth daily, Disp: 90 tablet, Rfl: 1    Allergies    No Known Allergies    No images are attached to the encounter.    Health Management    Health Maintenance   Topic Date Due    Pneumococcal Vaccine: Pediatrics (0 to 5 Years) and At-Risk Patients (6 to 64 Years) (1 of 2 - PCV) Never done    Zoster Vaccine (2 of 2) 09/25/2019    COVID-19 Vaccine (3 - 2023-24 season) 09/01/2023    Annual Physical  07/18/2024    Influenza Vaccine (Season Ended) 09/01/2024    Depression Screening  05/23/2025    RSV Vaccine Age 60+ Years (1 - 1-dose 60+ series) 12/02/2026    Colorectal Cancer Screening  11/18/2030    DTaP,Tdap,and Td Vaccines (3 - Td or Tdap) 06/26/2033    HIV Screening  Completed    Hepatitis C Screening  Completed    RSV Vaccine age 0-20 Months  Aged Out    HIB Vaccine  Aged Out    IPV Vaccine  Aged Out    Hepatitis A Vaccine  Aged Out    Meningococcal ACWY Vaccine  Aged Out    HPV Vaccine  Aged Out       CBC:   Results from last 6 Months   Lab Units 05/13/24  0553 05/12/24  0520   WBC Thousand/uL 4.48 3.74*   RBC Million/uL 3.97 3.47*   HEMOGLOBIN g/dL 13.1 11.5*   HEMATOCRIT % 38.2 33.5*   MCV fL 96 97   MCH pg 33.0 33.1   MCHC g/dL 34.3 34.3   RDW % 12.2 12.7   MPV fL 11.3 11.6   PLATELETS Thousands/uL 89* 73*   NRBC AUTO /100 WBCs  --  0   SEGS PCT %  --  53   LYMPHO PCT %  --  29   MONO PCT %  --  14*   EOS PCT %  --  2   BASOS PCT %  --  1   TOTAL NEUT ABS Thousands/µL  --  1.98   LYMPHS ABS Thousands/µL  --  1.10   MONOS ABS Thousand/µL  --  0.52   EOS ABS Thousand/µL  --  0.09     Chemistry Profile:   Results from last 6 Months   Lab Units 05/13/24  0553 05/12/24  0520   POTASSIUM mmol/L 3.3* 3.4*   CHLORIDE mmol/L 98 100   CO2 mmol/L 29 27   BUN mg/dL 9 20   CREATININE mg/dL 0.61 0.85   GLUCOSE FASTING mg/dL 85 88   CALCIUM mg/dL  "9.0 8.2*   MAGNESIUM mg/dL  --  2.1   PHOSPHORUS mg/dL  --  2.7   AST U/L 118* 96*   ALT U/L 60* 51   ALK PHOS U/L 74 70   EGFR ml/min/1.73sq m 110 96     Coagulation Studies:     Endocrine Studies:       Invalid input(s): \"[id number]\"    Imaging: CT abdomen pelvis wo contrast    Result Date: 5/11/2024  Narrative: CT ABDOMEN AND PELVIS WITHOUT IV CONTRAST INDICATION: n/v/d and abd pain, mild lipase elevation. COMPARISON: Ultrasound abdomen complete 11/15/2020 TECHNIQUE: CT examination of the abdomen and pelvis was performed without intravenous contrast. Multiplanar 2D reformatted images were created from the source data. This examination, like all CT scans performed in the [facility], was performed utilizing techniques to minimize radiation dose exposure, including the use of iterative reconstruction and automated exposure control. Radiation dose length product (DLP) for this visit: 622 mGy-cm Enteric Contrast: Not administered. FINDINGS: ABDOMEN LOWER CHEST: Heavy atherosclerotic coronary artery calcification. LIVER/BILIARY TREE: Hepatomegaly and steatosis. Right lateral liver dome calcified granuloma. GALLBLADDER: No calcified gallstones. No pericholecystic inflammatory change. SPLEEN: Unremarkable. PANCREAS: Unremarkable. ADRENAL GLANDS: Unremarkable. KIDNEYS/URETERS: Unremarkable. No hydronephrosis. STOMACH AND BOWEL: Colonic diverticulosis without findings of acute diverticulitis. APPENDIX: No findings to suggest appendicitis. ABDOMINOPELVIC CAVITY: No ascites. No pneumoperitoneum. No lymphadenopathy. VESSELS: Unremarkable for patient's age. PELVIS REPRODUCTIVE ORGANS: Unremarkable for patient's age. URINARY BLADDER: Unremarkable. ABDOMINAL WALL/INGUINAL REGIONS: Unremarkable. BONES: No acute fracture or suspicious osseous lesion. Healed left rib fractures.     Impression: 1. No acute findings in the abdomen or pelvis. Normal-appearing pancreas. 2. Hepatomegaly and steatosis. Resident: [facility]" RICH PERRY, the attending radiologist, have reviewed the images and agree with the final report above. Workstation performed: [id number]         Review of Systems   Constitutional:  Negative for chills, diaphoresis and fever.   HENT:  Negative for ear pain and sore throat.    Eyes:  Negative for pain and visual disturbance.   Respiratory:  Negative for cough and shortness of breath.    Cardiovascular:  Negative for chest pain and palpitations.   Gastrointestinal:  Negative for abdominal pain and vomiting.   Genitourinary:  Negative for dysuria and hematuria.   Musculoskeletal:  Negative for arthralgias and back pain.   Skin:  Negative for color change and rash.   Neurological:  Negative for dizziness, seizures, syncope, light-headedness and headaches.   Psychiatric/Behavioral:  Positive for dysphoric mood. Negative for sleep disturbance. The patient is nervous/anxious.    All other systems reviewed and are negative.        Objective:     Physical Exam  Constitutional:       General: He is not in acute distress.     Appearance: He is well-developed.   Cardiovascular:      Rate and Rhythm: Normal rate and regular rhythm.      Heart sounds: Normal heart sounds. No murmur heard.     No gallop.   Pulmonary:      Effort: Pulmonary effort is normal. No respiratory distress.      Breath sounds: Normal breath sounds. No wheezing.   Musculoskeletal:         General: Normal range of motion.   Skin:     General: Skin is warm and dry.   Neurological:      Mental Status: He is alert and oriented to person, place, and time.   Psychiatric:         Mood and Affect: Mood and affect normal.           /78   Pulse 95   Ht 6' (1.829 m)   Wt 81.2 kg (179 lb)   SpO2 95%   BMI 24.28 kg/m²     Vitals:    05/23/24 1445   BP: 122/78   Pulse: 95   SpO2: 95%   Weight: 81.2 kg (179 lb)   Height: 6' (1.829 m)       Transitional Care Management Review:  Vladimir Delvalle is a 58 y.o. male here for TCM follow up.     During the TCM phone call  patient stated:    TCM Call       Date and time call was made  5/13/2024  3:38 PM    Patient was hospitialized at  [facility]    Date of Admission  05/11/24    Date of discharge  05/13/24    Diagnosis  Acute kidney failure    Disposition  Home          TCM Call       I have advised the patient to call PCP with any new or worsening symptoms  FLORA Lenz CRNP

## 2024-07-16 ENCOUNTER — APPOINTMENT (OUTPATIENT)
Dept: LAB | Facility: HOSPITAL | Age: 59
End: 2024-07-16

## 2024-07-16 DIAGNOSIS — Z12.5 SCREENING FOR PROSTATE CANCER: ICD-10-CM

## 2024-07-16 DIAGNOSIS — Z00.00 HEALTHCARE MAINTENANCE: ICD-10-CM

## 2024-07-16 DIAGNOSIS — I10 ESSENTIAL HYPERTENSION: ICD-10-CM

## 2024-07-16 LAB
ALBUMIN SERPL BCG-MCNC: 4.1 G/DL (ref 3.5–5)
ALP SERPL-CCNC: 72 U/L (ref 34–104)
ALT SERPL W P-5'-P-CCNC: 11 U/L (ref 7–52)
ANION GAP SERPL CALCULATED.3IONS-SCNC: 6 MMOL/L (ref 4–13)
AST SERPL W P-5'-P-CCNC: 20 U/L (ref 13–39)
BASOPHILS # BLD AUTO: 0.07 THOUSANDS/ÂΜL (ref 0–0.1)
BASOPHILS NFR BLD AUTO: 1 % (ref 0–1)
BILIRUB SERPL-MCNC: 0.83 MG/DL (ref 0.2–1)
BUN SERPL-MCNC: 13 MG/DL (ref 5–25)
CALCIUM SERPL-MCNC: 10 MG/DL (ref 8.4–10.2)
CHLORIDE SERPL-SCNC: 101 MMOL/L (ref 96–108)
CHOLEST SERPL-MCNC: 203 MG/DL
CO2 SERPL-SCNC: 33 MMOL/L (ref 21–32)
CREAT SERPL-MCNC: 0.9 MG/DL (ref 0.6–1.3)
CREAT UR-MCNC: 169.4 MG/DL
EOSINOPHIL # BLD AUTO: 0.19 THOUSAND/ÂΜL (ref 0–0.61)
EOSINOPHIL NFR BLD AUTO: 3 % (ref 0–6)
ERYTHROCYTE [DISTWIDTH] IN BLOOD BY AUTOMATED COUNT: 12.7 % (ref 11.6–15.1)
GFR SERPL CREATININE-BSD FRML MDRD: 94 ML/MIN/1.73SQ M
GLUCOSE P FAST SERPL-MCNC: 75 MG/DL (ref 65–99)
HCT VFR BLD AUTO: 45.1 % (ref 36.5–49.3)
HDLC SERPL-MCNC: 45 MG/DL
HGB BLD-MCNC: 15 G/DL (ref 12–17)
IMM GRANULOCYTES # BLD AUTO: 0.03 THOUSAND/UL (ref 0–0.2)
IMM GRANULOCYTES NFR BLD AUTO: 0 % (ref 0–2)
LDLC SERPL CALC-MCNC: 129 MG/DL (ref 0–100)
LYMPHOCYTES # BLD AUTO: 2.49 THOUSANDS/ÂΜL (ref 0.6–4.47)
LYMPHOCYTES NFR BLD AUTO: 37 % (ref 14–44)
MCH RBC QN AUTO: 32 PG (ref 26.8–34.3)
MCHC RBC AUTO-ENTMCNC: 33.3 G/DL (ref 31.4–37.4)
MCV RBC AUTO: 96 FL (ref 82–98)
MICROALBUMIN UR-MCNC: 17.3 MG/L
MICROALBUMIN/CREAT 24H UR: 10 MG/G CREATININE (ref 0–30)
MONOCYTES # BLD AUTO: 0.75 THOUSAND/ÂΜL (ref 0.17–1.22)
MONOCYTES NFR BLD AUTO: 11 % (ref 4–12)
NEUTROPHILS # BLD AUTO: 3.22 THOUSANDS/ÂΜL (ref 1.85–7.62)
NEUTS SEG NFR BLD AUTO: 48 % (ref 43–75)
NONHDLC SERPL-MCNC: 158 MG/DL
NRBC BLD AUTO-RTO: 0 /100 WBCS
PLATELET # BLD AUTO: 204 THOUSANDS/UL (ref 149–390)
PMV BLD AUTO: 9.6 FL (ref 8.9–12.7)
POTASSIUM SERPL-SCNC: 4.5 MMOL/L (ref 3.5–5.3)
PROT SERPL-MCNC: 7.4 G/DL (ref 6.4–8.4)
PSA SERPL-MCNC: 0.66 NG/ML (ref 0–4)
RBC # BLD AUTO: 4.69 MILLION/UL (ref 3.88–5.62)
SODIUM SERPL-SCNC: 140 MMOL/L (ref 135–147)
T4 FREE SERPL-MCNC: 0.55 NG/DL (ref 0.61–1.12)
TRIGL SERPL-MCNC: 144 MG/DL
TSH SERPL DL<=0.05 MIU/L-ACNC: 4.71 UIU/ML (ref 0.45–4.5)
WBC # BLD AUTO: 6.75 THOUSAND/UL (ref 4.31–10.16)

## 2024-07-16 PROCEDURE — 80061 LIPID PANEL: CPT

## 2024-07-16 PROCEDURE — 82043 UR ALBUMIN QUANTITATIVE: CPT

## 2024-07-16 PROCEDURE — 84439 ASSAY OF FREE THYROXINE: CPT

## 2024-07-16 PROCEDURE — 85025 COMPLETE CBC W/AUTO DIFF WBC: CPT

## 2024-07-16 PROCEDURE — 36415 COLL VENOUS BLD VENIPUNCTURE: CPT

## 2024-07-16 PROCEDURE — G0103 PSA SCREENING: HCPCS

## 2024-07-16 PROCEDURE — 80053 COMPREHEN METABOLIC PANEL: CPT

## 2024-07-16 PROCEDURE — 82570 ASSAY OF URINE CREATININE: CPT

## 2024-07-16 PROCEDURE — 84443 ASSAY THYROID STIM HORMONE: CPT

## 2024-07-18 DIAGNOSIS — E03.9 HYPOTHYROIDISM, UNSPECIFIED TYPE: Primary | ICD-10-CM

## 2024-07-18 RX ORDER — LEVOTHYROXINE SODIUM 0.03 MG/1
25 TABLET ORAL
Qty: 100 TABLET | Refills: 3 | Status: SHIPPED | OUTPATIENT
Start: 2024-07-18

## 2024-08-04 DIAGNOSIS — I10 ESSENTIAL HYPERTENSION: ICD-10-CM

## 2024-08-04 RX ORDER — LISINOPRIL 20 MG/1
20 TABLET ORAL DAILY
Qty: 90 TABLET | Refills: 1 | Status: SHIPPED | OUTPATIENT
Start: 2024-08-04

## 2024-08-23 ENCOUNTER — OFFICE VISIT (OUTPATIENT)
Dept: FAMILY MEDICINE CLINIC | Facility: CLINIC | Age: 59
End: 2024-08-23

## 2024-08-23 VITALS
HEIGHT: 72 IN | HEART RATE: 54 BPM | DIASTOLIC BLOOD PRESSURE: 82 MMHG | SYSTOLIC BLOOD PRESSURE: 112 MMHG | OXYGEN SATURATION: 98 % | BODY MASS INDEX: 24.28 KG/M2

## 2024-08-23 DIAGNOSIS — R11.0 NAUSEA: ICD-10-CM

## 2024-08-23 DIAGNOSIS — F10.90 ALCOHOL USE DISORDER: ICD-10-CM

## 2024-08-23 DIAGNOSIS — E78.2 MIXED HYPERLIPIDEMIA: ICD-10-CM

## 2024-08-23 DIAGNOSIS — I10 ESSENTIAL HYPERTENSION: Primary | ICD-10-CM

## 2024-08-23 DIAGNOSIS — F41.9 ANXIETY: ICD-10-CM

## 2024-08-23 PROBLEM — N17.9 ACUTE KIDNEY FAILURE (HCC): Status: RESOLVED | Noted: 2024-05-11 | Resolved: 2024-08-23

## 2024-08-23 PROBLEM — R10.9 ABDOMINAL PAIN: Status: RESOLVED | Noted: 2024-05-11 | Resolved: 2024-08-23

## 2024-08-23 PROCEDURE — 99214 OFFICE O/P EST MOD 30 MIN: CPT | Performed by: NURSE PRACTITIONER

## 2024-08-23 RX ORDER — ONDANSETRON 4 MG/1
4 TABLET, FILM COATED ORAL EVERY 8 HOURS PRN
Qty: 20 TABLET | Refills: 0 | Status: SHIPPED | OUTPATIENT
Start: 2024-08-23

## 2024-08-23 NOTE — PROGRESS NOTES
Ambulatory Visit  Name: Vladimir Delvalle      : 1965      MRN: [id number]  Encounter Provider: BEL Barrett  Encounter Date: 2024   Encounter department: [facility] [address]    Assessment & Plan   1. Essential hypertension  Assessment & Plan:  Blood pressure is well-managed with lisinopril daily.  2. Nausea  -     ondansetron (ZOFRAN) 4 mg tablet; Take 1 tablet (4 mg total) by mouth every 8 (eight) hours as needed for nausea or vomiting  3. Alcohol use disorder  Assessment & Plan:  Patient denies current use of alcohol.  4. Anxiety  Assessment & Plan:  Doing well with Paxil.  5. Mixed hyperlipidemia       History of Present Illness     Patient presents for routine follow up. He has had GI symptoms for several days--nausea, vomiting.         Review of Systems   Constitutional:  Negative for chills and fever.   HENT:  Negative for ear pain and sore throat.    Eyes:  Negative for pain and visual disturbance.   Respiratory:  Negative for cough and shortness of breath.    Cardiovascular:  Negative for chest pain and palpitations.   Gastrointestinal:  Positive for nausea and vomiting. Negative for abdominal pain.   Genitourinary:  Negative for dysuria and hematuria.   Musculoskeletal:  Negative for arthralgias and back pain.   Skin:  Negative for color change and rash.   Neurological:  Negative for dizziness, seizures, syncope, light-headedness and headaches.   Psychiatric/Behavioral:  Negative for dysphoric mood. The patient is nervous/anxious (manageable).    All other systems reviewed and are negative.    Current Outpatient Medications on File Prior to Visit   Medication Sig Dispense Refill    albuterol (Proventil HFA) 90 mcg/act inhaler Inhale 2 puffs every 6 (six) hours as needed for wheezing 6.7 g 5    aspirin 81 mg chewable tablet Chew 81 mg daily      levothyroxine 25 mcg tablet Take 1 tablet (25 mcg total) by mouth daily in the early morning 100 tablet 3     lisinopril (ZESTRIL) 20 mg tablet TAKE 1 TABLET BY MOUTH EVERY DAY 90 tablet 1    PARoxetine (PAXIL) 20 mg tablet Take 1 tablet (20 mg total) by mouth daily 90 tablet 1     No current facility-administered medications on file prior to visit.      Objective     /82   Pulse (!) 54   Ht 6' (1.829 m)   SpO2 98%   BMI 24.28 kg/m²     Physical Exam  Vitals and nursing note reviewed.   Constitutional:       General: He is not in acute distress.     Appearance: He is well-developed.   HENT:      Head: Normocephalic and atraumatic.   Eyes:      Conjunctiva/sclera: Conjunctivae normal.   Cardiovascular:      Rate and Rhythm: Normal rate and regular rhythm.      Heart sounds: No murmur heard.  Pulmonary:      Effort: Pulmonary effort is normal. No respiratory distress.      Breath sounds: Normal breath sounds.   Abdominal:      Palpations: Abdomen is soft.      Tenderness: There is no abdominal tenderness.   Musculoskeletal:         General: No swelling.      Cervical back: Neck supple.   Skin:     General: Skin is warm and dry.      Capillary Refill: Capillary refill takes less than 2 seconds.   Neurological:      Mental Status: He is alert.   Psychiatric:         Mood and Affect: Mood normal.       Administrative Statements   I have spent a total time of 15 minutes in caring for this patient on the day of the visit/encounter including Documenting in the medical record, Reviewing / ordering tests, medicine, procedures  , and Obtaining or reviewing history  .

## 2024-09-21 ENCOUNTER — APPOINTMENT (EMERGENCY)
Dept: CT IMAGING | Facility: HOSPITAL | Age: 59
DRG: 241 | End: 2024-09-21
Payer: COMMERCIAL

## 2024-09-21 ENCOUNTER — APPOINTMENT (EMERGENCY)
Dept: RADIOLOGY | Facility: HOSPITAL | Age: 59
DRG: 241 | End: 2024-09-21
Payer: COMMERCIAL

## 2024-09-21 ENCOUNTER — HOSPITAL ENCOUNTER (INPATIENT)
Facility: HOSPITAL | Age: 59
LOS: 3 days | Discharge: HOME | DRG: 241 | End: 2024-09-24
Attending: STUDENT IN AN ORGANIZED HEALTH CARE EDUCATION/TRAINING PROGRAM | Admitting: INTERNAL MEDICINE
Payer: COMMERCIAL

## 2024-09-21 DIAGNOSIS — R74.01 TRANSAMINITIS: ICD-10-CM

## 2024-09-21 DIAGNOSIS — F10.90 ALCOHOL USE DISORDER: ICD-10-CM

## 2024-09-21 DIAGNOSIS — J02.9 SORE THROAT: ICD-10-CM

## 2024-09-21 DIAGNOSIS — E87.1 HYPONATREMIA: Primary | ICD-10-CM

## 2024-09-21 DIAGNOSIS — R11.2 NAUSEA & VOMITING: ICD-10-CM

## 2024-09-21 DIAGNOSIS — N17.9 AKI (ACUTE KIDNEY INJURY) (HCC): ICD-10-CM

## 2024-09-21 LAB
2HR DELTA HS TROPONIN: -2 NG/L
4HR DELTA HS TROPONIN: -2 NG/L
ABO GROUP BLD: NORMAL
ABO GROUP BLD: NORMAL
ALBUMIN SERPL BCG-MCNC: 2.6 G/DL (ref 3.5–5)
ALP SERPL-CCNC: 310 U/L (ref 34–104)
ALT SERPL W P-5'-P-CCNC: 111 U/L (ref 7–52)
AMMONIA PLAS-SCNC: 14 UMOL/L (ref 18–72)
ANION GAP SERPL CALCULATED.3IONS-SCNC: 16 MMOL/L (ref 4–13)
ANION GAP SERPL CALCULATED.3IONS-SCNC: 21 MMOL/L (ref 4–13)
APAP SERPL-MCNC: <2 UG/ML (ref 10–20)
ARTERIAL PATENCY WRIST A: YES
AST SERPL W P-5'-P-CCNC: 317 U/L (ref 13–39)
ATRIAL RATE: 104 BPM
BASE EX.OXY STD BLDV CALC-SCNC: 67.6 % (ref 60–80)
BASE EXCESS BLDA CALC-SCNC: -3.4 MMOL/L
BASE EXCESS BLDV CALC-SCNC: -16.2 MMOL/L
BASOPHILS # BLD AUTO: 0.02 THOUSANDS/ΜL (ref 0–0.1)
BASOPHILS NFR BLD AUTO: 0 % (ref 0–1)
BILIRUB SERPL-MCNC: 3.07 MG/DL (ref 0.2–1)
BLD GP AB SCN SERPL QL: NEGATIVE
BUN SERPL-MCNC: 24 MG/DL (ref 5–25)
BUN SERPL-MCNC: 26 MG/DL (ref 5–25)
CALCIUM ALBUM COR SERPL-MCNC: 8.6 MG/DL (ref 8.3–10.1)
CALCIUM SERPL-MCNC: 6.9 MG/DL (ref 8.4–10.2)
CALCIUM SERPL-MCNC: 7.5 MG/DL (ref 8.4–10.2)
CARDIAC TROPONIN I PNL SERPL HS: 27 NG/L
CARDIAC TROPONIN I PNL SERPL HS: 27 NG/L
CARDIAC TROPONIN I PNL SERPL HS: 29 NG/L
CHLORIDE SERPL-SCNC: 87 MMOL/L (ref 96–108)
CHLORIDE SERPL-SCNC: 93 MMOL/L (ref 96–108)
CO2 SERPL-SCNC: 17 MMOL/L (ref 21–32)
CO2 SERPL-SCNC: 18 MMOL/L (ref 21–32)
CREAT SERPL-MCNC: 3.57 MG/DL (ref 0.6–1.3)
CREAT SERPL-MCNC: 4.07 MG/DL (ref 0.6–1.3)
EOSINOPHIL # BLD AUTO: 0.02 THOUSAND/ΜL (ref 0–0.61)
EOSINOPHIL NFR BLD AUTO: 0 % (ref 0–6)
ERYTHROCYTE [DISTWIDTH] IN BLOOD BY AUTOMATED COUNT: 15.8 % (ref 11.6–15.1)
ETHANOL SERPL-MCNC: 43 MG/DL
GFR SERPL CREATININE-BSD FRML MDRD: 15 ML/MIN/1.73SQ M
GFR SERPL CREATININE-BSD FRML MDRD: 17 ML/MIN/1.73SQ M
GLUCOSE SERPL-MCNC: 54 MG/DL (ref 65–140)
GLUCOSE SERPL-MCNC: 67 MG/DL (ref 65–140)
HCO3 BLDA-SCNC: 19.5 MMOL/L (ref 22–28)
HCO3 BLDV-SCNC: 8.9 MMOL/L (ref 24–30)
HCT VFR BLD AUTO: 30.7 % (ref 36.5–49.3)
HGB BLD-MCNC: 10.9 G/DL (ref 12–17)
IMM GRANULOCYTES # BLD AUTO: 0.02 THOUSAND/UL (ref 0–0.2)
IMM GRANULOCYTES NFR BLD AUTO: 0 % (ref 0–2)
LACTATE SERPL-SCNC: 2.7 MMOL/L (ref 0.5–2)
LACTATE SERPL-SCNC: 3.1 MMOL/L (ref 0.5–2)
LIPASE SERPL-CCNC: 70 U/L (ref 11–82)
LYMPHOCYTES # BLD AUTO: 1.15 THOUSANDS/ΜL (ref 0.6–4.47)
LYMPHOCYTES NFR BLD AUTO: 20 % (ref 14–44)
MCH RBC QN AUTO: 33.3 PG (ref 26.8–34.3)
MCHC RBC AUTO-ENTMCNC: 35.5 G/DL (ref 31.4–37.4)
MCV RBC AUTO: 94 FL (ref 82–98)
MONOCYTES # BLD AUTO: 0.41 THOUSAND/ΜL (ref 0.17–1.22)
MONOCYTES NFR BLD AUTO: 7 % (ref 4–12)
NEUTROPHILS # BLD AUTO: 4.04 THOUSANDS/ΜL (ref 1.85–7.62)
NEUTS SEG NFR BLD AUTO: 73 % (ref 43–75)
NRBC BLD AUTO-RTO: 0 /100 WBCS
O2 CT BLDA-SCNC: 14.1 ML/DL (ref 16–23)
O2 CT BLDV-SCNC: 4.8 ML/DL
OXYHGB MFR BLDA: 94.6 % (ref 94–97)
P AXIS: 55 DEGREES
PCO2 BLDA: 28.1 MM HG (ref 36–44)
PCO2 BLDV: 18.7 MM HG (ref 42–50)
PH BLDA: 7.46 [PH] (ref 7.35–7.45)
PH BLDV: 7.3 [PH] (ref 7.3–7.4)
PLATELET # BLD AUTO: 46 THOUSANDS/UL (ref 149–390)
PLATELET BLD QL SMEAR: ABNORMAL
PMV BLD AUTO: 12 FL (ref 8.9–12.7)
PO2 BLDA: 82.2 MM HG (ref 75–129)
PO2 BLDV: 44.8 MM HG (ref 35–45)
POTASSIUM SERPL-SCNC: 3.7 MMOL/L (ref 3.5–5.3)
POTASSIUM SERPL-SCNC: 3.9 MMOL/L (ref 3.5–5.3)
PR INTERVAL: 170 MS
PROT SERPL-MCNC: 4.9 G/DL (ref 6.4–8.4)
QRS AXIS: 61 DEGREES
QRSD INTERVAL: 82 MS
QT INTERVAL: 396 MS
QTC INTERVAL: 520 MS
RBC # BLD AUTO: 3.27 MILLION/UL (ref 3.88–5.62)
RBC MORPH BLD: NORMAL
RH BLD: POSITIVE
RH BLD: POSITIVE
SALICYLATES SERPL-MCNC: <5 MG/DL (ref 3–20)
SODIUM SERPL-SCNC: 126 MMOL/L (ref 135–147)
SODIUM SERPL-SCNC: 126 MMOL/L (ref 135–147)
SPECIMEN EXPIRATION DATE: NORMAL
SPECIMEN SOURCE: ABNORMAL
T WAVE AXIS: 37 DEGREES
VENTRICULAR RATE: 104 BPM
WBC # BLD AUTO: 5.66 THOUSAND/UL (ref 4.31–10.16)

## 2024-09-21 PROCEDURE — 74176 CT ABD & PELVIS W/O CONTRAST: CPT

## 2024-09-21 PROCEDURE — 83690 ASSAY OF LIPASE: CPT | Performed by: STUDENT IN AN ORGANIZED HEALTH CARE EDUCATION/TRAINING PROGRAM

## 2024-09-21 PROCEDURE — 71045 X-RAY EXAM CHEST 1 VIEW: CPT

## 2024-09-21 PROCEDURE — 36415 COLL VENOUS BLD VENIPUNCTURE: CPT | Performed by: STUDENT IN AN ORGANIZED HEALTH CARE EDUCATION/TRAINING PROGRAM

## 2024-09-21 PROCEDURE — 82140 ASSAY OF AMMONIA: CPT | Performed by: STUDENT IN AN ORGANIZED HEALTH CARE EDUCATION/TRAINING PROGRAM

## 2024-09-21 PROCEDURE — 99285 EMERGENCY DEPT VISIT HI MDM: CPT

## 2024-09-21 PROCEDURE — 80048 BASIC METABOLIC PNL TOTAL CA: CPT | Performed by: STUDENT IN AN ORGANIZED HEALTH CARE EDUCATION/TRAINING PROGRAM

## 2024-09-21 PROCEDURE — 83605 ASSAY OF LACTIC ACID: CPT | Performed by: INTERNAL MEDICINE

## 2024-09-21 PROCEDURE — 82805 BLOOD GASES W/O2 SATURATION: CPT | Performed by: STUDENT IN AN ORGANIZED HEALTH CARE EDUCATION/TRAINING PROGRAM

## 2024-09-21 PROCEDURE — 99285 EMERGENCY DEPT VISIT HI MDM: CPT | Performed by: STUDENT IN AN ORGANIZED HEALTH CARE EDUCATION/TRAINING PROGRAM

## 2024-09-21 PROCEDURE — 80143 DRUG ASSAY ACETAMINOPHEN: CPT | Performed by: STUDENT IN AN ORGANIZED HEALTH CARE EDUCATION/TRAINING PROGRAM

## 2024-09-21 PROCEDURE — 82077 ASSAY SPEC XCP UR&BREATH IA: CPT | Performed by: STUDENT IN AN ORGANIZED HEALTH CARE EDUCATION/TRAINING PROGRAM

## 2024-09-21 PROCEDURE — 80179 DRUG ASSAY SALICYLATE: CPT | Performed by: STUDENT IN AN ORGANIZED HEALTH CARE EDUCATION/TRAINING PROGRAM

## 2024-09-21 PROCEDURE — 36600 WITHDRAWAL OF ARTERIAL BLOOD: CPT

## 2024-09-21 PROCEDURE — 85025 COMPLETE CBC W/AUTO DIFF WBC: CPT | Performed by: STUDENT IN AN ORGANIZED HEALTH CARE EDUCATION/TRAINING PROGRAM

## 2024-09-21 PROCEDURE — 86900 BLOOD TYPING SEROLOGIC ABO: CPT | Performed by: STUDENT IN AN ORGANIZED HEALTH CARE EDUCATION/TRAINING PROGRAM

## 2024-09-21 PROCEDURE — 86850 RBC ANTIBODY SCREEN: CPT | Performed by: STUDENT IN AN ORGANIZED HEALTH CARE EDUCATION/TRAINING PROGRAM

## 2024-09-21 PROCEDURE — 93010 ELECTROCARDIOGRAM REPORT: CPT | Performed by: STUDENT IN AN ORGANIZED HEALTH CARE EDUCATION/TRAINING PROGRAM

## 2024-09-21 PROCEDURE — 99223 1ST HOSP IP/OBS HIGH 75: CPT | Performed by: INTERNAL MEDICINE

## 2024-09-21 PROCEDURE — 96361 HYDRATE IV INFUSION ADD-ON: CPT

## 2024-09-21 PROCEDURE — 84484 ASSAY OF TROPONIN QUANT: CPT | Performed by: STUDENT IN AN ORGANIZED HEALTH CARE EDUCATION/TRAINING PROGRAM

## 2024-09-21 PROCEDURE — 93005 ELECTROCARDIOGRAM TRACING: CPT

## 2024-09-21 PROCEDURE — 96374 THER/PROPH/DIAG INJ IV PUSH: CPT

## 2024-09-21 PROCEDURE — 80053 COMPREHEN METABOLIC PANEL: CPT | Performed by: STUDENT IN AN ORGANIZED HEALTH CARE EDUCATION/TRAINING PROGRAM

## 2024-09-21 PROCEDURE — 86901 BLOOD TYPING SEROLOGIC RH(D): CPT | Performed by: STUDENT IN AN ORGANIZED HEALTH CARE EDUCATION/TRAINING PROGRAM

## 2024-09-21 PROCEDURE — 70450 CT HEAD/BRAIN W/O DYE: CPT

## 2024-09-21 RX ORDER — ONDANSETRON 2 MG/ML
4 INJECTION INTRAMUSCULAR; INTRAVENOUS ONCE
Status: COMPLETED | OUTPATIENT
Start: 2024-09-21 | End: 2024-09-21

## 2024-09-21 RX ORDER — MORPHINE SULFATE 4 MG/ML
4 INJECTION, SOLUTION INTRAMUSCULAR; INTRAVENOUS ONCE
Status: DISCONTINUED | OUTPATIENT
Start: 2024-09-21 | End: 2024-09-21

## 2024-09-21 RX ORDER — ONDANSETRON 2 MG/ML
4 INJECTION INTRAMUSCULAR; INTRAVENOUS EVERY 6 HOURS PRN
Status: DISCONTINUED | OUTPATIENT
Start: 2024-09-21 | End: 2024-09-24 | Stop reason: HOSPADM

## 2024-09-21 RX ORDER — ONDANSETRON 2 MG/ML
1 INJECTION INTRAMUSCULAR; INTRAVENOUS ONCE
Status: COMPLETED | OUTPATIENT
Start: 2024-09-21 | End: 2024-09-21

## 2024-09-21 RX ORDER — PANTOPRAZOLE SODIUM 40 MG/10ML
40 INJECTION, POWDER, LYOPHILIZED, FOR SOLUTION INTRAVENOUS EVERY 12 HOURS SCHEDULED
Status: DISCONTINUED | OUTPATIENT
Start: 2024-09-21 | End: 2024-09-24 | Stop reason: HOSPADM

## 2024-09-21 RX ORDER — HEPARIN SODIUM 5000 [USP'U]/ML
5000 INJECTION, SOLUTION INTRAVENOUS; SUBCUTANEOUS EVERY 8 HOURS SCHEDULED
Status: DISCONTINUED | OUTPATIENT
Start: 2024-09-22 | End: 2024-09-22

## 2024-09-21 RX ORDER — SODIUM CHLORIDE 9 MG/ML
125 INJECTION, SOLUTION INTRAVENOUS CONTINUOUS
Status: DISCONTINUED | OUTPATIENT
Start: 2024-09-21 | End: 2024-09-22

## 2024-09-21 RX ADMIN — SODIUM CHLORIDE 1000 ML: 0.9 INJECTION, SOLUTION INTRAVENOUS at 13:59

## 2024-09-21 RX ADMIN — Medication 1 SPRAY: at 22:03

## 2024-09-21 RX ADMIN — ONDANSETRON 4 MG: 2 INJECTION INTRAMUSCULAR; INTRAVENOUS at 13:59

## 2024-09-21 RX ADMIN — PANTOPRAZOLE SODIUM 40 MG: 40 INJECTION, POWDER, FOR SOLUTION INTRAVENOUS at 18:14

## 2024-09-21 RX ADMIN — SODIUM CHLORIDE 125 ML/HR: 0.9 INJECTION, SOLUTION INTRAVENOUS at 17:15

## 2024-09-21 RX ADMIN — SODIUM CHLORIDE 1000 ML: 0.9 INJECTION, SOLUTION INTRAVENOUS at 14:36

## 2024-09-21 NOTE — ASSESSMENT & PLAN NOTE
Bilirubin noted to be 3, AST noted to be 300, , alk phos 300  Possibly ischemic hepatitis?  CT scan revealed no acute abnormalities  Will provide IV fluids  Repeat LFTs in the morning if remain persistently elevated we recommended right upper quadrant ultrasound  Currently without abdominal pain  Consider GI consultation

## 2024-09-21 NOTE — ASSESSMENT & PLAN NOTE
Sodium noted to be 126  Likely from volume depletion versus poor solute intake  Will continue IV fluids  Follow-up nephrology recommendations

## 2024-09-21 NOTE — ASSESSMENT & PLAN NOTE
Baseline creatinine of approximately 1  Presented with a creatinine of 4  Suspect from significant volume depletion  Will continue with aggressive IV fluids  Repeat BMP in the morning  Follow-up nephrology consultation

## 2024-09-21 NOTE — H&P
H&P - Hospitalist   Name: Vladimir Delvalle 58 y.o. male I MRN: [id number]  Unit/Bed#: ED 08 I Date of Admission: 9/21/2024   Date of Service: 9/21/2024 I Hospital Day: 0     Assessment & Plan  MONAE (acute kidney injury) (HCC)  Baseline creatinine of approximately 1  Presented with a creatinine of 4  Suspect from significant volume depletion  Will continue with aggressive IV fluids  Repeat BMP in the morning  Follow-up nephrology consultation  Essential hypertension  Hold lisinopril in setting of acute kidney injury  Elevated liver enzymes  Bilirubin noted to be 3, AST noted to be 300, , alk phos 300  Possibly ischemic hepatitis?  CT scan revealed no acute abnormalities  Will provide IV fluids  Repeat LFTs in the morning if remain persistently elevated we recommended right upper quadrant ultrasound  Currently without abdominal pain  Consider GI consultation  Anxiety  Continue Paxil    VTE Pharmacologic Prophylaxis:   Moderate Risk (Score 3-4) - Pharmacological DVT Prophylaxis Ordered: heparin.  Code Status: full code  Discussion with family: Patient declined call to .     Anticipated Length of Stay: Patient will be admitted on an inpatient basis with an anticipated length of stay of greater than 2 midnights secondary to acute kidney injury.    History of Present Illness   Chief Complaint: Poor appetite    Vladimir Delvalle is a 58 y.o. male with past medical history significant alcohol abuse, hypertension, anxiety initially presented with poor appetite.  Patient reports poor appetite with associated nausea and vomiting.  Otherwise denies any acute complaints.  Denies abdominal pain, fevers, chills, cough, shortness of breath, chest pain or any other complaints  Review of Systems   Constitutional:  Negative for appetite change, chills, diaphoresis, fatigue, fever and unexpected weight change.   HENT:  Negative for congestion, rhinorrhea and sore throat.    Eyes:  Negative for photophobia and visual  disturbance.   Respiratory:  Negative for cough, shortness of breath and wheezing.    Cardiovascular:  Negative for chest pain, palpitations and leg swelling.   Gastrointestinal:  Positive for nausea and vomiting. Negative for abdominal pain, anal bleeding, blood in stool, constipation and diarrhea.   Genitourinary:  Negative for decreased urine volume, difficulty urinating, dysuria, flank pain, frequency, hematuria and urgency.   Musculoskeletal:  Negative for arthralgias, back pain, joint swelling and myalgias.   Skin:  Negative for color change and rash.   Neurological:  Negative for dizziness, seizures, facial asymmetry, speech difficulty, numbness and headaches.   Psychiatric/Behavioral:  Negative for agitation, confusion and decreased concentration. The patient is not nervous/anxious.        I have reviewed the patient's PMH, PSH, Social History, Family History, Meds, and Allergies  Social History:  Marital Status: Single     Patient Pre-hospital Living Situation: Home  Patient Pre-hospital Level of Mobility: walks  Patient Pre-hospital Diet Restrictions: none    Objective     Vitals:   Blood Pressure: 102/63 (09/21/24 1645)  Pulse: 88 (09/21/24 1645)  Temperature: 98.5 °F (36.9 °C) (09/21/24 1338)  Temp Source: Oral (09/21/24 1338)  Respirations: 18 (09/21/24 1645)  Height: 6' (182.9 cm) (09/21/24 1338)  Weight - Scale: 92.6 kg (204 lb 2.3 oz) (09/21/24 1338)  SpO2: 92 % (09/21/24 1645)    Physical Exam  Constitutional:       General: He is not in acute distress.     Appearance: He is well-developed. He is not diaphoretic.   HENT:      Head: Normocephalic and atraumatic.      Nose: Nose normal.      Mouth/Throat:      Mouth: Oropharynx is clear and moist.      Pharynx: No oropharyngeal exudate.   Eyes:      General: No scleral icterus.        Right eye: No discharge.         Left eye: No discharge.      Extraocular Movements: EOM normal.      Conjunctiva/sclera: Conjunctivae normal.   Neck:      Vascular: No  JVD.   Cardiovascular:      Rate and Rhythm: Normal rate and regular rhythm.      Heart sounds: Normal heart sounds. No murmur heard.     No friction rub. No gallop.   Pulmonary:      Effort: Pulmonary effort is normal. No respiratory distress.      Breath sounds: Normal breath sounds. No wheezing or rales.   Chest:      Chest wall: No tenderness.   Abdominal:      General: Bowel sounds are normal. There is no distension.      Palpations: Abdomen is soft. There is no mass.      Tenderness: There is no abdominal tenderness. There is no guarding or rebound.   Musculoskeletal:         General: No tenderness, deformity or edema. Normal range of motion.      Cervical back: Normal range of motion and neck supple.   Lymphadenopathy:      Cervical: No cervical adenopathy.   Skin:     General: Skin is warm and dry.      Coloration: Skin is not pale.      Findings: No erythema or rash.   Neurological:      Mental Status: He is alert. Mental status is at baseline.      Deep Tendon Reflexes: Reflexes are normal and symmetric.   Psychiatric:         Mood and Affect: Mood and affect normal.         Behavior: Behavior normal.         Lines/Drains:  Lines/Drains/Airways       Active Status       None                        Additional Data:   Lab Results: I have reviewed the following results: CBC/BMP:   .     09/21/24  1401   WBC 5.66   HGB 10.9*   HCT 30.7*   PLT 46*   SODIUM 126*   K 3.9   CL 87*   CO2 18*   BUN 26*   CREATININE 4.07*   GLUC 67      Results from last 7 days   Lab Units 09/21/24  1401   WBC Thousand/uL 5.66   HEMOGLOBIN g/dL 10.9*   HEMATOCRIT % 30.7*   PLATELETS Thousands/uL 46*   SEGS PCT % 73   LYMPHO PCT % 20   MONO PCT % 7   EOS PCT % 0     Results from last 7 days   Lab Units 09/21/24  1401   SODIUM mmol/L 126*   POTASSIUM mmol/L 3.9   CHLORIDE mmol/L 87*   CO2 mmol/L 18*   BUN mg/dL 26*   CREATININE mg/dL 4.07*   ANION GAP mmol/L 21*   CALCIUM mg/dL 7.5*   ALBUMIN g/dL 2.6*   TOTAL BILIRUBIN mg/dL 3.07*    ALK PHOS U/L 310*   ALT U/L 111*   AST U/L 317*   GLUCOSE RANDOM mg/dL 67             Lab Results   Component Value Date    HGBA1C 5.1 10/21/2020           Imaging Review: Reviewed radiology reports from this admission including: chest xray.  Other Studies: EKG was reviewed.     Administrative Statements   I have spent a total time of 75 minutes in caring for this patient on the day of the visit/encounter including Diagnostic results.    ** Please Note: This note has been constructed using a voice recognition system. **

## 2024-09-22 ENCOUNTER — APPOINTMENT (INPATIENT)
Dept: ULTRASOUND IMAGING | Facility: HOSPITAL | Age: 59
DRG: 241 | End: 2024-09-22
Payer: COMMERCIAL

## 2024-09-22 PROBLEM — F10.20 ALCOHOL DEPENDENCE (HCC): Status: ACTIVE | Noted: 2024-05-11

## 2024-09-22 PROBLEM — D61.818 PANCYTOPENIA (HCC): Status: ACTIVE | Noted: 2024-09-22

## 2024-09-22 PROBLEM — E87.20 ACIDOSIS, METABOLIC: Status: ACTIVE | Noted: 2024-09-22

## 2024-09-22 LAB
AFP-TM SERPL-MCNC: 3.51 NG/ML (ref 0–9)
ALBUMIN SERPL BCG-MCNC: 2.4 G/DL (ref 3.5–5)
ALP SERPL-CCNC: 290 U/L (ref 34–104)
ALT SERPL W P-5'-P-CCNC: 102 U/L (ref 7–52)
ANION GAP SERPL CALCULATED.3IONS-SCNC: 9 MMOL/L (ref 4–13)
AST SERPL W P-5'-P-CCNC: 286 U/L (ref 13–39)
BACTERIA UR QL AUTO: NORMAL /HPF
BASOPHILS # BLD AUTO: 0.02 THOUSANDS/ΜL (ref 0–0.1)
BASOPHILS NFR BLD AUTO: 1 % (ref 0–1)
BILIRUB DIRECT SERPL-MCNC: 2.38 MG/DL (ref 0–0.2)
BILIRUB SERPL-MCNC: 4.19 MG/DL (ref 0.2–1)
BILIRUB UR QL STRIP: NEGATIVE
BUN SERPL-MCNC: 20 MG/DL (ref 5–25)
CALCIUM SERPL-MCNC: 6.9 MG/DL (ref 8.4–10.2)
CHLORIDE SERPL-SCNC: 98 MMOL/L (ref 96–108)
CHLORIDE UR-SCNC: 98 MMOL/L
CLARITY UR: CLEAR
CO2 SERPL-SCNC: 23 MMOL/L (ref 21–32)
COLOR UR: YELLOW
CREAT SERPL-MCNC: 2.42 MG/DL (ref 0.6–1.3)
CREAT UR-MCNC: 48 MG/DL
CREAT UR-MCNC: 50.1 MG/DL
EOSINOPHIL # BLD AUTO: 0.09 THOUSAND/ΜL (ref 0–0.61)
EOSINOPHIL NFR BLD AUTO: 2 % (ref 0–6)
ERYTHROCYTE [DISTWIDTH] IN BLOOD BY AUTOMATED COUNT: 15.7 % (ref 11.6–15.1)
FERRITIN SERPL-MCNC: 1478 NG/ML (ref 24–336)
GFR SERPL CREATININE-BSD FRML MDRD: 28 ML/MIN/1.73SQ M
GLUCOSE SERPL-MCNC: 127 MG/DL (ref 65–140)
GLUCOSE SERPL-MCNC: 70 MG/DL (ref 65–140)
GLUCOSE UR STRIP-MCNC: NEGATIVE MG/DL
HCT VFR BLD AUTO: 27.2 % (ref 36.5–49.3)
HGB BLD-MCNC: 9.5 G/DL (ref 12–17)
HGB UR QL STRIP.AUTO: ABNORMAL
IGA SERPL-MCNC: 488 MG/DL (ref 66–433)
IGG SERPL-MCNC: 822 MG/DL (ref 635–1741)
IGM SERPL-MCNC: 203 MG/DL (ref 45–281)
IMM GRANULOCYTES # BLD AUTO: 0.01 THOUSAND/UL (ref 0–0.2)
IMM GRANULOCYTES NFR BLD AUTO: 0 % (ref 0–2)
INR PPP: 0.9 (ref 0.85–1.19)
IRON SERPL-MCNC: 134 UG/DL (ref 50–212)
KETONES UR STRIP-MCNC: NEGATIVE MG/DL
LACTATE SERPL-SCNC: 0.6 MMOL/L (ref 0.5–2)
LEUKOCYTE ESTERASE UR QL STRIP: NEGATIVE
LYMPHOCYTES # BLD AUTO: 1.19 THOUSANDS/ΜL (ref 0.6–4.47)
LYMPHOCYTES NFR BLD AUTO: 29 % (ref 14–44)
MAGNESIUM SERPL-MCNC: 1.6 MG/DL (ref 1.9–2.7)
MCH RBC QN AUTO: 32.8 PG (ref 26.8–34.3)
MCHC RBC AUTO-ENTMCNC: 34.9 G/DL (ref 31.4–37.4)
MCV RBC AUTO: 94 FL (ref 82–98)
MICROALBUMIN UR-MCNC: 19.1 MG/L
MICROALBUMIN/CREAT 24H UR: 38 MG/G CREATININE (ref 0–30)
MONOCYTES # BLD AUTO: 0.26 THOUSAND/ΜL (ref 0.17–1.22)
MONOCYTES NFR BLD AUTO: 6 % (ref 4–12)
NEUTROPHILS # BLD AUTO: 2.5 THOUSANDS/ΜL (ref 1.85–7.62)
NEUTS SEG NFR BLD AUTO: 62 % (ref 43–75)
NITRITE UR QL STRIP: NEGATIVE
NON-SQ EPI CELLS URNS QL MICRO: NORMAL /HPF
NRBC BLD AUTO-RTO: 0 /100 WBCS
PH UR STRIP.AUTO: 5.5 [PH]
PLATELET # BLD AUTO: 35 THOUSANDS/UL (ref 149–390)
PMV BLD AUTO: 11.3 FL (ref 8.9–12.7)
POTASSIUM SERPL-SCNC: 3.7 MMOL/L (ref 3.5–5.3)
PROT SERPL-MCNC: 4.8 G/DL (ref 6.4–8.4)
PROT UR STRIP-MCNC: NEGATIVE MG/DL
PROTHROMBIN TIME: 12.9 SECONDS (ref 12.3–15)
RBC # BLD AUTO: 2.9 MILLION/UL (ref 3.88–5.62)
RBC #/AREA URNS AUTO: NORMAL /HPF
SODIUM 24H UR-SCNC: 85 MOL/L
SODIUM SERPL-SCNC: 130 MMOL/L (ref 135–147)
SP GR UR STRIP.AUTO: 1.01 (ref 1–1.03)
UIBC SERPL-MCNC: <55 UG/DL (ref 155–355)
UROBILINOGEN UR STRIP-ACNC: <2 MG/DL
WBC # BLD AUTO: 4.07 THOUSAND/UL (ref 4.31–10.16)
WBC #/AREA URNS AUTO: NORMAL /HPF

## 2024-09-22 PROCEDURE — 82784 ASSAY IGA/IGD/IGG/IGM EACH: CPT | Performed by: PHYSICIAN ASSISTANT

## 2024-09-22 PROCEDURE — 83935 ASSAY OF URINE OSMOLALITY: CPT | Performed by: INTERNAL MEDICINE

## 2024-09-22 PROCEDURE — 82570 ASSAY OF URINE CREATININE: CPT | Performed by: INTERNAL MEDICINE

## 2024-09-22 PROCEDURE — 85610 PROTHROMBIN TIME: CPT | Performed by: INTERNAL MEDICINE

## 2024-09-22 PROCEDURE — 80076 HEPATIC FUNCTION PANEL: CPT | Performed by: INTERNAL MEDICINE

## 2024-09-22 PROCEDURE — 82948 REAGENT STRIP/BLOOD GLUCOSE: CPT

## 2024-09-22 PROCEDURE — 82728 ASSAY OF FERRITIN: CPT | Performed by: PHYSICIAN ASSISTANT

## 2024-09-22 PROCEDURE — 85025 COMPLETE CBC W/AUTO DIFF WBC: CPT | Performed by: INTERNAL MEDICINE

## 2024-09-22 PROCEDURE — 86038 ANTINUCLEAR ANTIBODIES: CPT | Performed by: PHYSICIAN ASSISTANT

## 2024-09-22 PROCEDURE — 81001 URINALYSIS AUTO W/SCOPE: CPT | Performed by: INTERNAL MEDICINE

## 2024-09-22 PROCEDURE — 83735 ASSAY OF MAGNESIUM: CPT | Performed by: INTERNAL MEDICINE

## 2024-09-22 PROCEDURE — 83605 ASSAY OF LACTIC ACID: CPT

## 2024-09-22 PROCEDURE — 82043 UR ALBUMIN QUANTITATIVE: CPT | Performed by: INTERNAL MEDICINE

## 2024-09-22 PROCEDURE — 87205 SMEAR GRAM STAIN: CPT | Performed by: INTERNAL MEDICINE

## 2024-09-22 PROCEDURE — 80048 BASIC METABOLIC PNL TOTAL CA: CPT | Performed by: INTERNAL MEDICINE

## 2024-09-22 PROCEDURE — 82436 ASSAY OF URINE CHLORIDE: CPT | Performed by: INTERNAL MEDICINE

## 2024-09-22 PROCEDURE — 86803 HEPATITIS C AB TEST: CPT | Performed by: PHYSICIAN ASSISTANT

## 2024-09-22 PROCEDURE — 86704 HEP B CORE ANTIBODY TOTAL: CPT | Performed by: PHYSICIAN ASSISTANT

## 2024-09-22 PROCEDURE — 83550 IRON BINDING TEST: CPT | Performed by: PHYSICIAN ASSISTANT

## 2024-09-22 PROCEDURE — 76705 ECHO EXAM OF ABDOMEN: CPT

## 2024-09-22 PROCEDURE — 87340 HEPATITIS B SURFACE AG IA: CPT | Performed by: PHYSICIAN ASSISTANT

## 2024-09-22 PROCEDURE — 99233 SBSQ HOSP IP/OBS HIGH 50: CPT | Performed by: FAMILY MEDICINE

## 2024-09-22 PROCEDURE — 84300 ASSAY OF URINE SODIUM: CPT | Performed by: INTERNAL MEDICINE

## 2024-09-22 PROCEDURE — 83540 ASSAY OF IRON: CPT | Performed by: PHYSICIAN ASSISTANT

## 2024-09-22 PROCEDURE — 99255 IP/OBS CONSLTJ NEW/EST HI 80: CPT | Performed by: INTERNAL MEDICINE

## 2024-09-22 PROCEDURE — 81256 HFE GENE: CPT | Performed by: PHYSICIAN ASSISTANT

## 2024-09-22 PROCEDURE — 82105 ALPHA-FETOPROTEIN SERUM: CPT | Performed by: PHYSICIAN ASSISTANT

## 2024-09-22 PROCEDURE — 86705 HEP B CORE ANTIBODY IGM: CPT | Performed by: PHYSICIAN ASSISTANT

## 2024-09-22 PROCEDURE — 80074 ACUTE HEPATITIS PANEL: CPT | Performed by: FAMILY MEDICINE

## 2024-09-22 PROCEDURE — 94762 N-INVAS EAR/PLS OXIMTRY CONT: CPT

## 2024-09-22 RX ORDER — LANOLIN ALCOHOL/MO/W.PET/CERES
100 CREAM (GRAM) TOPICAL DAILY
Status: DISCONTINUED | OUTPATIENT
Start: 2024-09-22 | End: 2024-09-24 | Stop reason: HOSPADM

## 2024-09-22 RX ORDER — FOLIC ACID 1 MG/1
1 TABLET ORAL DAILY
Status: DISCONTINUED | OUTPATIENT
Start: 2024-09-22 | End: 2024-09-24 | Stop reason: HOSPADM

## 2024-09-22 RX ORDER — DEXTROSE MONOHYDRATE AND SODIUM CHLORIDE 5; .9 G/100ML; G/100ML
125 INJECTION, SOLUTION INTRAVENOUS CONTINUOUS
Status: DISCONTINUED | OUTPATIENT
Start: 2024-09-22 | End: 2024-09-23

## 2024-09-22 RX ADMIN — HEPARIN SODIUM 5000 UNITS: 5000 INJECTION INTRAVENOUS; SUBCUTANEOUS at 05:45

## 2024-09-22 RX ADMIN — PANTOPRAZOLE SODIUM 40 MG: 40 INJECTION, POWDER, FOR SOLUTION INTRAVENOUS at 08:58

## 2024-09-22 RX ADMIN — PANTOPRAZOLE SODIUM 40 MG: 40 INJECTION, POWDER, FOR SOLUTION INTRAVENOUS at 21:39

## 2024-09-22 RX ADMIN — THIAMINE HCL TAB 100 MG 100 MG: 100 TAB at 14:44

## 2024-09-22 RX ADMIN — Medication 1 TABLET: at 14:44

## 2024-09-22 RX ADMIN — Medication 1 SPRAY: at 21:44

## 2024-09-22 RX ADMIN — DEXTROSE AND SODIUM CHLORIDE 125 ML/HR: 5; .9 INJECTION, SOLUTION INTRAVENOUS at 11:37

## 2024-09-22 RX ADMIN — FOLIC ACID 1 MG: 1 TABLET ORAL at 14:44

## 2024-09-22 NOTE — UTILIZATION REVIEW
Initial Clinical Review    Admission: Date/Time/Statement:   Admission Orders (From admission, onward)       Ordered        09/21/24 1708  Inpatient Admission  Once                          Orders Placed This Encounter   Procedures    Inpatient Admission     Standing Status:   Standing     Number of Occurrences:   1     Order Specific Question:   Level of Care     Answer:   Med Surg [16]     Order Specific Question:   Estimated length of stay     Answer:   More than 2 Midnights     Order Specific Question:   Certification     Answer:   I certify that inpatient services are medically necessary for this patient for a duration of greater than two midnights. See H&P and MD Progress Notes for additional information about the patient's course of treatment.     ED Arrival Information       Expected   -    Arrival   9/21/2024 13:33    Acuity   Emergent              Means of arrival   Ambulance    Escorted by   [facility]    Service   Hospitalist    Admission type   Emergency              Arrival complaint   -             Chief Complaint   Patient presents with    Abdominal Pain     Presents via EMS for c/o nausea, vomiting, weakness x 3 days. Last drink was this morning.        Initial Presentation: 58 y.o. male presents to ED via  EMS with nausea, vomiting and poor appetite.  Denies abdominal pain.  PMH is  HTN, alcohol abuse and anxiety.  Ct scan unremarkable.   Labs reveal Bili 3,  elevated  LFT's.   Creatinine  4, baseline about  1.  Admit  Ip with  MONAE,  Elevated  LFT's  and plan is  nephrology consult, monitor labs,  GI consult, IVF  and pain control.         Anticipated Length of Stay/Certification Statement:  Patient will be admitted on an inpatient basis with an anticipated length of stay of greater than 2 midnights secondary to acute kidney injury.     Gi consult  Melena/anemia.  Start IV  protonix, aggressive  IVF.  Plan EGD  Mon  9/23.  Need alcohol abstinence.  Start  MVI.     Nephrology  consult  Continue   IVF.  Possible volume depletion, hold  ACE.   Sodium low  but improving.    Baseline creatinine  0.9.      ED Triage Vitals   Temperature Pulse Respirations Blood Pressure SpO2 Pain Score   09/21/24 1338 09/21/24 1335 09/21/24 1338 09/21/24 1335 09/21/24 1338 09/21/24 1901   98.5 °F (36.9 °C) 91 18 (!) 85/67 100 % No Pain     Weight (last 2 days)       Date/Time Weight    09/21/24 21:48:16 92.6 (204.15)    09/21/24 1338 92.6 (204.15)            Vital Signs (last 3 days)       Date/Time Temp Pulse Resp BP MAP (mmHg) SpO2 Calculated FIO2 (%) - Nasal Cannula Nasal Cannula O2 Flow Rate (L/min) O2 Device Patient Position - Orthostatic VS CIWA-Ar Total Pain    09/22/24 1248 -- -- -- -- -- 99 % -- -- None (Room air) -- -- --    09/22/24 0900 -- -- -- -- -- -- -- -- -- -- -- No Pain    09/22/24 08:56:26 98.9 °F (37.2 °C) 89 -- 115/73 87 97 % -- -- -- -- -- --    09/21/24 21:48:16 99.1 °F (37.3 °C) 86 18 108/70 83 96 % -- -- -- -- -- No Pain    09/21/24 1901 -- -- -- -- -- -- -- -- -- -- -- No Pain    09/21/24 18:19:23 98.5 °F (36.9 °C) 90 -- 98/61 73 96 % -- -- -- -- -- --    09/21/24 1645 -- 88 18 102/63 77 92 % -- -- None (Room air) Lying -- --    09/21/24 1600 -- 87 21 107/67 80 98 % -- -- None (Room air) Lying -- --    09/21/24 1530 -- 91 18 108/67 81 98 % -- -- None (Room air) Lying 3 --    09/21/24 1500 -- 85 18 106/67 83 92 % -- -- None (Room air) Lying -- --    09/21/24 1445 -- 82 20 112/63 81 97 % -- -- None (Room air) -- -- --    09/21/24 1430 -- 84 18 110/68 84 94 % -- -- None (Room air) -- -- --    09/21/24 1410 -- 81 19 87/55 -- 100 % 28 2 L/min Nasal cannula Lying -- --    09/21/24 1404 -- 82 19 87/54 -- 95 % 28 2 L/min Nasal cannula Lying -- --    09/21/24 1401 -- -- -- -- -- -- -- -- None (Room air) -- -- --    09/21/24 1400 -- 89 23 74/50 57 91 % -- -- None (Room air) Lying -- --    09/21/24 1345 -- 92 20 85/54 65 89 % -- -- None (Room air) Lying -- --    09/21/24 1338 98.5 °F (36.9 °C)  91 18 85/67 -- 100 % -- -- None (Room air) Lying -- --    09/21/24 1335 -- 91 -- 85/67 -- -- -- -- -- -- 4 --           CIWA-Ar Score       Row Name 09/21/24 1530 09/21/24 1335          CIWA-Ar    BP -- 85/67     Pulse -- 91     Nausea and Vomiting 1 1     Tactile Disturbances 0 0     Tremor 1 2     Auditory Disturbances 0 0     Paroxysmal Sweats 0 0     Visual Disturbances 0 0     Anxiety 1 1     Headache, Fullness in Head 0 0     Agitation 0 0     Orientation and Clouding of Sensorium 0 0     CIWA-Ar Total 3 4                     Pertinent Labs/Diagnostic Test Results:   Radiology:  CT head without contrast   Final Interpretation by Ben Corley MD (09/21 1539)      No acute intracranial abnormality.                  Workstation performed: [id number]         CT abdomen pelvis wo contrast   Final Interpretation by Ben Corley MD (09/21 1545)      No acute findings.      Workstation performed: [id number]         XR chest 1 view portable   Final Interpretation by Horacio Deleon MD (09/21 1427)      No acute cardiopulmonary disease.            Workstation performed: [id number]         US right upper quadrant    (Results Pending)           Results from last 7 days   Lab Units 09/22/24  0639 09/21/24  1401   WBC Thousand/uL 4.07* 5.66   HEMOGLOBIN g/dL 9.5* 10.9*   HEMATOCRIT % 27.2* 30.7*   PLATELETS Thousands/uL 35* 46*   TOTAL NEUT ABS Thousands/µL 2.50 4.04         Results from last 7 days   Lab Units 09/22/24  0458 09/21/24  1630 09/21/24  1401   SODIUM mmol/L 130* 126* 126*   POTASSIUM mmol/L 3.7 3.7 3.9   CHLORIDE mmol/L 98 93* 87*   CO2 mmol/L 23 17* 18*   ANION GAP mmol/L 9 16* 21*   BUN mg/dL 20 24 26*   CREATININE mg/dL 2.42* 3.57* 4.07*   EGFR ml/min/1.73sq m 28 17 15   CALCIUM mg/dL 6.9* 6.9* 7.5*   MAGNESIUM mg/dL 1.6*  --   --      Results from last 7 days   Lab Units 09/22/24  0458 09/21/24  1452 09/21/24  1401   AST U/L 286*  --  317*   ALT U/L 102*  --  111*   ALK PHOS U/L  290*  --  310*   TOTAL PROTEIN g/dL 4.8*  --  4.9*   ALBUMIN g/dL 2.4*  --  2.6*   TOTAL BILIRUBIN mg/dL 4.19*  --  3.07*   BILIRUBIN DIRECT mg/dL 2.38*  --   --    AMMONIA umol/L  --  14*  --          Results from last 7 days   Lab Units 09/22/24  0458 09/21/24  1630 09/21/24  1401   GLUCOSE RANDOM mg/dL 70 54* 67        Results from last 7 days   Lab Units 09/21/24  1701   PH ART  7.459*   PCO2 ART mm Hg 28.1*   PO2 ART mm Hg 82.2   HCO3 ART mmol/L 19.5*   BASE EXC ART mmol/L -3.4   O2 CONTENT ART mL/dL 14.1*   O2 HGB, ARTERIAL % 94.6   ABG SOURCE  Radial, Right     Results from last 7 days   Lab Units 09/21/24  1452   PH RAUL  7.296*   PCO2 RAUL mm Hg 18.7*   PO2 RAUL mm Hg 44.8   HCO3 RAUL mmol/L 8.9*   BASE EXC RAUL mmol/L -16.2   O2 CONTENT RAUL ml/dL 4.8   O2 HGB, VENOUS % 67.6             Results from last 7 days   Lab Units 09/21/24  1942 09/21/24  1624 09/21/24  1401   HS TNI 0HR ng/L  --   --  29   HS TNI 2HR ng/L  --  27  --    HSTNI D2 ng/L  --  -2  --    HS TNI 4HR ng/L 27  --   --    HSTNI D4 ng/L -2  --   --          Results from last 7 days   Lab Units 09/22/24  0458   PROTIME seconds 12.9   INR  0.90             Results from last 7 days   Lab Units 09/22/24  0639 09/21/24  1942 09/21/24  1711   LACTIC ACID mmol/L 0.6 2.7* 3.1*           Results from last 7 days   Lab Units 09/21/24  1401   LIPASE u/L 70                 Results from last 7 days   Lab Units 09/22/24  1140   CLARITY UA  Clear   COLOR UA  Yellow   SPEC GRAV UA  1.007   PH UA  5.5   GLUCOSE UA mg/dl Negative   KETONES UA mg/dl Negative   BLOOD UA  Small*   PROTEIN UA mg/dl Negative   NITRITE UA  Negative   BILIRUBIN UA  Negative   UROBILINOGEN UA (BE) mg/dl <2.0   LEUKOCYTES UA  Negative   WBC UA /hpf None Seen   RBC UA /hpf 1-2   BACTERIA UA /hpf Occasional   EPITHELIAL CELLS WET PREP /hpf None Seen   SODIUM UR  85   CREATININE UR mg/dL 48.0                 Results from last 7 days   Lab Units 09/21/24  1401   ETHANOL LVL mg/dL 43*    ACETAMINOPHEN LVL ug/mL <2*   SALICYLATE LVL mg/dL <5.0             ED Treatment-Medication Administration from 09/21/2024 1333 to 09/21/2024 1750         Date/Time Order Dose Route Action     09/21/2024 1341 ondansetron (FOR EMS ONLY) (ZOFRAN) 4 mg/2 mL injection 4 mg 0 mg Does not apply Given to EMS     09/21/2024 1359 sodium chloride 0.9 % bolus 1,000 mL 1,000 mL Intravenous New Bag     09/21/2024 1359 ondansetron (ZOFRAN) injection 4 mg 4 mg Intravenous Given     09/21/2024 1436 sodium chloride 0.9 % bolus 1,000 mL 1,000 mL Intravenous New Bag     09/21/2024 1715 sodium chloride 0.9 % infusion 125 mL/hr Intravenous New Bag          Present on Admission:   Elevated liver enzymes   MONAE (acute kidney injury) (HCC)   Essential hypertension   Anxiety   Alcohol dependence (HCC)      Admitting Diagnosis: Alcohol withdrawal (HCC) [F10.939]  Hyponatremia [E87.1]  Nausea & vomiting [R11.2]  Transaminitis [R74.01]  MONAE (acute kidney injury) (HCC) [N17.9]  Age/Sex: 58 y.o. male  Admission Orders:  Scheduled Medications:  folic acid, 1 mg, Oral, Daily  multivitamin-minerals, 1 tablet, Oral, Daily  pantoprazole, 40 mg, Intravenous, Q12H MIGUEL  thiamine, 100 mg, Oral, Daily      Continuous IV Infusions:  dextrose 5 % and sodium chloride 0.9 %, 125 mL/hr, Intravenous, Continuous      PRN Meds:  ondansetron, 4 mg, Intravenous, Q6H PRN  phenol, 1 spray, Mouth/Throat, Q2H PRN        IP CONSULT TO NEPHROLOGY  IP CONSULT TO GASTROENTEROLOGY    Network Utilization Review Department  ATTENTION: Please call with any questions or concerns to [phone] and carefully listen to the prompts so that you are directed to the right person. All voicemails are confidential.   For Discharge needs, contact Care Management DC Support Team at [phone] opt. [phone]  Send all requests for admission clinical reviews, approved or denied determinations and any other requests to dedicated fax number below belonging to the campus where the patient is  receiving treatment. List of dedicated fax numbers for the Facilities:  FACILITY NAME UR FAX NUMBER   ADMISSION DENIALS (Administrative/Medical Necessity) [phone]   DISCHARGE SUPPORT TEAM (NETWORK) [phone]   PARENT CHILD HEALTH (Maternity/NICU/Pediatrics) [phone]   [facility] [phone]   [facility] [phone]   [facility] [phone]   [facility] [phone]   Critical access hospital [phone]   [facility] [phone]   [facility] [phone]   [facility] [phone]   [facility] [phone]   [facility] [phone]   [facility] [phone]   [facility] [phone]

## 2024-09-22 NOTE — PROGRESS NOTES
Progress Note - Hospitalist   Name: Vladimir Delvalle 58 y.o. male I MRN: [id number]  Unit/Bed#: -01 I Date of Admission: 9/21/2024   Date of Service: 9/22/2024 I Hospital Day: 1    Assessment & Plan  MONAE (acute kidney injury) (HCC)  C/c : coming in with poor appetite, nausea/vomiting x 2-3 days, generalized weakness for 1 week  BL Creatinine 1, admission Cr 4.07  Suspect pre renal from significant volume depletion as above  continue withIV fluids  Repeat BMP in the morning  nephrology on board  Improving  Essential hypertension  Hold lisinopril in setting of acute kidney injury  BP was actually running on the lower side, on ivf  Monitor closely  Elevated liver enzymes  + with elevated LFTs  Likely related to alcohol dependence  CT noted e/o hepatic steatosis  RUQ US pending  Hepatitis panel pending  GI on board  No indication for prednisolone(discriminant function is less than 32)  Monitor closely  Anxiety  Continue Paxil  Alcohol dependence (HCC)  H/O alcohol dependence  States he has cut down but still 2-3 shots , 3 times a week  Last drink before coming into the hospital although he states he threw it all up  CIWA ordered  Thiamine/ folic acid  Hyponatremia  Sodium noted to be 126  Likely from volume depletion versus poor solute intake  Will continue IV fluids  Follow-up nephrology recommendations  Pancytopenia (HCC)  Likely related to alcohol related liver injury  Hold heparin products  Monitor Hb closely    VTE Pharmacologic Prophylaxis:   hole for low platelet count    Mobility:   Basic Mobility Inpatient Raw Score: 22  JH-HLM Goal: 7: Walk 25 feet or more  JH-HLM Achieved: 7: Walk 25 feet or more  JH-HLM Goal achieved. Continue to encourage appropriate mobility.    Patient Centered Rounds: I performed bedside rounds with nursing staff today.   Discussions with Specialists or Other Care Team Provider: GI, nephro    Education and Discussions with Family / Patient: patient    Current Length of Stay: 1  day(s)  Current Patient Status: Inpatient   Certification Statement: The patient will continue to require additional inpatient hospital stay due to needs ivf, further work up  Discharge Plan: TBD    Code Status: Level 1 - Full Code    Subjective   This am he continues to report significant generalized weakness  Has some nausea although improved since admission.  Some mild epigastric discomfort.  Also reported occasional melanotic stools.    Vitals:   Temp (24hrs), Av.8 °F (37.1 °C), Min:98.5 °F (36.9 °C), Max:99.1 °F (37.3 °C)    Temp:  [98.5 °F (36.9 °C)-99.1 °F (37.3 °C)] 98.9 °F (37.2 °C)  HR:  [81-92] 89  Resp:  [18-23] 18  BP: ()/(50-73) 115/73  SpO2:  [89 %-100 %] 97 %  Body mass index is 27.69 kg/m².     Input and Output Summary (last 24 hours):   No intake or output data in the 24 hours ending 24 1154    Physical Exam  Constitutional:       General: He is not in acute distress.     Appearance: He is normal weight. He is not toxic-appearing.   Eyes:      General: Scleral icterus present.   Cardiovascular:      Rate and Rhythm: Normal rate and regular rhythm.   Pulmonary:      Effort: Pulmonary effort is normal. No respiratory distress.      Breath sounds: Normal breath sounds.   Abdominal:      General: Abdomen is flat. There is distension.      Tenderness: There is no abdominal tenderness. There is no guarding.   Musculoskeletal:      Right lower leg: No edema.      Left lower leg: No edema.   Neurological:      General: No focal deficit present.      Mental Status: He is alert and oriented to person, place, and time.          Lines/Drains:  Lines/Drains/Airways       Active Status       None                      Results from last 7 days   Lab Units 24  0639   WBC Thousand/uL 4.07*   HEMOGLOBIN g/dL 9.5*   HEMATOCRIT % 27.2*   PLATELETS Thousands/uL 35*   SEGS PCT % 62   LYMPHO PCT % 29   MONO PCT % 6   EOS PCT % 2     Results from last 7 days   Lab Units 24  0458   SODIUM mmol/L  130*   POTASSIUM mmol/L 3.7   CHLORIDE mmol/L 98   CO2 mmol/L 23   BUN mg/dL 20   CREATININE mg/dL 2.42*   ANION GAP mmol/L 9   CALCIUM mg/dL 6.9*   ALBUMIN g/dL 2.4*   TOTAL BILIRUBIN mg/dL 4.19*   ALK PHOS U/L 290*   ALT U/L 102*   AST U/L 286*   GLUCOSE RANDOM mg/dL 70     Results from last 7 days   Lab Units 09/22/24  0458   INR  0.90             Results from last 7 days   Lab Units 09/22/24  0639 09/21/24  1942 09/21/24  1711   LACTIC ACID mmol/L 0.6 2.7* 3.1*       Recent Cultures (last 7 days):           Last 24 Hours Medication List:     Current Facility-Administered Medications:     dextrose 5 % and sodium chloride 0.9 % infusion, Continuous, Last Rate: 125 mL/hr (09/22/24 1137)    folic acid (FOLVITE) tablet 1 mg, Daily    multivitamin-minerals (CENTRUM) tablet 1 tablet, Daily    ondansetron (ZOFRAN) injection 4 mg, Q6H PRN    pantoprazole (PROTONIX) injection 40 mg, Q12H MIGUEL    phenol (CHLORASEPTIC) 1.4 % mucosal liquid 1 spray, Q2H PRN    thiamine tablet 100 mg, Daily    Administrative Statements   Today, Patient Was Seen By: Janes Puga MD      **Please Note: This note may have been constructed using a voice recognition system.**

## 2024-09-22 NOTE — ASSESSMENT & PLAN NOTE
Hold lisinopril in setting of acute kidney injury  BP was actually running on the lower side, on ivf  Monitor closely

## 2024-09-22 NOTE — ASSESSMENT & PLAN NOTE
Patient's sodium was low but is getting better.  Likely because of IV fluid.  Hyponatremia was secondary to inadequate solute intake and volume depletion.  Will check urine study as part of the workup

## 2024-09-22 NOTE — CONSULTS
Consultation -  [facility]  Vladimir Delvalle 58 y.o. male MRN: [id number]  Unit/Bed#: -01 Encounter: [id number]      Inpatient consult to gastroenterology  Consult performed by: Zonia Penn PA-C  Consult ordered by: Janes Puga MD          Reason for Consult / Principal Problem: Transaminitis/alcohol abuse/melena    HPI: Vladimir Delvalle is a 58 y.o. year old male who presents with past medical history significant for hypertension and alcohol abuse.    Patient presents to [facility] emergency department yesterday with a chief complaint of nausea, vomiting, decreased appetite, and melenic stool.    GI consulted today for elevated liver enzymes.  Patient reports that for the past several days he overall had not been feeling well.  He reports that last week he had several days of black tarry stool.  Patient does report a history of alcohol consumption.  He reports that he has cut back significantly on his alcohol consumption but continues to drink on a daily basis.    Patient reports that he has never been told that he had underlying liver problem in the past.  Patient denies any history of hepatitis.  Patient denies any family history of liver disease.    Imaging in the emergency department does show evidence of hepatomegaly with severe hepatic steatosis.    As far as patient's black stool is concerned he denies any over-the-counter NSAID use.  He has never had an upper endoscopy in the past.  He has never been diagnosed with an ulcer in the past.    Patient did have a colonoscopy performed in 2020 which showed left-sided diverticular disease.    REVIEW OF SYSTEMS:     CONSTITUTIONAL: Denies any fever, chills, or rigors. Good appetite, and no recent weight loss.  HEENT: No earache or tinnitus. Denies hearing loss or visual disturbances.  CARDIOVASCULAR: No chest pain or palpitations.   RESPIRATORY: Denies any cough, hemoptysis, shortness of breath or dyspnea on  exertion.  GASTROINTESTINAL: As noted in the History of Present Illness.   GENITOURINARY: No problems with urination. Denies any hematuria or dysuria.  NEUROLOGIC: No dizziness or vertigo, denies headaches.   MUSCULOSKELETAL: Denies any muscle or joint pain.   SKIN: Denies skin rashes or itching.   ENDOCRINE: Denies excessive thirst. Denies intolerance to heat or cold.  PSYCHOSOCIAL: Denies depression or anxiety. Denies any recent memory loss.     Historical Information   Past Medical History:   Diagnosis Date    Hypertension     Pneumonia      Past Surgical History:   Procedure Laterality Date    FINGER SURGERY Left     index    KNEE ARTHROSCOPY Left     LUNG SURGERY Left      Social History   Social History     Substance and Sexual Activity   Alcohol Use Yes    Alcohol/week: 7.0 standard drinks of alcohol    Types: 7 Standard drinks or equivalent per week     Social History     Substance and Sexual Activity   Drug Use Never     Social History     Tobacco Use   Smoking Status Former    Current packs/day: 0.50    Average packs/day: 0.5 packs/day for 5.0 years (2.5 ttl pk-yrs)    Types: Cigarettes   Smokeless Tobacco Never     Family History   Problem Relation Age of Onset    Lymphoma Mother     Skin cancer Father     Pancreatic cancer Maternal Grandmother        Meds/Allergies       Medications Prior to Admission:     albuterol (Proventil HFA) 90 mcg/act inhaler    aspirin 81 mg chewable tablet    levothyroxine 25 mcg tablet    lisinopril (ZESTRIL) 20 mg tablet    ondansetron (ZOFRAN) 4 mg tablet    PARoxetine (PAXIL) 20 mg tablet    Current Facility-Administered Medications:     dextrose 5 % and sodium chloride 0.9 % infusion, Continuous    heparin (porcine) subcutaneous injection 5,000 Units, Q8H MIGUEL    ondansetron (ZOFRAN) injection 4 mg, Q6H PRN    pantoprazole (PROTONIX) injection 40 mg, Q12H MIGUEL    phenol (CHLORASEPTIC) 1.4 % mucosal liquid 1 spray, Q2H PRN    No Known Allergies    Objective   Blood pressure  115/73, pulse 89, temperature 98.9 °F (37.2 °C), resp. rate 18, height 6' (1.829 m), weight 92.6 kg (204 lb 2.3 oz), SpO2 97%.  No intake or output data in the 24 hours ending 09/22/24 1121      PHYSICAL EXAM:      General Appearance:   Alert, cooperative, no distress, appears stated age    HEENT:   Normocephalic, atraumatic, anicteric.     Neck:  Supple, symmetrical, trachea midline, no adenopathy;    thyroid: no enlargement/tenderness/nodules; no carotid  bruit or JVD    Lungs:   Clear to auscultation bilaterally; no rales, rhonchi or wheezing; respirations unlabored    Heart::   S1 and S2 normal; regular rate and rhythm; no murmur, rub, or gallop.   Abdomen:   Soft, non-tender, non-distended; normal bowel sounds; no masses, no organomegaly    Genitalia:   Deferred    Rectal:   Deferred    Extremities:  No cyanosis, clubbing or edema    Pulses:  2+ and symmetric all extremities    Skin:  Skin color, texture, turgor normal, no rashes or lesions    Lymph nodes:  No palpable cervical, axillary or inguinal lymphadenopathy        Lab Results:   Admission on 09/21/2024   Component Date Value    Ventricular Rate 09/21/2024 104     Atrial Rate 09/21/2024 104     MT Interval 09/21/2024 170     QRSD Interval 09/21/2024 82     QT Interval 09/21/2024 396     QTC Interval 09/21/2024 520     P Axis 09/21/2024 55     QRS Axis 09/21/2024 61     T Wave [place] 09/21/2024 37     WBC 09/21/2024 5.66     RBC 09/21/2024 3.27 (L)     Hemoglobin 09/21/2024 10.9 (L)     Hematocrit 09/21/2024 30.7 (L)     MCV 09/21/2024 94     MCH 09/21/2024 33.3     MCHC 09/21/2024 35.5     RDW 09/21/2024 15.8 (H)     MPV 09/21/2024 12.0     Platelets 09/21/2024 46 (L)     nRBC 09/21/2024 0     Segmented % 09/21/2024 73     Immature Grans % 09/21/2024 0     Lymphocytes % 09/21/2024 20     Monocytes % 09/21/2024 7     Eosinophils Relative 09/21/2024 0     Basophils Relative 09/21/2024 0     Absolute Neutrophils 09/21/2024 4.04     Absolute Immature Grans  09/21/2024 0.02     Absolute Lymphocytes 09/21/2024 1.15     Absolute Monocytes 09/21/2024 0.41     Eosinophils Absolute 09/21/2024 0.02     Basophils Absolute 09/21/2024 0.02     Sodium 09/21/2024 126 (L)     Potassium 09/21/2024 3.9     Chloride 09/21/2024 87 (L)     CO2 09/21/2024 18 (L)     ANION GAP 09/21/2024 21 (H)     BUN 09/21/2024 26 (H)     Creatinine 09/21/2024 4.07 (H)     Glucose 09/21/2024 67     Calcium 09/21/2024 7.5 (L)     Corrected Calcium 09/21/2024 8.6     AST 09/21/2024 317 (H)     ALT 09/21/2024 111 (H)     Alkaline Phosphatase 09/21/2024 310 (H)     Total Protein 09/21/2024 4.9 (L)     Albumin 09/21/2024 2.6 (L)     Total Bilirubin 09/21/2024 3.07 (H)     eGFR 09/21/2024 15     Lipase 09/21/2024 70     hs TnI 0hr 09/21/2024 29     ABO Grouping 09/21/2024 O     Rh Factor 09/21/2024 Positive     Antibody Screen 09/21/2024 Negative     Specimen Expiration Date 09/21/2024 [id number]     Ethanol Lvl 09/21/2024 43 (H)     Salicylate Lvl 09/21/2024 <5.0     Acetaminophen Level 09/21/2024 <2 (L)     ABO Grouping 09/21/2024 O     Rh Factor 09/21/2024 Positive     hs TnI 2hr 09/21/2024 27     Delta 2hr hsTnI 09/21/2024 -2     Ammonia 09/21/2024 14 (L)     pH, Evaristo 09/21/2024 7.296 (L)     pCO2, Evaristo 09/21/2024 18.7 (LL)     pO2, Evaristo 09/21/2024 44.8     HCO3, Evaristo 09/21/2024 8.9 (L)     Base Excess, Evaristo 09/21/2024 -16.2     O2 Content, Evaristo 09/21/2024 4.8     O2 HGB, VENOUS 09/21/2024 67.6     RBC Morphology 09/21/2024 Normal     Platelet Estimate 09/21/2024 Decreased (A)     hs TnI 4hr 09/21/2024 27     Delta 4hr hsTnI 09/21/2024 -2     Sodium 09/21/2024 126 (L)     Potassium 09/21/2024 3.7     Chloride 09/21/2024 93 (L)     CO2 09/21/2024 17 (L)     ANION GAP 09/21/2024 16 (H)     BUN 09/21/2024 24     Creatinine 09/21/2024 3.57 (H)     Glucose 09/21/2024 54 (L)     Calcium 09/21/2024 6.9 (L)     eGFR 09/21/2024 17     pH, Arterial 09/21/2024 7.459 (H)     pCO2, Arterial 09/21/2024 28.1 (L)     pO2,  Arterial 09/21/2024 82.2     HCO3, Arterial 09/21/2024 19.5 (L)     Base Excess, Arterial 09/21/2024 -3.4     O2 Content, Arterial 09/21/2024 14.1 (L)     O2 HGB,Arterial  09/21/2024 94.6     SOURCE 09/21/2024 Radial, Right     REINALDO TEST 09/21/2024 Yes     LACTIC ACID 09/21/2024 3.1 (H)     LACTIC ACID 09/21/2024 2.7 (H)     Sodium 09/22/2024 130 (L)     Potassium 09/22/2024 3.7     Chloride 09/22/2024 98     CO2 09/22/2024 23     ANION GAP 09/22/2024 9     BUN 09/22/2024 20     Creatinine 09/22/2024 2.42 (H)     Glucose 09/22/2024 70     Calcium 09/22/2024 6.9 (L)     eGFR 09/22/2024 28     Magnesium 09/22/2024 1.6 (L)     Total Bilirubin 09/22/2024 4.19 (H)     Bilirubin, Direct 09/22/2024 2.38 (H)     Alkaline Phosphatase 09/22/2024 290 (H)     AST 09/22/2024 286 (H)     ALT 09/22/2024 102 (H)     Total Protein 09/22/2024 4.8 (L)     Albumin 09/22/2024 2.4 (L)     WBC 09/22/2024 4.07 (L)     RBC 09/22/2024 2.90 (L)     Hemoglobin 09/22/2024 9.5 (L)     Hematocrit 09/22/2024 27.2 (L)     MCV 09/22/2024 94     MCH 09/22/2024 32.8     MCHC 09/22/2024 34.9     RDW 09/22/2024 15.7 (H)     MPV 09/22/2024 11.3     Platelets 09/22/2024 35 (L)     nRBC 09/22/2024 0     Segmented % 09/22/2024 62     Immature Grans % 09/22/2024 0     Lymphocytes % 09/22/2024 29     Monocytes % 09/22/2024 6     Eosinophils Relative 09/22/2024 2     Basophils Relative 09/22/2024 1     Absolute Neutrophils 09/22/2024 2.50     Absolute Immature Grans 09/22/2024 0.01     Absolute Lymphocytes 09/22/2024 1.19     Absolute Monocytes 09/22/2024 0.26     Eosinophils Absolute 09/22/2024 0.09     Basophils Absolute 09/22/2024 0.02     Protime 09/22/2024 12.9     INR 09/22/2024 0.90     LACTIC ACID 09/22/2024 0.6        Imaging Studies: I have personally reviewed pertinent imaging studies.        ASSESSMENT & PLAN:  Transaminitis  Alcohol abuse  Melena  -Patient presents to [facility] emergency department with a chief complaint of poor appetite,  nausea, vomiting, melena.  -Patient does report daily alcohol consumption.  -CTAP from admission shows evidence of hepatic steatosis.  -LFTs from 9/22/2024 showed total bilirubin of 4.19, direct bilirubin 2.38, alk phos 290, , .  -Patient's discriminant function is less than 32 no indication for prednisolone therapy.  -Will plan full liver workup.  -Right upper quadrant ultrasound pending.  -Complete alcohol cessation.  -[facility] protocol.  -Multivitamin, folate, thiamine.  -As far as patient's melena is concerned we will keep patient n.p.o. midnight and perform EGD tomorrow.  -Continue pantoprazole 40 mg twice daily.  -Serial abdominal exams  -Supportive care  Diet as tolerated today.    Patient be seen and examined by Dr. Starkey.  All [facility] medical decision made by Dr. Starkey.  Case discussed with slim.      Zonia Penn PA-C  9/22/2024,11:21 AM

## 2024-09-22 NOTE — ASSESSMENT & PLAN NOTE
Patient had metabolic acidosis on admission.  2 ABG revealed alkalosis likely because of component to the respiratory alkalosis.  Patient is feeling much better.  Patient may have likely ketoacidosis because of starvation and alcohol intake.  Will monitor closely with IV fluid

## 2024-09-22 NOTE — ED PROVIDER NOTES
1. Hyponatremia    2. Nausea & vomiting    3. MONAE (acute kidney injury) (HCC)    4. Transaminitis    5. Alcohol use disorder      ED Disposition       None          Assessment & Plan       Medical Decision Making  Amount and/or Complexity of Data Reviewed  Labs: ordered.  Radiology: ordered.    Risk  Prescription drug management.    EKG: rate 104 sinus tachycardia with pvcs. No prior ekgs.     Differential alcohol misuse, dehydration, gastroenteritis, gastritis    Patient is a 58-year-old male present emerged department no acute respiratory distress and vital signs show tachycardia and hypotension.  Patient appears unwell.  Initial blood pressure require close monitoring and IV hydration.     Labs showed hyponatremia and an anion gap.  Likely secondary to ketosis.  Patient had improvement of symptoms with fluids given.  New MONAE.  Transaminitis present as well.  Discussed case with nephrology who recommended ABG and a repeat BMP for further management.  Imaging is nonsuggestive of a infectious process.  Discussed case with Slim he will be admitting.       Medications   ondansetron (ZOFRAN) injection 4 mg (has no administration in time range)   pantoprazole (PROTONIX) injection 40 mg (has no administration in time range)   ondansetron (FOR EMS ONLY) (ZOFRAN) 4 mg/2 mL injection 4 mg (0 mg Does not apply Given to EMS 9/21/24 1341)   sodium chloride 0.9 % bolus 1,000 mL (0 mL Intravenous Stopped 9/21/24 1436)   ondansetron (ZOFRAN) injection 4 mg (4 mg Intravenous Given 9/21/24 1359)   sodium chloride 0.9 % bolus 1,000 mL (0 mL Intravenous Stopped 9/21/24 1656)       History of Present Illness       HPI    Patient is a 58-year-old male present emerged department for nausea vomiting diarrhea.  Patient's been weak for several days.  His last drink was early this morning.  Patient was prompted to come to the ER off of concerns of a friend.  Patient was exhibiting generalized weakness and had difficulty ambulating.  Reports  not eating or drinking for several days as he was unable to keep fluids down.  Denies any fevers chills.  Denies any change in bladder habits.  History includes alcohol use and hypertension.    Review of Systems   Constitutional:  Negative for chills and fever.   HENT:  Negative for ear pain and sore throat.    Eyes:  Negative for pain and visual disturbance.   Respiratory:  Negative for cough and shortness of breath.    Cardiovascular:  Negative for chest pain and palpitations.   Gastrointestinal:  Positive for diarrhea, nausea and vomiting. Negative for abdominal pain.   Genitourinary:  Negative for dysuria and hematuria.   Musculoskeletal:  Negative for arthralgias and back pain.   Skin:  Negative for color change and rash.   Neurological:  Positive for weakness. Negative for seizures and syncope.   All other systems reviewed and are negative.          Objective     ED Triage Vitals   Temperature Pulse Blood Pressure Respirations SpO2 Patient Position - Orthostatic VS   09/21/24 1338 09/21/24 1335 09/21/24 1335 09/21/24 1338 09/21/24 1338 09/21/24 1338   98.5 °F (36.9 °C) 91 (!) 85/67 18 100 % Lying      Temp Source Heart Rate Source BP Location FiO2 (%) Pain Score    09/21/24 1338 09/21/24 1338 09/21/24 1338 -- 09/21/24 1901    Oral Monitor Right arm  No Pain        Physical Exam    Labs Reviewed   CBC AND DIFFERENTIAL - Abnormal       Result Value    WBC 5.66      RBC 3.27 (*)     Hemoglobin 10.9 (*)     Hematocrit 30.7 (*)     MCV 94      MCH 33.3      MCHC 35.5      RDW 15.8 (*)     MPV 12.0      Platelets 46 (*)     nRBC 0      Segmented % 73      Immature Grans % 0      Lymphocytes % 20      Monocytes % 7      Eosinophils Relative 0      Basophils Relative 0      Absolute Neutrophils 4.04      Absolute Immature Grans 0.02      Absolute Lymphocytes 1.15      Absolute Monocytes 0.41      Eosinophils Absolute 0.02      Basophils Absolute 0.02      Narrative:     This is an appended report.  These results have  been appended to a previously verified report.   COMPREHENSIVE METABOLIC PANEL - Abnormal    Sodium 126 (*)     Potassium 3.9      Chloride 87 (*)     CO2 18 (*)     ANION GAP 21 (*)     BUN 26 (*)     Creatinine 4.07 (*)     Glucose 67      Calcium 7.5 (*)     Corrected Calcium 8.6       (*)      (*)     Alkaline Phosphatase 310 (*)     Total Protein 4.9 (*)     Albumin 2.6 (*)     Total Bilirubin 3.07 (*)     eGFR 15      Narrative:     Specimen Lipemic.Specimen Icteric.  National Kidney Disease Foundation guidelines for Chronic Kidney Disease (CKD):     Stage 1 with normal or high GFR (GFR > 90 mL/min/1.73 square meters)    Stage 2 Mild CKD (GFR = 60-89 mL/min/1.73 square meters)    Stage 3A Moderate CKD (GFR = 45-59 mL/min/1.73 square meters)    Stage 3B Moderate CKD (GFR = 30-44 mL/min/1.73 square meters)    Stage 4 Severe CKD (GFR = 15-29 mL/min/1.73 square meters)    Stage 5 End Stage CKD (GFR <15 mL/min/1.73 square meters)  Note: GFR calculation is accurate only with a steady state creatinine   MEDICAL ALCOHOL - Abnormal    Ethanol Lvl 43 (*)    ACETAMINOPHEN LEVEL - Abnormal    Acetaminophen Level <2 (*)     Narrative:     Specimen Lipemic.Specimen Icteric.   AMMONIA - Abnormal    Ammonia 14 (*)    BLOOD GAS, VENOUS - Abnormal    pH, Evaristo 7.296 (*)     pCO2, Evaristo 18.7 (*)     pO2, Evaristo 44.8      HCO3, Evaristo 8.9 (*)     Base Excess, Evaristo -16.2      O2 Content, Evaristo 4.8      O2 HGB, VENOUS 67.6     BASIC METABOLIC PANEL - Abnormal    Sodium 126 (*)     Potassium 3.7      Chloride 93 (*)     CO2 17 (*)     ANION GAP 16 (*)     BUN 24      Creatinine 3.57 (*)     Glucose 54 (*)     Calcium 6.9 (*)     eGFR 17      Narrative:     Specimen Lipemic.  National Kidney Disease Foundation guidelines for Chronic Kidney Disease (CKD):     Stage 1 with normal or high GFR (GFR > 90 mL/min/1.73 square meters)    Stage 2 Mild CKD (GFR = 60-89 mL/min/1.73 square meters)    Stage 3A Moderate CKD (GFR = 45-59  mL/min/1.73 square meters)    Stage 3B Moderate CKD (GFR = 30-44 mL/min/1.73 square meters)    Stage 4 Severe CKD (GFR = 15-29 mL/min/1.73 square meters)    Stage 5 End Stage CKD (GFR <15 mL/min/1.73 square meters)  Note: GFR calculation is accurate only with a steady state creatinine   BLOOD GAS, ARTERIAL - Abnormal    pH, Arterial 7.459 (*)     pCO2, Arterial 28.1 (*)     pO2, Arterial 82.2      HCO3, Arterial 19.5 (*)     Base Excess, Arterial -3.4      O2 Content, Arterial 14.1 (*)     O2 HGB,Arterial  94.6      SOURCE Radial, Right      REINALDO TEST Yes     LACTIC ACID, PLASMA (W/REFLEX IF RESULT > 2.0) - Abnormal    LACTIC ACID 3.1 (*)     Narrative:     Specimen Lipemic.  Result may be elevated if tourniquet was used during collection.   LACTIC ACID 2 HOUR - Abnormal    LACTIC ACID 2.7 (*)     Narrative:     Specimen Lipemic.  Result may be elevated if tourniquet was used during collection.   BASIC METABOLIC PANEL - Abnormal    Sodium 130 (*)     Potassium 3.7      Chloride 98      CO2 23      ANION GAP 9      BUN 20      Creatinine 2.42 (*)     Glucose 70      Calcium 6.9 (*)     eGFR 28      Narrative:     National Kidney Disease Foundation guidelines for Chronic Kidney Disease (CKD):     Stage 1 with normal or high GFR (GFR > 90 mL/min/1.73 square meters)    Stage 2 Mild CKD (GFR = 60-89 mL/min/1.73 square meters)    Stage 3A Moderate CKD (GFR = 45-59 mL/min/1.73 square meters)    Stage 3B Moderate CKD (GFR = 30-44 mL/min/1.73 square meters)    Stage 4 Severe CKD (GFR = 15-29 mL/min/1.73 square meters)    Stage 5 End Stage CKD (GFR <15 mL/min/1.73 square meters)  Note: GFR calculation is accurate only with a steady state creatinine   MAGNESIUM - Abnormal    Magnesium 1.6 (*)    HEPATIC FUNCTION PANEL - Abnormal    Total Bilirubin 4.19 (*)     Bilirubin, Direct 2.38 (*)     Alkaline Phosphatase 290 (*)      (*)      (*)     Total Protein 4.8 (*)     Albumin 2.4 (*)    CBC AND DIFFERENTIAL -  Abnormal    WBC 4.07 (*)     RBC 2.90 (*)     Hemoglobin 9.5 (*)     Hematocrit 27.2 (*)     MCV 94      MCH 32.8      MCHC 34.9      RDW 15.7 (*)     MPV 11.3      Platelets 35 (*)     nRBC 0      Segmented % 62      Immature Grans % 0      Lymphocytes % 29      Monocytes % 6      Eosinophils Relative 2      Basophils Relative 1      Absolute Neutrophils 2.50      Absolute Immature Grans 0.01      Absolute Lymphocytes 1.19      Absolute Monocytes 0.26      Eosinophils Absolute 0.09      Basophils Absolute 0.02     ALBUMIN / CREATININE URINE RATIO - Abnormal    Creatinine, Ur 50.1      Albumin,U,Random 19.1      Albumin Creat Ratio 38 (*)    URINALYSIS WITH REFLEX TO SCOPE - Abnormal    Color, UA Yellow      Clarity, UA Clear      Specific Gravity, UA 1.007      pH, UA 5.5      Leukocytes, UA Negative      Nitrite, UA Negative      Protein, UA Negative      Glucose, UA Negative      Ketones, UA Negative      Urobilinogen, UA <2.0      Bilirubin, UA Negative      Occult Blood, UA Small (*)    IGG, IGA, IGM - Abnormal     (*)                TIBC PANEL (INCL. IRON, TIBC, % IRON SATURATION) - Abnormal    Iron Saturation        TIBC        Iron 134      UIBC <55 (*)    FERRITIN - Abnormal    Ferritin 1,478 (*)    BASIC METABOLIC PANEL - Abnormal    Sodium 133 (*)     Potassium 3.6      Chloride 97      CO2 28      ANION GAP 8      BUN 8      Creatinine 1.25      Glucose 105      Calcium 7.7 (*)     eGFR 63      Narrative:     National Kidney Disease Foundation guidelines for Chronic Kidney Disease (CKD):     Stage 1 with normal or high GFR (GFR > 90 mL/min/1.73 square meters)    Stage 2 Mild CKD (GFR = 60-89 mL/min/1.73 square meters)    Stage 3A Moderate CKD (GFR = 45-59 mL/min/1.73 square meters)    Stage 3B Moderate CKD (GFR = 30-44 mL/min/1.73 square meters)    Stage 4 Severe CKD (GFR = 15-29 mL/min/1.73 square meters)    Stage 5 End Stage CKD (GFR <15 mL/min/1.73 square meters)  Note: GFR  calculation is accurate only with a steady state creatinine   CBC AND DIFFERENTIAL - Abnormal    WBC 5.14      RBC 3.09 (*)     Hemoglobin 9.9 (*)     Hematocrit 29.8 (*)     MCV 96      MCH 32.0      MCHC 33.2      RDW 15.9 (*)     MPV 11.8      Platelets 33 (*)     nRBC 0      Segmented % 66      Immature Grans % 1      Lymphocytes % 27      Monocytes % 5      Eosinophils Relative 1      Basophils Relative 0      Absolute Neutrophils 3.39      Absolute Immature Grans 0.03      Absolute Lymphocytes 1.37      Absolute Monocytes 0.27      Eosinophils Absolute 0.06      Basophils Absolute 0.02     PHOSPHORUS - Abnormal    Phosphorus 2.1 (*)    VITAMIN D 25 HYDROXY - Abnormal    Vit D, 25-Hydroxy 16.9 (*)    HEPATIC FUNCTION PANEL - Abnormal    Total Bilirubin 4.66 (*)     Bilirubin, Direct 2.76 (*)     Alkaline Phosphatase 396 (*)      (*)      (*)     Total Protein 5.4 (*)     Albumin 2.6 (*)    SMEAR REVIEW(PHLEBS DO NOT ORDER) - Abnormal    RBC Morphology Normal      Platelet Estimate Decreased (*)    LIPASE - Normal    Lipase 70      Narrative:     Specimen Lipemic.Specimen Icteric.   HS TROPONIN I 0HR - Normal    hs TnI 0hr 29     SALICYLATE LEVEL - Normal    Salicylate Lvl <5.0      Narrative:     Specimen Lipemic.Specimen Icteric.   HS TROPONIN I 2HR - Normal    hs TnI 2hr 27      Delta 2hr hsTnI -2     HS TROPONIN I 4HR - Normal    hs TnI 4hr 27      Delta 4hr hsTnI -2     PROTIME-INR - Normal    Protime 12.9      INR 0.90      Narrative:     INR Therapeutic Range    Indication                                             INR Range      Atrial Fibrillation                                               2.0-3.0  Hypercoagulable State                                    2.0.2.3  Left Ventricular Asist Device                            2.0-3.0  Mechanical Heart Valve                                  -    Aortic(with afib, MI, embolism, HF, LA enlargement,    and/or coagulopathy)                                      2.0-3.0 (2.5-3.5)     Mitral                                                             2.5-3.5  Prosthetic/Bioprosthetic Heart Valve               2.0-3.0  Venous thromboembolism (VTE: VT, PE        2.0-3.0   LACTIC ACID, PLASMA (W/REFLEX IF RESULT > 2.0) - Normal    LACTIC ACID 0.6      Narrative:     Result may be elevated if tourniquet was used during collection.   HEPATITIS PANEL, ACUTE - Normal    Hepatitis B Surface Ag Non-reactive      Hep A IgM Non-reactive      Hepatitis C Ab Non-reactive      Hep B C IgM Non-reactive     OSMOLALITY, URINE - Normal    Osmolality, Ur 282     AFP TUMOR MARKER - Normal    AFP TUMOR MARKER 3.51      Narrative:     [employer] chemiluminescent immunoassay. Results cannot be interpreted for the presence or absence of malignant disease. Confirm baseline values for patients being serially monitored.      GEOFF SCREEN W/ REFLEX TO TITER/PATTERN - Normal    GEOFF Negative      Narrative:     Test performed on the [employer] system using multiplex flow immunoassay methodology.  A negative GEOFF Screen (or indeterminate for dsDNA) reflects antibodies absent or below the cut-off values for all the following analytes: dsDNA, Chromatin, Ribosomal P, SS-A, SS-B, Sm, SmRNP, RNP,  Scl-70, Cate-1 and Centromere B.     The GEOFF Screen result should be interpreted in conjunction with other laboratory test results and clinical presentation of the patient in the diagnosis for autoimmune disease.    SLE patients undergoing steroid or immunosuppressant therapy may have negative test results.       CHRONIC HEPATITIS PANEL - Normal    Hepatitis B Surface Ag Non-reactive      Hepatitis C Ab Non-reactive      Hep B C IgM Non-reactive      Hep B Core Total Ab Non-reactive     URINE MICROSCOPIC - Normal    RBC, UA 1-2      WBC, UA None Seen      Epithelial Cells None Seen      Bacteria, UA Occasional     PTH, INTACT - Normal    PTH 30.3     URIC ACID - Normal    Uric Acid 5.0       Narrative:     N-acetyl-p-benzoquinone imine (metabolite of Acetaminophen) will generate erroneously low results in samples for patients that have taken an overdose of Acetaminophen.   POCT GLUCOSE - Normal    POC Glucose 127     CHLORIDE, URINE, RANDOM    Chloride, Ur 98     CREATININE, URINE, RANDOM    Creatinine, Ur 48.0     SODIUM, URINE, RANDOM    Sodium, Ur 85     URINE,EOSINOPHILS    Eosinophil, Urine 0     HEMOCHROMATOSIS MUTATION   ALPHA-1-ANTITRYPSIN   ANTIMITOCHONDRIAL ANTIBODY   ANTI-SMOOTH MUSCLE ANTIBODY, IGG   CERULOPLASMIN   FERRITIN   TYPE AND SCREEN    ABO Grouping O      Rh Factor Positive      Antibody Screen Negative      Specimen Expiration Date 20240924     ABORH RECHECK    ABO Grouping O      Rh Factor Positive     PREPARE LEUKOREDUCED PLATELET PHERESIS    Unit Product Code Z4419H81      Unit Number [id number]      Unit ABO O      Unit RH POS      Unit Dispense Status Issued      Unit Product Volume 236     COMA PANEL    Narrative:     The following orders were created for panel order Coma Panel.  Procedure                               Abnormality         Status                     ---------                               -----------         ------                     Ethanol[[id number]]                      Abnormal            Final result               Salicylate level[[id number]]             Normal              Final result               Acetaminophen level-If c...[[id number]]  Abnormal            Final result                 Please view results for these tests on the individual orders.   IRON PANEL (INCLUDES FERRITIN,IRON SAT%, IRON, AND TIBC)    Narrative:     The following orders were created for panel order Iron Panel (Includes Ferritin, Iron Sat%, Iron, and TIBC).  Procedure                               Abnormality         Status                     ---------                               -----------         ------                     TIBC Panel (incl. Iron, ...[[id number]]   Abnormal            Final result               Ferritin[[id number]]                     Abnormal            Final result                 Please view results for these tests on the individual orders.     US right upper quadrant   Final Interpretation by Bertram Dow MD (09/22 1543)      Hepatic steatosis and trace perihepatic ascites.      Workstation performed: [id number]         CT head without contrast   Final Interpretation by Ben Corley MD (09/21 1539)      No acute intracranial abnormality.                  Workstation performed: [id number]         CT abdomen pelvis wo contrast   Final Interpretation by Ben Corley MD (09/21 1545)      No acute findings.      Workstation performed: [id number]         XR chest 1 view portable   Final Interpretation by Horacio Deleon MD (09/21 1427)      No acute cardiopulmonary disease.            Workstation performed: [id number]             Procedures    ED Medication and Procedure Management   Prior to Admission Medications   Prescriptions Last Dose Informant Patient Reported? Taking?   PARoxetine (PAXIL) 20 mg tablet   No No   Sig: Take 1 tablet (20 mg total) by mouth daily   albuterol (Proventil HFA) 90 mcg/act inhaler   No No   Sig: Inhale 2 puffs every 6 (six) hours as needed for wheezing   aspirin 81 mg chewable tablet  Self Yes No   Sig: Chew 81 mg daily   levothyroxine 25 mcg tablet   No No   Sig: Take 1 tablet (25 mcg total) by mouth daily in the early morning   lisinopril (ZESTRIL) 20 mg tablet   No No   Sig: TAKE 1 TABLET BY MOUTH EVERY DAY   ondansetron (ZOFRAN) 4 mg tablet   No No   Sig: Take 1 tablet (4 mg total) by mouth every 8 (eight) hours as needed for nausea or vomiting      Facility-Administered Medications: None     Current Discharge Medication List        CONTINUE these medications which have NOT CHANGED    Details   albuterol (Proventil HFA) 90 mcg/act inhaler Inhale 2 puffs every 6 (six) hours as needed for  wheezing  Qty: 6.7 g, Refills: 5    Comments: Substitution to a formulary equivalent within the same pharmaceutical class is authorized.  Associated Diagnoses: Upper respiratory tract infection, unspecified type      aspirin 81 mg chewable tablet Chew 81 mg daily      levothyroxine 25 mcg tablet Take 1 tablet (25 mcg total) by mouth daily in the early morning  Qty: 100 tablet, Refills: 3    Associated Diagnoses: Hypothyroidism, unspecified type      lisinopril (ZESTRIL) 20 mg tablet TAKE 1 TABLET BY MOUTH EVERY DAY  Qty: 90 tablet, Refills: 1    Associated Diagnoses: Essential hypertension      ondansetron (ZOFRAN) 4 mg tablet Take 1 tablet (4 mg total) by mouth every 8 (eight) hours as needed for nausea or vomiting  Qty: 20 tablet, Refills: 0    Associated Diagnoses: Nausea      PARoxetine (PAXIL) 20 mg tablet Take 1 tablet (20 mg total) by mouth daily  Qty: 90 tablet, Refills: 1    Associated Diagnoses: Anxiety           No discharge procedures on file.     Chey Walters DO  09/23/24 3232

## 2024-09-22 NOTE — ASSESSMENT & PLAN NOTE
+ with elevated LFTs  Likely related to alcohol dependence  CT noted e/o hepatic steatosis  RUQ US pending  Hepatitis panel pending  GI on board  No indication for prednisolone(discriminant function is less than 32)  Monitor closely

## 2024-09-22 NOTE — PLAN OF CARE
Problem: INFECTION - ADULT  Goal: Absence or prevention of progression during hospitalization  Description: INTERVENTIONS:  - Assess and monitor for signs and symptoms of infection  - Monitor lab/diagnostic results  - Monitor all insertion sites, i.e. indwelling lines, tubes, and drains  - Monitor endotracheal if appropriate and nasal secretions for changes in amount and color  - [place] appropriate cooling/warming therapies per order  - Administer medications as ordered  - Instruct and encourage patient and family to use good hand hygiene technique  - Identify and instruct in appropriate isolation precautions for identified infection/condition  9/21/2024 2206 by Jefferson Husain RN  Outcome: Progressing  9/21/2024 2206 by Jefferson Husain RN  Outcome: Progressing

## 2024-09-22 NOTE — ASSESSMENT & PLAN NOTE
C/c : coming in with poor appetite, nausea/vomiting x 2-3 days, generalized weakness for 1 week  BL Creatinine 1, admission Cr 4.07  Suspect pre renal from significant volume depletion as above  continue withIV fluids  Repeat BMP in the morning  nephrology on board  Improving

## 2024-09-22 NOTE — ASSESSMENT & PLAN NOTE
Possible volume depletion as patient was not eating or drinking for a while and has nausea vomiting.  Patient was on ACE inhibitor as a part of the treatment for hypertension.  It is on hold right now.  Patient was hypotensive on admission that may have contributed for acute kidney injury    Everything explained to the patient.  At this point I will continue IV fluid.  Will add D5 W as part of nutrition and monitor closely    We also urine electrolytes urinalysis

## 2024-09-22 NOTE — CONSULTS
Consultation - Nephrology   Name: Vladimir Delvalle 58 y.o. male I MRN: [id number]  Unit/Bed#: -01 I Date of Admission: 9/21/2024   Date of Service: 9/22/2024 I Hospital Day: 1   Inpatient consult to Nephrology  Consult performed by: Nickolas Anderson MD  Consult ordered by: Francisco Levi MD        Physician Requesting Evaluation: Janes Puga MD   Reason for Evaluation / Principal Problem: Acute kidney injury    Assessment & Plan  MONAE (acute kidney injury) (HCC)  Possible volume depletion as patient was not eating or drinking for a while and has nausea vomiting.  Patient was on ACE inhibitor as a part of the treatment for hypertension.  It is on hold right now.  Patient was hypotensive on admission that may have contributed for acute kidney injury    Everything explained to the patient.  At this point I will continue IV fluid.  Will add D5 W as part of nutrition and monitor closely    We also urine electrolytes urinalysis  Acidosis, metabolic  Patient had metabolic acidosis on admission.  2 ABG revealed alkalosis likely because of component to the respiratory alkalosis.  Patient is feeling much better.  Patient may have likely ketoacidosis because of starvation and alcohol intake.  Will monitor closely with IV fluid  Hyponatremia  Patient's sodium was low but is getting better.  Likely because of IV fluid.  Hyponatremia was secondary to inadequate solute intake and volume depletion.  Will check urine study as part of the workup  Essential hypertension  Patient was hypotensive at admission likely because of volume depletion.  It is getting better.  Will hold off ACE inhibitor for now  Elevated liver enzymes  Likely because of alcohol intake.  Does have hepatomegaly.  Ultrasound to be done      I have reviewed the nephrology recommendations including need for IV fluid and close monitoring, with Slim, and we are in agreement with renal plan including the information outlined above.     History of Present Illness    Vladimir Delvalle is a 58 y.o. male who was admitted to hospital after presenting with nausea vomiting and inability to eat. A renal consultation is requested today for assistance in the management of acute kidney injury and hyponatremia.    Patient with history of hypertension came to the hospital with inability to eat for 2 to 3 weeks.  Patient has nausea and vomiting    Patient has a history of alcohol abuse though claims he was not drinking anything until yesterday    Denies taking an nostra painkiller    Denies abdominal discomfort    No chest pain no palpitation    Denies any urinary complaint for now    Patient had a history of hypertension was on ACE inhibitor in the past    Review of Systems   Constitutional:  Positive for fatigue.   HENT:  Negative for congestion.    Eyes:  Negative for photophobia and pain.   Respiratory:  Negative for chest tightness and shortness of breath.    Cardiovascular:  Negative for chest pain and palpitations.   Gastrointestinal:  Positive for abdominal pain, nausea and vomiting. Negative for abdominal distention and blood in stool.   Endocrine: Negative for polydipsia.   Genitourinary:  Negative for difficulty urinating, dysuria, flank pain, hematuria and urgency.   Musculoskeletal:  Negative for arthralgias and back pain.   Skin:  Negative for rash.   Neurological:  Positive for weakness. Negative for dizziness, light-headedness and headaches.   Hematological:  Does not bruise/bleed easily.   Psychiatric/Behavioral:  Negative for behavioral problems. The patient is not nervous/anxious.        Patient baseline creatinine is 0.9 which was done couple of months ago  Objective      Temp:  [98.5 °F (36.9 °C)-99.1 °F (37.3 °C)] 98.9 °F (37.2 °C)  HR:  [81-92] 89  Resp:  [18-23] 18  BP: ()/(50-73) 115/73  O2 Device: None (Room air)         Vitals:    09/21/24 1338 09/21/24 2148   Weight: 92.6 kg (204 lb 2.3 oz) 92.6 kg (204 lb 2.3 oz)     No intake/output data  recorded.  Lines/Drains/Airways       Active Status       None                  Physical Exam  Constitutional:       General: He is not in acute distress.     Appearance: He is well-developed.   HENT:      Head: Normocephalic.      Mouth/Throat:      Mouth: Mucous membranes are moist.   Eyes:      General: No scleral icterus.     Conjunctiva/sclera: Conjunctivae normal.   Neck:      Vascular: No JVD.   Cardiovascular:      Rate and Rhythm: Normal rate.      Heart sounds: Normal heart sounds.   Pulmonary:      Effort: Pulmonary effort is normal.      Breath sounds: No wheezing.   Abdominal:      General: There is distension.      Palpations: Abdomen is soft.      Tenderness: There is abdominal tenderness.   Musculoskeletal:         General: Normal range of motion.      Cervical back: Neck supple.   Skin:     General: Skin is warm.      Findings: No rash.   Neurological:      Mental Status: He is alert and oriented to person, place, and time.   Psychiatric:         Behavior: Behavior normal.        Current Weight: Weight - Scale: 92.6 kg (204 lb 2.3 oz)  First Weight: Weight - Scale: 92.6 kg (204 lb 2.3 oz)    Medications:    Current Facility-Administered Medications:     dextrose 5 % and sodium chloride 0.9 % infusion, 125 mL/hr, Intravenous, Continuous, Nickolas Anderson MD, Last Rate: 125 mL/hr at 09/22/24 1137, 125 mL/hr at 09/22/24 1137    heparin (porcine) subcutaneous injection 5,000 Units, 5,000 Units, Subcutaneous, [facility]8H [facility], 5,000 Units at 09/22/24 0545    ondansetron (ZOFRAN) injection 4 mg, 4 mg, Intravenous, Q6H PRN, Francisco Levi MD    pantoprazole (PROTONIX) injection 40 mg, 40 mg, Intravenous, Q12H [facility], 40 mg at 09/22/24 0858    phenol (CHLORASEPTIC) 1.4 % mucosal liquid 1 spray, 1 spray, Mouth/Throat, Q2H PRN, BEL Agustin, 1 spray at 09/21/24 2203      Lab Results: I have reviewed the following results:   Results from last 7 days   Lab Units 09/22/24  0639  "09/22/24  0458 09/21/24  1630 09/21/24  1401   WBC Thousand/uL 4.07*  --   --  5.66   HEMOGLOBIN g/dL 9.5*  --   --  10.9*   HEMATOCRIT % 27.2*  --   --  30.7*   PLATELETS Thousands/uL 35*  --   --  46*   POTASSIUM mmol/L  --  3.7 3.7 3.9   CHLORIDE mmol/L  --  98 93* 87*   CO2 mmol/L  --  23 17* 18*   BUN mg/dL  --  20 24 26*   CREATININE mg/dL  --  2.42* 3.57* 4.07*   CALCIUM mg/dL  --  6.9* 6.9* 7.5*   MAGNESIUM mg/dL  --  1.6*  --   --    ALBUMIN g/dL  --  2.4*  --  2.6*       Administrative Statements     Portions of the record may have been created with voice recognition software. Occasional wrong word or \"sound a like\" substitutions may have occurred due to the inherent limitations of voice recognition software. Read the chart carefully and recognize, using context, where substitutions have occurred.If you have any questions, please contact the dictating provider.  "

## 2024-09-23 ENCOUNTER — APPOINTMENT (INPATIENT)
Dept: GASTROENTEROLOGY | Facility: HOSPITAL | Age: 59
DRG: 241 | End: 2024-09-23
Payer: COMMERCIAL

## 2024-09-23 ENCOUNTER — ANESTHESIA (INPATIENT)
Dept: GASTROENTEROLOGY | Facility: HOSPITAL | Age: 59
DRG: 241 | End: 2024-09-23
Payer: COMMERCIAL

## 2024-09-23 ENCOUNTER — ANESTHESIA EVENT (INPATIENT)
Dept: GASTROENTEROLOGY | Facility: HOSPITAL | Age: 59
DRG: 241 | End: 2024-09-23
Payer: COMMERCIAL

## 2024-09-23 LAB
25(OH)D3 SERPL-MCNC: 16.9 NG/ML (ref 30–100)
ALBUMIN SERPL BCG-MCNC: 2.6 G/DL (ref 3.5–5)
ALP SERPL-CCNC: 396 U/L (ref 34–104)
ALT SERPL W P-5'-P-CCNC: 103 U/L (ref 7–52)
ANA SER QL IA: NEGATIVE
ANION GAP SERPL CALCULATED.3IONS-SCNC: 8 MMOL/L (ref 4–13)
AST SERPL W P-5'-P-CCNC: 258 U/L (ref 13–39)
BASOPHILS # BLD AUTO: 0.02 THOUSANDS/ΜL (ref 0–0.1)
BASOPHILS NFR BLD AUTO: 0 % (ref 0–1)
BILIRUB DIRECT SERPL-MCNC: 2.76 MG/DL (ref 0–0.2)
BILIRUB SERPL-MCNC: 4.66 MG/DL (ref 0.2–1)
BUN SERPL-MCNC: 8 MG/DL (ref 5–25)
CALCIUM SERPL-MCNC: 7.7 MG/DL (ref 8.4–10.2)
CHLORIDE SERPL-SCNC: 97 MMOL/L (ref 96–108)
CO2 SERPL-SCNC: 28 MMOL/L (ref 21–32)
CREAT SERPL-MCNC: 1.25 MG/DL (ref 0.6–1.3)
EOSINOPHIL # BLD AUTO: 0.06 THOUSAND/ΜL (ref 0–0.61)
EOSINOPHIL NFR BLD AUTO: 1 % (ref 0–6)
EOSINOPHIL NFR URNS MANUAL: 0 %
ERYTHROCYTE [DISTWIDTH] IN BLOOD BY AUTOMATED COUNT: 15.9 % (ref 11.6–15.1)
ERYTHROCYTE [DISTWIDTH] IN BLOOD BY AUTOMATED COUNT: 16 % (ref 11.6–15.1)
FERRITIN SERPL-MCNC: 1484 NG/ML (ref 24–336)
GFR SERPL CREATININE-BSD FRML MDRD: 63 ML/MIN/1.73SQ M
GLUCOSE SERPL-MCNC: 105 MG/DL (ref 65–140)
HAV IGM SER QL: NORMAL
HBV CORE AB SER QL: NORMAL
HBV CORE IGM SER QL: NORMAL
HBV CORE IGM SER QL: NORMAL
HBV SURFACE AG SER QL: NORMAL
HBV SURFACE AG SER QL: NORMAL
HCT VFR BLD AUTO: 29.4 % (ref 36.5–49.3)
HCT VFR BLD AUTO: 29.8 % (ref 36.5–49.3)
HCV AB SER QL: NORMAL
HCV AB SER QL: NORMAL
HGB BLD-MCNC: 9.9 G/DL (ref 12–17)
HGB BLD-MCNC: 9.9 G/DL (ref 12–17)
IMM GRANULOCYTES # BLD AUTO: 0.03 THOUSAND/UL (ref 0–0.2)
IMM GRANULOCYTES NFR BLD AUTO: 1 % (ref 0–2)
LYMPHOCYTES # BLD AUTO: 1.37 THOUSANDS/ΜL (ref 0.6–4.47)
LYMPHOCYTES NFR BLD AUTO: 27 % (ref 14–44)
MCH RBC QN AUTO: 32 PG (ref 26.8–34.3)
MCH RBC QN AUTO: 32.5 PG (ref 26.8–34.3)
MCHC RBC AUTO-ENTMCNC: 33.2 G/DL (ref 31.4–37.4)
MCHC RBC AUTO-ENTMCNC: 33.7 G/DL (ref 31.4–37.4)
MCV RBC AUTO: 96 FL (ref 82–98)
MCV RBC AUTO: 96 FL (ref 82–98)
MONOCYTES # BLD AUTO: 0.27 THOUSAND/ΜL (ref 0.17–1.22)
MONOCYTES NFR BLD AUTO: 5 % (ref 4–12)
NEUTROPHILS # BLD AUTO: 3.39 THOUSANDS/ΜL (ref 1.85–7.62)
NEUTS SEG NFR BLD AUTO: 66 % (ref 43–75)
NRBC BLD AUTO-RTO: 0 /100 WBCS
OSMOLALITY UR: 282 MMOL/KG
PHOSPHATE SERPL-MCNC: 2.1 MG/DL (ref 2.7–4.5)
PLATELET # BLD AUTO: 33 THOUSANDS/UL (ref 149–390)
PLATELET # BLD AUTO: 85 THOUSANDS/UL (ref 149–390)
PMV BLD AUTO: 10.4 FL (ref 8.9–12.7)
PMV BLD AUTO: 11.8 FL (ref 8.9–12.7)
POTASSIUM SERPL-SCNC: 3.6 MMOL/L (ref 3.5–5.3)
PROT SERPL-MCNC: 5.4 G/DL (ref 6.4–8.4)
PTH-INTACT SERPL-MCNC: 30.3 PG/ML (ref 12–88)
RBC # BLD AUTO: 3.05 MILLION/UL (ref 3.88–5.62)
RBC # BLD AUTO: 3.09 MILLION/UL (ref 3.88–5.62)
SODIUM SERPL-SCNC: 133 MMOL/L (ref 135–147)
URATE SERPL-MCNC: 5 MG/DL (ref 3.5–8.5)
WBC # BLD AUTO: 5.14 THOUSAND/UL (ref 4.31–10.16)
WBC # BLD AUTO: 6.56 THOUSAND/UL (ref 4.31–10.16)

## 2024-09-23 PROCEDURE — 82103 ALPHA-1-ANTITRYPSIN TOTAL: CPT | Performed by: PHYSICIAN ASSISTANT

## 2024-09-23 PROCEDURE — 86381 MITOCHONDRIAL ANTIBODY EACH: CPT | Performed by: PHYSICIAN ASSISTANT

## 2024-09-23 PROCEDURE — 99232 SBSQ HOSP IP/OBS MODERATE 35: CPT | Performed by: FAMILY MEDICINE

## 2024-09-23 PROCEDURE — 43239 EGD BIOPSY SINGLE/MULTIPLE: CPT | Performed by: INTERNAL MEDICINE

## 2024-09-23 PROCEDURE — 94762 N-INVAS EAR/PLS OXIMTRY CONT: CPT

## 2024-09-23 PROCEDURE — 82390 ASSAY OF CERULOPLASMIN: CPT | Performed by: PHYSICIAN ASSISTANT

## 2024-09-23 PROCEDURE — 30233R1 TRANSFUSION OF NONAUTOLOGOUS PLATELETS INTO PERIPHERAL VEIN, PERCUTANEOUS APPROACH: ICD-10-PCS | Performed by: FAMILY MEDICINE

## 2024-09-23 PROCEDURE — 99232 SBSQ HOSP IP/OBS MODERATE 35: CPT | Performed by: INTERNAL MEDICINE

## 2024-09-23 PROCEDURE — 88305 TISSUE EXAM BY PATHOLOGIST: CPT | Performed by: PATHOLOGY

## 2024-09-23 PROCEDURE — 85025 COMPLETE CBC W/AUTO DIFF WBC: CPT | Performed by: INTERNAL MEDICINE

## 2024-09-23 PROCEDURE — 86015 ACTIN ANTIBODY EACH: CPT | Performed by: PHYSICIAN ASSISTANT

## 2024-09-23 PROCEDURE — P9035 PLATELET PHERES LEUKOREDUCED: HCPCS

## 2024-09-23 PROCEDURE — 0DB68ZX EXCISION OF STOMACH, VIA NATURAL OR ARTIFICIAL OPENING ENDOSCOPIC, DIAGNOSTIC: ICD-10-PCS | Performed by: INTERNAL MEDICINE

## 2024-09-23 PROCEDURE — 80048 BASIC METABOLIC PNL TOTAL CA: CPT | Performed by: INTERNAL MEDICINE

## 2024-09-23 PROCEDURE — 80076 HEPATIC FUNCTION PANEL: CPT | Performed by: FAMILY MEDICINE

## 2024-09-23 PROCEDURE — 84550 ASSAY OF BLOOD/URIC ACID: CPT | Performed by: INTERNAL MEDICINE

## 2024-09-23 PROCEDURE — 85027 COMPLETE CBC AUTOMATED: CPT | Performed by: PHYSICIAN ASSISTANT

## 2024-09-23 PROCEDURE — 82728 ASSAY OF FERRITIN: CPT | Performed by: PHYSICIAN ASSISTANT

## 2024-09-23 PROCEDURE — 82306 VITAMIN D 25 HYDROXY: CPT | Performed by: INTERNAL MEDICINE

## 2024-09-23 PROCEDURE — 83970 ASSAY OF PARATHORMONE: CPT | Performed by: INTERNAL MEDICINE

## 2024-09-23 PROCEDURE — 84100 ASSAY OF PHOSPHORUS: CPT | Performed by: INTERNAL MEDICINE

## 2024-09-23 RX ORDER — SODIUM CHLORIDE, SODIUM LACTATE, POTASSIUM CHLORIDE, CALCIUM CHLORIDE 600; 310; 30; 20 MG/100ML; MG/100ML; MG/100ML; MG/100ML
INJECTION, SOLUTION INTRAVENOUS CONTINUOUS PRN
Status: DISCONTINUED | OUTPATIENT
Start: 2024-09-23 | End: 2024-09-23

## 2024-09-23 RX ORDER — SODIUM CHLORIDE, SODIUM LACTATE, POTASSIUM CHLORIDE, CALCIUM CHLORIDE 600; 310; 30; 20 MG/100ML; MG/100ML; MG/100ML; MG/100ML
100 INJECTION, SOLUTION INTRAVENOUS CONTINUOUS
Status: DISCONTINUED | OUTPATIENT
Start: 2024-09-23 | End: 2024-09-23

## 2024-09-23 RX ORDER — LIDOCAINE HYDROCHLORIDE 20 MG/ML
INJECTION, SOLUTION EPIDURAL; INFILTRATION; INTRACAUDAL; PERINEURAL AS NEEDED
Status: DISCONTINUED | OUTPATIENT
Start: 2024-09-23 | End: 2024-09-23

## 2024-09-23 RX ORDER — SODIUM CHLORIDE, SODIUM LACTATE, POTASSIUM CHLORIDE, CALCIUM CHLORIDE 600; 310; 30; 20 MG/100ML; MG/100ML; MG/100ML; MG/100ML
100 INJECTION, SOLUTION INTRAVENOUS ONCE
Status: COMPLETED | OUTPATIENT
Start: 2024-09-23 | End: 2024-09-24

## 2024-09-23 RX ORDER — PAROXETINE 20 MG/1
20 TABLET, FILM COATED ORAL DAILY
Status: DISCONTINUED | OUTPATIENT
Start: 2024-09-23 | End: 2024-09-24 | Stop reason: HOSPADM

## 2024-09-23 RX ORDER — SODIUM CHLORIDE 9 MG/ML
100 INJECTION, SOLUTION INTRAVENOUS CONTINUOUS
Status: DISCONTINUED | OUTPATIENT
Start: 2024-09-23 | End: 2024-09-24

## 2024-09-23 RX ORDER — ERGOCALCIFEROL 1.25 MG/1
50000 CAPSULE, LIQUID FILLED ORAL WEEKLY
Status: DISCONTINUED | OUTPATIENT
Start: 2024-09-23 | End: 2024-09-24 | Stop reason: HOSPADM

## 2024-09-23 RX ORDER — PROPOFOL 10 MG/ML
INJECTION, EMULSION INTRAVENOUS AS NEEDED
Status: DISCONTINUED | OUTPATIENT
Start: 2024-09-23 | End: 2024-09-23

## 2024-09-23 RX ADMIN — PROPOFOL 50 MG: 10 INJECTION, EMULSION INTRAVENOUS at 15:12

## 2024-09-23 RX ADMIN — DEXTROSE AND SODIUM CHLORIDE 125 ML/HR: 5; .9 INJECTION, SOLUTION INTRAVENOUS at 01:06

## 2024-09-23 RX ADMIN — FOLIC ACID 1 MG: 1 TABLET ORAL at 09:21

## 2024-09-23 RX ADMIN — PAROXETINE 20 MG: 20 TABLET, FILM COATED ORAL at 10:39

## 2024-09-23 RX ADMIN — ERGOCALCIFEROL 50000 UNITS: 1.25 CAPSULE, LIQUID FILLED ORAL at 10:39

## 2024-09-23 RX ADMIN — THIAMINE HCL TAB 100 MG 100 MG: 100 TAB at 09:20

## 2024-09-23 RX ADMIN — SODIUM CHLORIDE 100 ML/HR: 0.9 INJECTION, SOLUTION INTRAVENOUS at 12:36

## 2024-09-23 RX ADMIN — SODIUM CHLORIDE, SODIUM LACTATE, POTASSIUM CHLORIDE, AND CALCIUM CHLORIDE: .6; .31; .03; .02 INJECTION, SOLUTION INTRAVENOUS at 15:04

## 2024-09-23 RX ADMIN — PROPOFOL 120 MG: 10 INJECTION, EMULSION INTRAVENOUS at 15:08

## 2024-09-23 RX ADMIN — PANTOPRAZOLE SODIUM 40 MG: 40 INJECTION, POWDER, FOR SOLUTION INTRAVENOUS at 21:47

## 2024-09-23 RX ADMIN — PROPOFOL 30 MG: 10 INJECTION, EMULSION INTRAVENOUS at 15:10

## 2024-09-23 RX ADMIN — DEXTROSE AND SODIUM CHLORIDE 125 ML/HR: 5; .9 INJECTION, SOLUTION INTRAVENOUS at 10:49

## 2024-09-23 RX ADMIN — ONDANSETRON 4 MG: 2 INJECTION INTRAMUSCULAR; INTRAVENOUS at 18:27

## 2024-09-23 RX ADMIN — LIDOCAINE HYDROCHLORIDE 100 MG: 20 INJECTION, SOLUTION EPIDURAL; INFILTRATION; INTRACAUDAL; PERINEURAL at 15:08

## 2024-09-23 RX ADMIN — Medication 1 TABLET: at 09:20

## 2024-09-23 RX ADMIN — PANTOPRAZOLE SODIUM 40 MG: 40 INJECTION, POWDER, FOR SOLUTION INTRAVENOUS at 09:21

## 2024-09-23 RX ADMIN — SODIUM CHLORIDE, SODIUM LACTATE, POTASSIUM CHLORIDE, AND CALCIUM CHLORIDE 100 ML/HR: .6; .31; .03; .02 INJECTION, SOLUTION INTRAVENOUS at 14:48

## 2024-09-23 NOTE — PROGRESS NOTES
Progress Note - Nephrology   Name: Vladimir Delvalle 58 y.o. male I MRN: [id number]  Unit/Bed#: -01 I Date of Admission: 9/21/2024   Date of Service: 9/23/2024 I Hospital Day: 2     Assessment & Plan  MONAE (acute kidney injury) (HCC)  Improved with hydration.  Seems to be at baseline.  Will continue hydration for now and monitor    Will change IV fluid to normal saline in view of hyponatremia     Hyponatremia  Improving for now.  As a mention above will change IV fluid to normal saline and monitor  Essential hypertension  Blood pressure is improving.  Will continue to monitor  Elevated liver enzymes  Being monitored and worked up by GI    I have reviewed the nephrology recommendations including hydration, with Slim, and we are in agreement with renal plan including the information outlined above.     History of Present Illness   Brief History of Admission -acute kidney injury    Getting better    No acute complaint    No chest pain no palpitation    Denies any shortness of breath    No nausea no vomiting    Objective      Temp:  [97.5 °F (36.4 °C)-99.4 °F (37.4 °C)] 98.3 °F (36.8 °C)  HR:  [70-95] 84  Resp:  [16-18] 16  BP: (105-131)/() 131/81  O2 Device: None (Room air)         Vitals:    09/21/24 1338 09/21/24 2148   Weight: 92.6 kg (204 lb 2.3 oz) 92.6 kg (204 lb 2.3 oz)     I/O last 24 hours:  In: 0   Out: 600 [Urine:600]  Lines/Drains/Airways       Active Status       None                  Physical Exam  Constitutional:       General: He is not in acute distress.     Appearance: He is well-developed.   HENT:      Head: Normocephalic.      Mouth/Throat:      Mouth: Mucous membranes are moist.   Eyes:      General: No scleral icterus.     Conjunctiva/sclera: Conjunctivae normal.   Neck:      Vascular: No JVD.   Cardiovascular:      Rate and Rhythm: Normal rate.      Heart sounds: Normal heart sounds.   Pulmonary:      Effort: Pulmonary effort is normal.      Breath sounds: No wheezing.   Abdominal:       Palpations: Abdomen is soft.      Tenderness: There is no abdominal tenderness.   Musculoskeletal:         General: Normal range of motion.      Cervical back: Neck supple.   Skin:     General: Skin is warm.      Findings: No rash.   Neurological:      Mental Status: He is alert and oriented to person, place, and time.   Psychiatric:         Behavior: Behavior normal.        Medications:    Current Facility-Administered Medications:     dextrose 5 % and sodium chloride 0.9 % infusion, 125 mL/hr, Intravenous, Continuous, Nickolas Anderson MD, Last Rate: 125 mL/hr at 09/23/24 1049, 125 mL/hr at 09/23/24 1049    ergocalciferol (VITAMIN D2) capsule 50,000 Units, 50,000 Units, Oral, Weekly, Nickolas Anderson MD, 50,000 Units at 09/23/24 1039    folic acid (FOLVITE) tablet 1 mg, 1 mg, Oral, Daily, Janes Puga MD, 1 mg at 09/23/24 0921    multivitamin-minerals (CENTRUM) tablet 1 tablet, 1 tablet, Oral, Daily, Janes Puga MD, 1 tablet at 09/23/24 0920    ondansetron (ZOFRAN) injection 4 mg, 4 mg, Intravenous, Q6H PRN, Francisco Levi MD    pantoprazole (PROTONIX) injection 40 mg, 40 mg, Intravenous, Q12H MIGUEL, Francisco Levi MD, 40 mg at 09/23/24 0921    PARoxetine (PAXIL) tablet 20 mg, 20 mg, Oral, Daily, Janes Puga MD, 20 mg at 09/23/24 1039    phenol (CHLORASEPTIC) 1.4 % mucosal liquid 1 spray, 1 spray, Mouth/Throat, Q2H PRN, BEL Agustin, 1 spray at 09/22/24 2144    thiamine tablet 100 mg, 100 mg, Oral, Daily, Janes Puga MD, 100 mg at 09/23/24 0920      Lab Results: I have reviewed the following results:   Results from last 7 days   Lab Units 09/23/24  0450 09/22/24  0639 09/22/24  0458 09/21/24  1630 09/21/24  1401   WBC Thousand/uL 5.14 4.07*  --   --  5.66   HEMOGLOBIN g/dL 9.9* 9.5*  --   --  10.9*   HEMATOCRIT % 29.8* 27.2*  --   --  30.7*   PLATELETS Thousands/uL 33* 35*  --   --  46*   POTASSIUM mmol/L 3.6  --  3.7 3.7 3.9   CHLORIDE mmol/L 97  --  98 93* 87*   CO2 mmol/L 28  --  23 17* 18*   BUN mg/dL 8   "--  20 24 26*   CREATININE mg/dL 1.25  --  2.42* 3.57* 4.07*   CALCIUM mg/dL 7.7*  --  6.9* 6.9* 7.5*   MAGNESIUM mg/dL  --   --  1.6*  --   --    PHOSPHORUS mg/dL 2.1*  --   --   --   --    ALBUMIN g/dL 2.6*  --  2.4*  --  2.6*       Administrative Statements     Portions of the record may have been created with voice recognition software. Occasional wrong word or \"sound a like\" substitutions may have occurred due to the inherent limitations of voice recognition software. Read the chart carefully and recognize, using context, where substitutions have occurred.If you have any questions, please contact the dictating provider.  "

## 2024-09-23 NOTE — PROGRESS NOTES
Progress Note - Hospitalist   Name: Vladimir Delvalle 58 y.o. male I MRN: [id number]  Unit/Bed#: -01 I Date of Admission: 9/21/2024   Date of Service: 9/23/2024 I Hospital Day: 2    Assessment & Plan  MONAE (acute kidney injury) (HCC)  C/c : coming in with poor appetite, nausea/vomiting x 2-3 days, generalized weakness for 1 week  BL Creatinine 1, admission Cr 4.07  Suspect pre renal from significant volume depletion as above  continue withIV fluids  nephrology on board  Resolved, monitor  Essential hypertension  Hold lisinopril in setting of acute kidney injury  BP was actually running on the lower side, on ivf  Monitor closely  Elevated liver enzymes  + with elevated LFTs  Likely related to alcohol dependence  CT noted e/o hepatic steatosis  RUQ US : hepatic steatosis  Hepatitis panel pending  GI on board, given melanotic stools : plan for EGD today  No indication for prednisolone(discriminant function is less than 32)  Monitor closely  Anxiety  Continue Paxil  Alcohol dependence (HCC)  H/O alcohol dependence  States he has cut down but still 2-3 shots , 3 times a week  Last drink before coming into the hospital although he states he threw it all up  CIWA ordered.  Thiamine/ folic acid  Hyponatremia  Sodium noted to be 126 on admission  Likely from volume depletion versus poor solute intake  S/p IV fluids, improved 133 this am  Follow-up nephrology recommendations  Pancytopenia (HCC)  Likely related to alcohol related liver injury  Hold heparin products  Monitor Hb closely, 9.9 this am  Thrombocytopenia with platelet count 33 this am. Consent obtained. Transfuse 1 U platelets prior to EGD today  No bleeding concerns    VTE Pharmacologic Prophylaxis:   hold due to low platelets    Mobility:   Basic Mobility Inpatient Raw Score: 24  JH-HLM Goal: 8: Walk 250 feet or more  JH-HLM Achieved: 7: Walk 25 feet or more  JH-HLM Goal achieved. Continue to encourage appropriate mobility.    Patient Centered Rounds: I  performed bedside rounds with nursing staff today.   Discussions with Specialists or Other Care Team Provider: GI    Education and Discussions with Family / Patient: patient    Current Length of Stay: 2 day(s)  Current Patient Status: Inpatient   Certification Statement: The patient will continue to require additional inpatient hospital stay due to EGD, LFTs  Discharge Plan: undetermined    Code Status: Level 1 - Full Code    Subjective   No further melanotic stools  No nausea/ vomiting  Reports weakness improving  No s/o WD    Vitals:   Temp (24hrs), Av.4 °F (36.9 °C), Min:97.5 °F (36.4 °C), Max:99.4 °F (37.4 °C)    Temp:  [97.5 °F (36.4 °C)-99.4 °F (37.4 °C)] 98.3 °F (36.8 °C)  HR:  [70-95] 84  Resp:  [16-18] 16  BP: (105-131)/() 131/81  SpO2:  [94 %-99 %] 97 %  Body mass index is 27.69 kg/m².     Input and Output Summary (last 24 hours):     Intake/Output Summary (Last 24 hours) at 2024 1222  Last data filed at 2024 1220  Gross per 24 hour   Intake 0 ml   Output 150 ml   Net -150 ml       Physical Exam  Constitutional:       General: He is not in acute distress.     Appearance: He is normal weight. He is not toxic-appearing.   HENT:      Mouth/Throat:      Pharynx: Oropharynx is clear.   Eyes:      General: Scleral icterus present.   Pulmonary:      Effort: Pulmonary effort is normal.      Breath sounds: Normal breath sounds.   Abdominal:      General: Abdomen is flat. Bowel sounds are normal.      Palpations: Abdomen is soft.      Tenderness: There is no abdominal tenderness.   Musculoskeletal:      Right lower leg: No edema.      Left lower leg: No edema.   Neurological:      General: No focal deficit present.      Mental Status: He is alert and oriented to person, place, and time.          Lines/Drains:  Lines/Drains/Airways       Active Status       None                  Results from last 7 days   Lab Units 24  0450   WBC Thousand/uL 5.14   HEMOGLOBIN g/dL 9.9*   HEMATOCRIT % 29.8*    PLATELETS Thousands/uL 33*   SEGS PCT % 66   LYMPHO PCT % 27   MONO PCT % 5   EOS PCT % 1     Results from last 7 days   Lab Units 09/23/24  0450   SODIUM mmol/L 133*   POTASSIUM mmol/L 3.6   CHLORIDE mmol/L 97   CO2 mmol/L 28   BUN mg/dL 8   CREATININE mg/dL 1.25   ANION GAP mmol/L 8   CALCIUM mg/dL 7.7*   ALBUMIN g/dL 2.6*   TOTAL BILIRUBIN mg/dL 4.66*   ALK PHOS U/L 396*   ALT U/L 103*   AST U/L 258*   GLUCOSE RANDOM mg/dL 105     Results from last 7 days   Lab Units 09/22/24  0458   INR  0.90     Results from last 7 days   Lab Units 09/22/24  2354   POC GLUCOSE mg/dl 127         Results from last 7 days   Lab Units 09/22/24  0639 09/21/24  1942 09/21/24  1711   LACTIC ACID mmol/L 0.6 2.7* 3.1*       Recent Cultures (last 7 days):           Last 24 Hours Medication List:     Current Facility-Administered Medications:     ergocalciferol (VITAMIN D2) capsule 50,000 Units, Weekly    folic acid (FOLVITE) tablet 1 mg, Daily    multivitamin-minerals (CENTRUM) tablet 1 tablet, Daily    ondansetron (ZOFRAN) injection 4 mg, Q6H PRN    pantoprazole (PROTONIX) injection 40 mg, Q12H MIGUEL    PARoxetine (PAXIL) tablet 20 mg, Daily    phenol (CHLORASEPTIC) 1.4 % mucosal liquid 1 spray, Q2H PRN    sodium chloride 0.9 % infusion, Continuous    thiamine tablet 100 mg, Daily    Administrative Statements   Today, Patient Was Seen By: Janes Puga MD      **Please Note: This note may have been constructed using a voice recognition system.**

## 2024-09-23 NOTE — PLAN OF CARE
Problem: PAIN - ADULT  Goal: Verbalizes/displays adequate comfort level or baseline comfort level  Description: Interventions:  - Encourage patient to monitor pain and request assistance  - Assess pain using appropriate pain scale  - Administer analgesics based on type and severity of pain and evaluate response  - Implement non-pharmacological measures as appropriate and evaluate response  - Consider cultural and social influences on pain and pain management  - Notify physician/advanced practitioner if interventions unsuccessful or patient reports new pain  Outcome: Progressing     Problem: INFECTION - ADULT  Goal: Absence or prevention of progression during hospitalization  Description: INTERVENTIONS:  - Assess and monitor for signs and symptoms of infection  - Monitor lab/diagnostic results  - Monitor all insertion sites, i.e. indwelling lines, tubes, and drains  - Monitor endotracheal if appropriate and nasal secretions for changes in amount and color  - [place] appropriate cooling/warming therapies per order  - Administer medications as ordered  - Instruct and encourage patient and family to use good hand hygiene technique  - Identify and instruct in appropriate isolation precautions for identified infection/condition  Outcome: Progressing     Problem: Prexisting or High Potential for Compromised Skin Integrity  Goal: Skin integrity is maintained or improved  Description: INTERVENTIONS:  - Identify patients at risk for skin breakdown  - Assess and monitor skin integrity  - Assess and monitor nutrition and hydration status  - Monitor labs   - Assess for incontinence   - Turn and reposition patient  - Assist with mobility/ambulation  - Relieve pressure over bony prominences  - Avoid friction and shearing  - Provide appropriate hygiene as needed including keeping skin clean and dry  - Evaluate need for skin moisturizer/barrier cream  - Collaborate with interdisciplinary team   - Patient/family teaching  -  Consider wound care consult   Outcome: Progressing

## 2024-09-23 NOTE — ANESTHESIA POSTPROCEDURE EVALUATION
Post-Op Assessment Note    CV Status:  Stable    Pain management: adequate       Mental Status:  Alert and awake   Hydration Status:  Euvolemic   PONV Controlled:  Controlled   Airway Patency:  Patent  Two or more mitigation strategies used for obstructive sleep apnea   Post Op Vitals Reviewed: Yes    No anethesia notable event occurred.    Staff: Anesthesiologist, CRNA               BP   139/68   Temp 97.6   Pulse  89   Resp 18   SpO2 94

## 2024-09-23 NOTE — ASSESSMENT & PLAN NOTE
+ with elevated LFTs  Likely related to alcohol dependence  CT noted e/o hepatic steatosis  RUQ US : hepatic steatosis  Hepatitis panel pending  GI on board, given melanotic stools : plan for EGD today  No indication for prednisolone(discriminant function is less than 32)  Monitor closely

## 2024-09-23 NOTE — CASE MANAGEMENT
Case Management Progress Note    Patient name Vladimir Delvalle  Location /-01 MRN [id number]  : 1965 Date 2024       LOS (days): 2  Geometric Mean LOS (GMLOS) (days):   Days to GMLOS:        OBJECTIVE:        Current admission status: Inpatient  Preferred Pharmacy:   [facility] #[id number] - [place] - [address]  [address]  [facility] [address]  Phone: [phone] Fax: [phone]    [facility][id number]  [facility] [address]  [address] [address]  Phone: [phone] Fax: [phone]    [facility]/pharmacy #[id number] - [place] - [address]  [address] [address]  Phone: [phone] Fax: [phone]    Primary Care Provider: BEL Barrett    Primary Insurance:   Secondary Insurance:     PROGRESS NOTE:  CM attempted to meet with patient to discuss discharge planning, but patient is not in room- in GI lab for EGD. Will attempt to meet with at another time. Patient anticipated to discharge in 48-72h.

## 2024-09-23 NOTE — ASSESSMENT & PLAN NOTE
C/c : coming in with poor appetite, nausea/vomiting x 2-3 days, generalized weakness for 1 week  BL Creatinine 1, admission Cr 4.07  Suspect pre renal from significant volume depletion as above  continue withIV fluids  nephrology on board  Resolved, monitor

## 2024-09-23 NOTE — DISCHARGE INSTR - OTHER ORDERS
Skin care plans:  1-Left buttock and left anterior tibial- Cleanse wounds with NSS, pat dry. Apply Normlgel Ag over wound beds and cover with Silicone foam dressing or band-aide. Change daily and as needed for soilage/displacement.         Schedule Follow-up Outpatient Wound Appointment.  Call Outpatient Wound and Ostomy Care Team @ [phone]   Option 1: [place], [place], [place], [place]  Option 2: [facility] [place], [place]

## 2024-09-23 NOTE — PLAN OF CARE
Problem: INFECTION - ADULT  Goal: Absence or prevention of progression during hospitalization  Description: INTERVENTIONS:  - Assess and monitor for signs and symptoms of infection  - Monitor lab/diagnostic results  - Monitor all insertion sites, i.e. indwelling lines, tubes, and drains  - Monitor endotracheal if appropriate and nasal secretions for changes in amount and color  - [place] appropriate cooling/warming therapies per order  - Administer medications as ordered  - Instruct and encourage patient and family to use good hand hygiene technique  - Identify and instruct in appropriate isolation precautions for identified infection/condition  Outcome: Progressing

## 2024-09-23 NOTE — ASSESSMENT & PLAN NOTE
Sodium noted to be 126 on admission  Likely from volume depletion versus poor solute intake  S/p IV fluids, improved 133 this am  Follow-up nephrology recommendations

## 2024-09-23 NOTE — ASSESSMENT & PLAN NOTE
Improved with hydration.  Seems to be at baseline.  Will continue hydration for now and monitor    Will change IV fluid to normal saline in view of hyponatremia

## 2024-09-23 NOTE — ASSESSMENT & PLAN NOTE
Likely related to alcohol related liver injury  Hold heparin products  Monitor Hb closely, 9.9 this am  Thrombocytopenia with platelet count 33 this am. Consent obtained. Transfuse 1 U platelets prior to EGD today  No bleeding concerns

## 2024-09-23 NOTE — ANESTHESIA PREPROCEDURE EVALUATION
Procedure:  EGD    Smoker    States he drinks 1-2 drinks every other day  Imaging does not show evidence of liver cirrhosis but does have severe steatosis    Administering 1 pool of plts immediately prior to procedure. Pt denies hematemesis, and hematochezia. +melena    +cough - which pt states is new. Non productive. Xray on admission did not show evidence of a consolidation concerning for pna or pleural effusions  Breathing comfortably on RA otherwise. Denies fevers/chills     Relevant Problems   CARDIO   (+) Essential hypertension   (+) Mixed hyperlipidemia      /RENAL   (+) MONAE (acute kidney injury) (HCC)      HEMATOLOGY   (+) Anemia  Thrombocytopenia  Lab Results   Component Value Date    WBC 5.14 09/23/2024    HGB 9.9 (L) 09/23/2024    HCT 29.8 (L) 09/23/2024    MCV 96 09/23/2024    PLT 33 (L) 09/23/2024           NEURO/PSYCH   (+) Anxiety             Anesthesia Plan  ASA Score- 4     Anesthesia Type- IV sedation with anesthesia with ASA Monitors.         Additional Monitors:     Airway Plan:     Comment: Risks of bronchospasm and laryngospasm discussed with patient given smoking history. Discussed that there is a possibility that we may need to abort the procedure or convert to GA if these complications arise. All questions were answered.  .       Plan Factors-    Chart reviewed.   Existing labs reviewed. Patient summary reviewed.    Patient is a current smoker.              Induction- intravenous.    Postoperative Plan-         Informed Consent- Anesthetic plan and risks discussed with patient.  I personally reviewed this patient with the CRNA. Discussed and agreed on the Anesthesia Plan with the CRNA..

## 2024-09-23 NOTE — ASSESSMENT & PLAN NOTE
H/O alcohol dependence  States he has cut down but still 2-3 shots , 3 times a week  Last drink before coming into the hospital although he states he threw it all up  CIWA ordered.  Thiamine/ folic acid

## 2024-09-23 NOTE — WOUND OSTOMY CARE
Consult Note - Wound   Vladimir Delvalle 58 y.o. male MRN: [id number]  Unit/Bed#: -01 Encounter: [id number]        Assessment :   Patient admitted to [facility] due to MONAE. History of HTN. Wound care consulted for left buttock wound. Patient is agreeable to assessment, alert and oriented x4, continent of bowel and bladder, independently turns for assessment, is independent with care. Primary nurse made aware of assessment.     1. Left buttock abrasion- Wounds are linear in shape and scattered, partial thickness, approx. 20% yellow tissue and 80% pink tissue, no drainage noted. Maryann-wound is dry, intact, blanchable pink and red skin.    2. Mid sacrum noted with scattered areas of blanchable red intact skin, suspect scar tissue.     3. Left anterior tibial- Two wounds pictured and measured together. Wounds are oval in shape, partial thickness, 100% pink tissue, no drainage noted. Maryann-wounds are dry, intact, mild redness, no warmth.    4. Bilateral elbows, hips, and heels- skin is dry, intact, blanchable.     No induration, fluctuance, odor, warmth, or purulence noted to the above noted wounds. New dressings applied. Patient tolerated well, reports mild pain to the left buttock wound. Primary nurse aware of plan of care. See flow sheets for more detailed assessment findings. Will follow along.    Skin care plans:  1-Hydraguard to bilateral sacrum, buttock and heels BID and PRN  2-Elevate heels to offload pressure.  3-Ehob cushion in chair when out of bed.  4-Moisturize skin daily with skin nourishing cream.  5-Left buttock and left anterior tibial- Cleanse wounds with NSS, pat dry. Apply Normlgel Ag over wound beds and cover with Silicone foam dressing. Change every other day and as needed for soilage/displacement.       Wound 09/23/24 Buttocks Left (Active)   Wound Image   09/23/24 1122   Wound Description Yellow;Pink 09/23/24 1122   Maryann-wound Assessment Dry;Intact;[place] 09/23/24 1122   Wound Length (cm) 2 cm 09/23/24  1122   Wound Width (cm) 3 cm 09/23/24 1122   Wound Depth (cm) 0.1 cm 09/23/24 1122   Wound Surface Area (cm^2) 6 cm^2 09/23/24 1122   Wound Volume (cm^3) 0.6 cm^3 09/23/24 1122   Calculated Wound Volume (cm^3) 0.6 cm^3 09/23/24 1122   Drainage Amount None 09/23/24 1122   Non-staged Wound Description Partial thickness 09/23/24 1122   Treatments Cleansed;Site care;Irrigation with NSS 09/23/24 1122   Dressing Silvasorb gel;Foam, Silicon (eg. Allevyn, etc) 09/23/24 1122   Wound packed? No 09/23/24 1122   Dressing Changed New 09/23/24 1122   Patient Tolerance Tolerated well 09/23/24 1122   Dressing Status Clean;Dry;Intact 09/23/24 1122       Wound 09/23/24 Pretibial Left (Active)   Wound Image   09/23/24 1124   Wound Description Pink 09/23/24 1124   Maryann-wound Assessment Dry;Intact;Pink 09/23/24 1124   Wound Length (cm) 10 cm 09/23/24 1124   Wound Width (cm) 1 cm 09/23/24 1124   Wound Depth (cm) 0.1 cm 09/23/24 1124   Wound Surface Area (cm^2) 10 cm^2 09/23/24 1124   Wound Volume (cm^3) 1 cm^3 09/23/24 1124   Calculated Wound Volume (cm^3) 1 cm^3 09/23/24 1124   Drainage Amount None 09/23/24 1124   Non-staged Wound Description Partial thickness 09/23/24 1124   Treatments Cleansed;Irrigation with NSS;Site care 09/23/24 1124   Dressing Silvasorb gel;Foam, Silicon (eg. Allevyn, etc) 09/23/24 1124   Wound packed? No 09/23/24 1124   Dressing Changed Changed 09/23/24 1124   Patient Tolerance Tolerated well 09/23/24 1124   Dressing Status Clean;Dry;Intact 09/23/24 1124           Contact through Epic Secure Chat with any questions  Wound Care will continue to follow    Grace DIANEN RN CWON  Wound and Ostomy care

## 2024-09-24 ENCOUNTER — TRANSITIONAL CARE MANAGEMENT (OUTPATIENT)
Dept: FAMILY MEDICINE CLINIC | Facility: CLINIC | Age: 59
End: 2024-09-24

## 2024-09-24 VITALS
RESPIRATION RATE: 16 BRPM | TEMPERATURE: 97.8 F | OXYGEN SATURATION: 93 % | BODY MASS INDEX: 27.65 KG/M2 | WEIGHT: 204.15 LBS | DIASTOLIC BLOOD PRESSURE: 77 MMHG | HEART RATE: 92 BPM | HEIGHT: 72 IN | SYSTOLIC BLOOD PRESSURE: 115 MMHG

## 2024-09-24 LAB
A1AT SERPL-MCNC: 158 MG/DL (ref 101–187)
ABO GROUP BLD BPU: NORMAL
ACTIN IGG SERPL-ACNC: 5 UNITS (ref 0–19)
ALBUMIN SERPL BCG-MCNC: 2.5 G/DL (ref 3.5–5)
ALP SERPL-CCNC: 338 U/L (ref 34–104)
ALT SERPL W P-5'-P-CCNC: 79 U/L (ref 7–52)
ANION GAP SERPL CALCULATED.3IONS-SCNC: 8 MMOL/L (ref 4–13)
AST SERPL W P-5'-P-CCNC: 151 U/L (ref 13–39)
BASOPHILS # BLD MANUAL: 0.06 THOUSAND/UL (ref 0–0.1)
BASOPHILS NFR MAR MANUAL: 1 % (ref 0–1)
BILIRUB SERPL-MCNC: 3.57 MG/DL (ref 0.2–1)
BPU ID: NORMAL
BUN SERPL-MCNC: 5 MG/DL (ref 5–25)
CALCIUM ALBUM COR SERPL-MCNC: 8.6 MG/DL (ref 8.3–10.1)
CALCIUM SERPL-MCNC: 7.4 MG/DL (ref 8.4–10.2)
CERULOPLASMIN SERPL-MCNC: 16.7 MG/DL (ref 16–31)
CHLORIDE SERPL-SCNC: 99 MMOL/L (ref 96–108)
CO2 SERPL-SCNC: 27 MMOL/L (ref 21–32)
CREAT SERPL-MCNC: 0.88 MG/DL (ref 0.6–1.3)
EOSINOPHIL # BLD MANUAL: 0.18 THOUSAND/UL (ref 0–0.4)
EOSINOPHIL NFR BLD MANUAL: 3 % (ref 0–6)
ERYTHROCYTE [DISTWIDTH] IN BLOOD BY AUTOMATED COUNT: 15.9 % (ref 11.6–15.1)
GFR SERPL CREATININE-BSD FRML MDRD: 95 ML/MIN/1.73SQ M
GLUCOSE SERPL-MCNC: 86 MG/DL (ref 65–140)
HCT VFR BLD AUTO: 25.7 % (ref 36.5–49.3)
HGB BLD-MCNC: 8.8 G/DL (ref 12–17)
LYMPHOCYTES # BLD AUTO: 1.32 THOUSAND/UL (ref 0.6–4.47)
LYMPHOCYTES # BLD AUTO: 22 % (ref 14–44)
MCH RBC QN AUTO: 32.7 PG (ref 26.8–34.3)
MCHC RBC AUTO-ENTMCNC: 34.2 G/DL (ref 31.4–37.4)
MCV RBC AUTO: 96 FL (ref 82–98)
MITOCHONDRIA M2 IGG SER-ACNC: <20 UNITS (ref 0–20)
MONOCYTES # BLD AUTO: 0.48 THOUSAND/UL (ref 0–1.22)
MONOCYTES NFR BLD: 8 % (ref 4–12)
NEUTROPHILS # BLD MANUAL: 3.97 THOUSAND/UL (ref 1.85–7.62)
NEUTS SEG NFR BLD AUTO: 66 % (ref 43–75)
PLATELET # BLD AUTO: 72 THOUSANDS/UL (ref 149–390)
PLATELET BLD QL SMEAR: ABNORMAL
PMV BLD AUTO: 10.7 FL (ref 8.9–12.7)
POTASSIUM SERPL-SCNC: 3.1 MMOL/L (ref 3.5–5.3)
PROT SERPL-MCNC: 5.1 G/DL (ref 6.4–8.4)
RBC # BLD AUTO: 2.69 MILLION/UL (ref 3.88–5.62)
RBC MORPH BLD: PRESENT
SODIUM SERPL-SCNC: 134 MMOL/L (ref 135–147)
UNIT DISPENSE STATUS: NORMAL
UNIT PRODUCT CODE: NORMAL
UNIT PRODUCT VOLUME: 236 ML
UNIT RH: NORMAL
WBC # BLD AUTO: 6.02 THOUSAND/UL (ref 4.31–10.16)

## 2024-09-24 PROCEDURE — 94762 N-INVAS EAR/PLS OXIMTRY CONT: CPT

## 2024-09-24 PROCEDURE — 99239 HOSP IP/OBS DSCHRG MGMT >30: CPT | Performed by: INTERNAL MEDICINE

## 2024-09-24 PROCEDURE — 85027 COMPLETE CBC AUTOMATED: CPT | Performed by: INTERNAL MEDICINE

## 2024-09-24 PROCEDURE — 99232 SBSQ HOSP IP/OBS MODERATE 35: CPT | Performed by: INTERNAL MEDICINE

## 2024-09-24 PROCEDURE — 85007 BL SMEAR W/DIFF WBC COUNT: CPT | Performed by: INTERNAL MEDICINE

## 2024-09-24 PROCEDURE — 80053 COMPREHEN METABOLIC PANEL: CPT | Performed by: FAMILY MEDICINE

## 2024-09-24 RX ORDER — PANTOPRAZOLE SODIUM 40 MG/1
40 TABLET, DELAYED RELEASE ORAL 2 TIMES DAILY
Qty: 60 TABLET | Refills: 0 | Status: SHIPPED | OUTPATIENT
Start: 2024-09-24

## 2024-09-24 RX ORDER — MULTIVITAMIN
1 CAPSULE ORAL DAILY
Qty: 30 CAPSULE | Refills: 0 | Status: SHIPPED | OUTPATIENT
Start: 2024-09-24

## 2024-09-24 RX ORDER — FOLIC ACID 1 MG/1
1 TABLET ORAL DAILY
Qty: 30 TABLET | Refills: 0 | Status: SHIPPED | OUTPATIENT
Start: 2024-09-25

## 2024-09-24 RX ORDER — LANOLIN ALCOHOL/MO/W.PET/CERES
100 CREAM (GRAM) TOPICAL DAILY
Qty: 30 TABLET | Refills: 0 | Status: SHIPPED | OUTPATIENT
Start: 2024-09-25

## 2024-09-24 RX ORDER — POTASSIUM CHLORIDE 1500 MG/1
40 TABLET, EXTENDED RELEASE ORAL EVERY 4 HOURS
Status: COMPLETED | OUTPATIENT
Start: 2024-09-24 | End: 2024-09-24

## 2024-09-24 RX ADMIN — POTASSIUM CHLORIDE 40 MEQ: 1500 TABLET, EXTENDED RELEASE ORAL at 14:10

## 2024-09-24 RX ADMIN — Medication 1 TABLET: at 08:26

## 2024-09-24 RX ADMIN — THIAMINE HCL TAB 100 MG 100 MG: 100 TAB at 08:26

## 2024-09-24 RX ADMIN — PANTOPRAZOLE SODIUM 40 MG: 40 INJECTION, POWDER, FOR SOLUTION INTRAVENOUS at 08:28

## 2024-09-24 RX ADMIN — POTASSIUM CHLORIDE 40 MEQ: 1500 TABLET, EXTENDED RELEASE ORAL at 11:35

## 2024-09-24 RX ADMIN — PAROXETINE 20 MG: 20 TABLET, FILM COATED ORAL at 08:26

## 2024-09-24 RX ADMIN — FOLIC ACID 1 MG: 1 TABLET ORAL at 08:26

## 2024-09-24 RX ADMIN — SODIUM CHLORIDE 100 ML/HR: 0.9 INJECTION, SOLUTION INTRAVENOUS at 02:46

## 2024-09-24 NOTE — ASSESSMENT & PLAN NOTE
Improved with hydration.  Likely etiology is volume depletion.  His kidney function seems to be normal.  Will stop IV fluid and monitor

## 2024-09-24 NOTE — PROGRESS NOTES
Progress Note - Nephrology   Name: Vladimir Delvalle 58 y.o. male I MRN: [id number]  Unit/Bed#: -01 I Date of Admission: 9/21/2024   Date of Service: 9/24/2024 I Hospital Day: 3     Assessment & Plan  MONAE (acute kidney injury) (HCC)  Improved with hydration.  Likely etiology is volume depletion.  His kidney function seems to be normal.  Will stop IV fluid and monitor     Hyponatremia  Improving though still low.  Will continue to monitor  Essential hypertension  Blood pressure is improving.  Will continue to monitor  Elevated liver enzymes  Being monitored and worked up by GI    I have reviewed the nephrology recommendations including management of acute kidney injury, with Slim, and we are in agreement with renal plan including the information outlined above.     History of Present Illness   Brief History of Admission -acute kidney injury and volume depletion    Overall feeling well    No acute complaint    No chest pain no palpitation    No nausea no vomiting    Objective      Temp:  [97.8 °F (36.6 °C)-99.5 °F (37.5 °C)] 97.8 °F (36.6 °C)  HR:  [73-96] 92  Resp:  [16-20] 16  BP: (102-155)/(60-95) 115/77  O2 Device: None (Room air)         Vitals:    09/21/24 1338 09/21/24 2148   Weight: 92.6 kg (204 lb 2.3 oz) 92.6 kg (204 lb 2.3 oz)     I/O last 24 hours:  In: 2832.7 [P.O.:1180; I.V.:1416.7; Blood:236]  Out: 550 [Urine:550]  Lines/Drains/Airways       Active Status       None                  Physical Exam  Constitutional:       General: He is not in acute distress.     Appearance: He is well-developed.   HENT:      Head: Normocephalic.      Mouth/Throat:      Mouth: Mucous membranes are moist.   Eyes:      General: No scleral icterus.     Conjunctiva/sclera: Conjunctivae normal.   Neck:      Vascular: No JVD.   Cardiovascular:      Rate and Rhythm: Normal rate.      Heart sounds: Normal heart sounds.   Pulmonary:      Effort: Pulmonary effort is normal.      Breath sounds: No wheezing.   Abdominal:       Palpations: Abdomen is soft.      Tenderness: There is no abdominal tenderness.   Musculoskeletal:         General: Normal range of motion.      Cervical back: Neck supple.   Skin:     General: Skin is warm.      Findings: No rash.   Neurological:      Mental Status: He is alert and oriented to person, place, and time.   Psychiatric:         Behavior: Behavior normal.        Medications:    Current Facility-Administered Medications:     ergocalciferol (VITAMIN D2) capsule 50,000 Units, 50,000 Units, Oral, Weekly, Nickolas Anderson MD, 50,000 Units at 09/23/24 1039    folic acid (FOLVITE) tablet 1 mg, 1 mg, Oral, Daily, Janes Puga MD, 1 mg at 09/24/24 0826    multivitamin-minerals (CENTRUM) tablet 1 tablet, 1 tablet, Oral, Daily, Janes Puga MD, 1 tablet at 09/24/24 0826    ondansetron (ZOFRAN) injection 4 mg, 4 mg, Intravenous, Q6H PRN, Francisco Levi MD, 4 mg at 09/23/24 1827    pantoprazole (PROTONIX) injection 40 mg, 40 mg, Intravenous, Q12H MIGUEL, Francisco Levi MD, 40 mg at 09/24/24 0828    PARoxetine (PAXIL) tablet 20 mg, 20 mg, Oral, Daily, Janes Puga MD, 20 mg at 09/24/24 0826    phenol (CHLORASEPTIC) 1.4 % mucosal liquid 1 spray, 1 spray, Mouth/Throat, Q2H PRN, BEL Agustin, 1 spray at 09/22/24 2144    potassium chloride (Klor-Con M20) CR tablet 40 mEq, 40 mEq, Oral, Q4H, Kamila Blanchard MD    thiamine tablet 100 mg, 100 mg, Oral, Daily, Janes Puga MD, 100 mg at 09/24/24 0826      Lab Results: I have reviewed the following results:   Results from last 7 days   Lab Units 09/24/24  0438 09/23/24  1719 09/23/24  0450 09/22/24  0639 09/22/24  0458 09/21/24  1630 09/21/24  1401   WBC Thousand/uL 6.02 6.56 5.14 4.07*  --   --  5.66   HEMOGLOBIN g/dL 8.8* 9.9* 9.9* 9.5*  --   --  10.9*   HEMATOCRIT % 25.7* 29.4* 29.8* 27.2*  --   --  30.7*   PLATELETS Thousands/uL 72* 85* 33* 35*  --   --  46*   POTASSIUM mmol/L 3.1*  --  3.6  --  3.7 3.7 3.9   CHLORIDE mmol/L 99  --  97  --  98 93* 87*   CO2 mmol/L 27   "--  28  --  23 17* 18*   BUN mg/dL 5  --  8  --  20 24 26*   CREATININE mg/dL 0.88  --  1.25  --  2.42* 3.57* 4.07*   CALCIUM mg/dL 7.4*  --  7.7*  --  6.9* 6.9* 7.5*   MAGNESIUM mg/dL  --   --   --   --  1.6*  --   --    PHOSPHORUS mg/dL  --   --  2.1*  --   --   --   --    ALBUMIN g/dL 2.5*  --  2.6*  --  2.4*  --  2.6*       Administrative Statements     Portions of the record may have been created with voice recognition software. Occasional wrong word or \"sound a like\" substitutions may have occurred due to the inherent limitations of voice recognition software. Read the chart carefully and recognize, using context, where substitutions have occurred.If you have any questions, please contact the dictating provider.  "

## 2024-09-24 NOTE — DISCHARGE SUMMARY
Discharge Summary - [facility]  Patient: Vladimir Delvalle 58 y.o. male   MRN: [id number]  PCP: BEL Barrett  Unit/Bed#: -01 Encounter: [id number]            Discharging Physician / Practitioner: Kamila Blanchard MD  PCP: BEL Barrett  Admission Date:   Admission Orders (From admission, onward)       Ordered        09/21/24 1708  Inpatient Admission  Once                          Discharge Date: 09/24/24      Reason for Admission:  Decreased appetite      Discharge Diagnoses:     Principal Problem:    MONAE (acute kidney injury) (HCC)    Active Problems:    Melena    Essential hypertension    Elevated liver enzymes    Anxiety    Alcohol dependence (HCC)    Hyponatremia    Hypokalemia    Pancytopenia (HCC)      Consultations During Hospital Stay:  Gastroenterology      Hospital Course:     Vladimir Delvalle is a 58 y.o. male patient who originally presented to the hospital on 9/21/2024 due to complaints of decreased appetite with some generalized weakness.  Initial blood work revealed evidence of significant acute kidney injury with a creatinine of 4.07, which progressively normalized with IV fluid hydration, currently at 0.88 on day of discharge.  Through his hospital course, he also developed worsening anemia in the setting of alcoholic liver disease induced pancytopenia.  Additionally, he did complain of occasional melanotic stools.  He required a unit of previous transfusion, with a gastroenterology consultation, ultimately leading to an EGD which revealed evidence of esophagitis and clean-based gastric ulcerations.  He will be continued on a twice daily PPI regimen, with continued instructions for complete alcohol cessation.  He will continue with thiamine/folic acid and multivitamin supplement course.  His presenting hyponatremia, likely secondary to decreased oral slight intake coupled with alcoholic consumption induced SIADH, also improved through hospital course.  He will  continue Paxil for anxiety.  Elevated LFTs improved through hospital course.  He will be discharged home today with outpatient gastroenterology follow-up, along with his PCP.      Condition at Discharge: fair       Discharge Day Visit / Exam:     Vitals:  Blood Pressure: 115/77 (09/24/24 0800)  Pulse: 92 (09/24/24 0800)  Temperature: 97.8 °F (36.6 °C) (09/24/24 0729)  Temp Source: Oral (09/23/24 1606)  Respirations: 16 (09/23/24 1823)  Height: 6' (182.9 cm) (09/21/24 2148)  Weight - Scale: 92.6 kg (204 lb 2.3 oz) (09/21/24 2148)  SpO2: 93 % (09/24/24 0742)      Physical exam:  I had a face-to-face encounter with the patient on day of discharge.      Discussion with Patient and/or Family:  The patient has been advised to return to the ER immediately if any symptoms recur or worsen.       Discharge instructions/Information to Patient and/or Family:   See after visit summary for information provided to patient and/or family.        Provisions for Follow-Up Care:  See after visit summary for information related to follow-up care and any pertinent home health orders.        Disposition:   Home      Discharge Medications:  See after visit summary for reconciled discharge medications provided to patient and/or family.        Discharge Statement:  I spent 38 minutes discharging the patient. This time was spent on the day of discharge. I had direct contact with the patient on the day of discharge. Greater than 50% of the total time was spent examining patient, answering all patient questions, arranging and discussing plan of care with patient as well as directly providing post-discharge instructions.  Additional time then spent on discharge activities.           SHERRILL HESTER MD   Hospitalist - [facility] Medicine        ** Please Note: This note is constructed using a voice recognition dictation system.  An occasional wrong word/phrase or “sound-a-like” substitution may have been picked up by dictation device due  to the inherent limitations of voice recognition software.  Read the chart carefully and recognize, using reasonable context, where substitutions may have occurred.**

## 2024-09-24 NOTE — PLAN OF CARE
Problem: SAFETY ADULT  Goal: Patient will remain free of falls  Description: INTERVENTIONS:  - Educate patient/family on patient safety including physical limitations  - Instruct patient to call for assistance with activity   - Consult OT/PT to assist with strengthening/mobility   - Keep Call bell within reach  - Keep bed low and locked with side rails adjusted as appropriate  - Keep care items and personal belongings within reach  - Initiate and maintain comfort rounds  - Make Fall Risk Sign visible to staff  - Offer Toileting every 4 Hours, in advance of need  - Initiate/Maintain bed alarm  - Obtain necessary fall risk management equipment:   - Apply yellow socks and bracelet for high fall risk patients  - Consider moving patient to room near nurses station  Outcome: Progressing  Goal: Maintain or return to baseline ADL function  Description: INTERVENTIONS:  -  Assess patient's ability to carry out ADLs; assess patient's baseline for ADL function and identify physical deficits which impact ability to perform ADLs (bathing, care of mouth/teeth, toileting, grooming, dressing, etc.)  - Assess/evaluate cause of self-care deficits   - Assess range of motion  - Assess patient's mobility; develop plan if impaired  - Assess patient's need for assistive devices and provide as appropriate  - Encourage maximum independence but intervene and supervise when necessary  - Involve family in performance of ADLs  - Assess for home care needs following discharge   - Consider OT consult to assist with ADL evaluation and planning for discharge  - Provide patient education as appropriate  Outcome: Progressing  Goal: Maintains/Returns to pre admission functional level  Description: INTERVENTIONS:  - Perform AM-PAC 6 Click Basic Mobility/ Daily Activity assessment daily.  - Set and communicate daily mobility goal to care team and patient/family/caregiver.   - Collaborate with rehabilitation services on mobility goals if consulted  -  Out of bed for toileting  - Record patient progress and toleration of activity level   Outcome: Progressing

## 2024-09-24 NOTE — PROGRESS NOTES
Progress Note - Vladimir Delvalle 58 y.o. male MRN: [id number]    Unit/Bed#: -01 Encounter: [id number]        Assessment and Plan:     1) Melena and anemia secondary to PUD and esophagitis- Patient presented with a hemoglobin of 10.9 on admission.  Hemoglobin down trended to 8.8.  EGD performed yesterday revealing grade C esophagitis, and 3 clean-based ulcers in the stomach.  - Recommend complete cessation of alcohol to prevent increased mortality risk and onset of GI bleeding again in the future  - No NSAIDs and herbal supplements also recommended  - Outpatient follow-up with PCP following discharge  - Will need to be discharged on PPI twice daily for 3 months followed by a follow-up EGD as an outpatient  - We will also arrange for outpatient follow-up  - Patient to be discharged today, GI will sign off    2) Elevated LFTs, hepatic steatosis secondary to alcoholic liver disease - Patient with likely underlying alcoholic liver disease complicated by alcoholic hepatitis.  Discriminant function is under 32 therefore did not require Trental or prednisolone.  Right upper quadrant ultrasound revealing hepatic steatosis with trace ascites.  LFTs trending downward since admission, today as follows: , ALT 79, alkaline phosphatase 338 and total bilirubin 3.57.  AST to ALT ratio consistent with alcohol use.  - Follow-up full liver workup that was drawn as an outpatient  - Will need blood work performed in 1 week  - Multivitamin, thiamine and folate  - Again, complete cessation of alcohol      Subjective:     Patient reports that he was able to eat well this morning and at lunchtime today.  He has no new complaints.  Patient reports he has a good relationship with his PCP, will be following up with her post hospital stay.  In addition, is willing to see us as an outpatient, reiterated recommendation of alcohol cessation and repeat endoscopy.    ROS: As noted in the HPI, otherwise all others negative.    Objective:      Vitals: Blood pressure 115/77, pulse 92, temperature 97.8 °F (36.6 °C), resp. rate 16, height 6' (1.829 m), weight 92.6 kg (204 lb 2.3 oz), SpO2 93%.,Body mass index is 27.69 kg/m².      Intake/Output Summary (Last 24 hours) at 9/24/2024 1348  Last data filed at 9/24/2024 1214  Gross per 24 hour   Intake 3072.67 ml   Output 400 ml   Net 2672.67 ml       Physical Exam:     General Appearance: Alert and oriented x 3. In no respiratory distress  Lungs: Clear to auscultation bilaterally, no rales or rhonchi  Heart: Regular rate and rhythm, S1, S2 normal, no murmur, click, rub or gallop  Abdomen: Soft, non-tender, non-distended; bowel sounds normal; no masses or no organomegaly  Extremities: No cyanosis, edema    Invasive Devices       Peripheral Intravenous Line  Duration             Peripheral IV 09/21/24 Left;Ventral (anterior) Forearm 2 days    Peripheral IV 09/23/24 Distal;Right;Ventral (anterior) Forearm <1 day                    Lab Results:  Results from last 7 days   Lab Units 09/24/24  0438 09/23/24  1719 09/23/24  0450   WBC Thousand/uL 6.02   < > 5.14   HEMOGLOBIN g/dL 8.8*   < > 9.9*   HEMATOCRIT % 25.7*   < > 29.8*   PLATELETS Thousands/uL 72*   < > 33*   SEGS PCT %  --   --  66   LYMPHO PCT % 22  --  27   MONO PCT % 8  --  5   EOS PCT % 3  --  1    < > = values in this interval not displayed.     Results from last 7 days   Lab Units 09/24/24  0438   POTASSIUM mmol/L 3.1*   CHLORIDE mmol/L 99   CO2 mmol/L 27   BUN mg/dL 5   CREATININE mg/dL 0.88   CALCIUM mg/dL 7.4*   ALK PHOS U/L 338*   ALT U/L 79*   AST U/L 151*     Results from last 7 days   Lab Units 09/22/24  0458   INR  0.90     Results from last 7 days   Lab Units 09/21/24  1401   LIPASE u/L 70       Imaging Studies: I have personally reviewed pertinent imaging studies.    EGD    Result Date: 9/23/2024  Impression: Grade C esophagitis in the lower third of the esophagus 2 cm type I hiatal hernia Moderate edematous, erythematous mucosa in the  "stomach 3 small, ulcers in the stomach with clean base (Bib III) Performed forceps biopsies in the body of the stomach, incisura and antrum to rule out H. pylori The 1st part of the duodenum and 2nd part of the duodenum appeared normal. RECOMMENDATION: Await pathology results Oral PPI 40 mg twice daily for 12 weeks.  Repeat EGD in 12 weeks to assess for healing of esophagitis and gastric ulcers.  As stated above, no obvious signs of portal hypertension like portal hypertensive gastropathy or varices.  Can consider discharge today or tomorrow from a GI standpoint.  Will need outpatient follow-up for alcohol liver disease.    Santos Levi MD     US right upper quadrant    Result Date: 9/22/2024  Impression: Hepatic steatosis and trace perihepatic ascites. Workstation performed: [id number]     CT abdomen pelvis wo contrast    Result Date: 9/21/2024  Impression: No acute findings. Workstation performed: [id number]     CT head without contrast    Result Date: 9/21/2024  Impression: No acute intracranial abnormality. Workstation performed: [id number]     XR chest 1 view portable    Result Date: 9/21/2024  Impression: No acute cardiopulmonary disease. Workstation performed: [id number]           Portions of the record may have been created with voice recognition software.  Occasional wrong word or \"sound a like\" substitutions may have occurred due to the inherent limitations of voice recognition software.  Read the chart carefully and recognize, using context, where substitutions have occurred.    "

## 2024-09-24 NOTE — CASE MANAGEMENT
Case Management Assessment & Discharge Planning Note    Patient name Vladimir Delvalle  Location /-01 MRN [id number]  : 1965 Date 2024       Current Admission Date: 2024  Current Admission Diagnosis:MONAE (acute kidney injury) (HCC)   Patient Active Problem List    Diagnosis Date Noted Date Diagnosed    Acidosis, metabolic 2024     Pancytopenia (HCC) 2024     Hyponatremia 2024     Alcohol dependence (HCC) 2024     Transaminitis 2024     MONAE (acute kidney injury) (HCC) 2024     Generalized weakness 2024     Anxiety 2023     Mixed hyperlipidemia 2021     Elevated liver enzymes 10/27/2020     Carpal tunnel syndrome of right wrist 2020     Essential hypertension 2020       LOS (days): 3  Geometric Mean LOS (GMLOS) (days):   Days to GMLOS:     OBJECTIVE:    Risk of Unplanned Readmission Score: 13.43         Current admission status: Inpatient       Preferred Pharmacy:   [facility]/pharmacy #[id number] - [place] - [address]  [address]  [facility] [address]  Phone: [phone] Fax: [phone]    [id number] [place] [facility] [address] [address]  Phone: [phone] Fax: [phone]    [facility]/pharmacy #[id number]  [place] - [address] [address]  Phone: [phone] Fax: [phone]    Primary Care Provider: BEL Barrett    Primary Insurance: [employer]LEY PENDING  Secondary Insurance:     ASSESSMENT:  Active Health Care Proxies    There are no active Health Care Proxies on file.       Advance Directives  Does patient have a Health Care POA?: No  Was patient offered paperwork?: Yes  Does patient currently have a Health Care decision maker?: No  Does patient have Advance Directives?: No  Was patient offered paperwork?: Yes  Primary Contact: marissa Lopez; [place], sister; Piyush, son         Readmission Root  Cause  30 Day Readmission: No    Patient Information  Admitted from:: Home  Mental Status: Alert  During Assessment patient was accompanied by: Not accompanied during assessment  Assessment information provided by:: Patient  Primary Caregiver: Self  Support Systems: Self, Son, Daughter, Family members  County [place] Residence: [place]  What city do you live in?: [place]  Home entry access options. Select all that apply.: Stairs  Number of steps to enter home.: One Flight ([address])  Do the steps have railings?: Yes  Type of Current Residence: Apartment  Floor Level: 2  Upon entering residence, is there a bedroom on the main floor (no further steps)?: Yes  Upon entering residence, is there a bathroom on the main floor (no further steps)?: Yes  Living Arrangements: Lives Alone  Is patient a ?: No    Activities of Daily Living Prior to Admission  Functional Status: Independent  Completes ADLs independently?: Yes  Ambulates independently?: Yes  Does patient use assisted devices?: No  Does patient currently own DME?: No  Does patient have a history of Outpatient Therapy (PT/OT)?: Yes  Does the patient have a history of Short-Term Rehab?: No  Does patient have a history of HHC?: No  Does patient currently have HHC?: No         Patient Information Continued  Income Source: Self-employed  Does patient have prescription coverage?: Yes  Does patient receive dialysis treatments?: No  Does patient have a history of substance abuse?: No, Currently using  Current substance use preference: Alcohol/ETOH  Historical substance use preference: Alcohol/ETOH  History of Withdrawal Symptoms: Denies past symptoms  Is patient currently in treatment for substance abuse?: No. Patient declined treatment information., N/A - sober  Does patient have a history of Mental Health Diagnosis?: Yes (anxiety)  Is patient receiving treatment for mental health?: Yes  Has patient received inpatient treatment related to mental health in the last 2 years?:  No    PHQ 2/9 Screening   Reviewed PHQ 2/9 Depression Screening Score?: No    Means of Transportation  Means of Transport to Appts:: Drives Self      Social Determinants of Health (SDOH)      Flowsheet Row Most Recent Value   Housing Stability    In the last 12 months, was there a time when you were not able to pay the mortgage or rent on time? N   In the past 12 months, how many times have you moved where you were living? 0   At any time in the past 12 months, were you homeless or living in a shelter (including now)? N   Transportation Needs    In the past 12 months, has lack of transportation kept you from medical appointments or from getting medications? no   In the past 12 months, has lack of transportation kept you from meetings, work, or from getting things needed for daily living? No   Food Insecurity    Within the past 12 months, you worried that your food would run out before you got the money to buy more. Never true   Within the past 12 months, the food you bought just didn't last and you didn't have money to get more. Never true   Utilities    In the past 12 months has the electric, gas, oil, or water company threatened to shut off services in your home? No            DISCHARGE DETAILS:    Discharge planning discussed with:: Patient at the bedside  Freedom of Choice: Yes  Comments - Freedom of Choice: FOC maintained in discussion regarding discharge planning. Patient is alert oriented and competent to make decisions. Introduced self and role, explained role of CM in arranging services such as DME, STR, HHC, and providing community resource information. Discussed current living situation and needs at discharge. Patient is IPTA. CM offered HHC, STR, DME, PCP, OP CM, community resources . Patient declined CM resources. Does not use DME. Pt is aware and encouraged to seek CM for any questions or concerns. CM continues to follow.  CM contacted family/caregiver?: No- see comments  Were Treatment Team discharge  recommendations reviewed with patient/caregiver?: Yes  Did patient/caregiver verbalize understanding of patient care needs?: Yes  Were patient/caregiver advised of the risks associated with not following Treatment Team discharge recommendations?: Yes    Contacts  Patient Contacts: uli Lopez  Relationship to Patient:: Family  Reason/Outcome: Emergency Contact    Requested Home Health Care         Is the patient interested in HHC at discharge?: No    DME Referral Provided  Referral made for DME?: No    Other Referral/Resources/Interventions Provided:  Interventions: Declines resources, None Indicated  Referral Comments: CM will continue to follow for needs.    Would you like to participate in our [facility] service program?  : No - Declined    Treatment Team Recommendation: Home  Discharge Destination Plan:: Home  Transport at Discharge : Family

## 2024-09-25 PROCEDURE — 88305 TISSUE EXAM BY PATHOLOGIST: CPT | Performed by: PATHOLOGY

## 2024-09-26 ENCOUNTER — TELEPHONE (OUTPATIENT)
Age: 59
End: 2024-09-26

## 2024-09-26 ENCOUNTER — TELEPHONE (OUTPATIENT)
Dept: GASTROENTEROLOGY | Facility: CLINIC | Age: 59
End: 2024-09-26

## 2024-10-07 LAB
HFE GENE MUT ANL BLD/T: NORMAL
Lab: NORMAL